# Patient Record
Sex: MALE | Race: WHITE | NOT HISPANIC OR LATINO | Employment: OTHER | URBAN - NONMETROPOLITAN AREA
[De-identification: names, ages, dates, MRNs, and addresses within clinical notes are randomized per-mention and may not be internally consistent; named-entity substitution may affect disease eponyms.]

---

## 2017-11-30 ENCOUNTER — PROCEDURE VISIT (OUTPATIENT)
Dept: OTOLARYNGOLOGY | Facility: CLINIC | Age: 77
End: 2017-11-30

## 2017-11-30 ENCOUNTER — OFFICE VISIT (OUTPATIENT)
Dept: OTOLARYNGOLOGY | Facility: CLINIC | Age: 77
End: 2017-11-30

## 2017-11-30 VITALS
BODY MASS INDEX: 38.3 KG/M2 | SYSTOLIC BLOOD PRESSURE: 142 MMHG | WEIGHT: 289 LBS | DIASTOLIC BLOOD PRESSURE: 80 MMHG | HEIGHT: 73 IN | RESPIRATION RATE: 20 BRPM | HEART RATE: 69 BPM | TEMPERATURE: 98.2 F

## 2017-11-30 DIAGNOSIS — H69.81 DYSFUNCTION OF RIGHT EUSTACHIAN TUBE: ICD-10-CM

## 2017-11-30 DIAGNOSIS — H69.81 DYSFUNCTION OF RIGHT EUSTACHIAN TUBE: Primary | ICD-10-CM

## 2017-11-30 DIAGNOSIS — H90.3 SENSORINEURAL HEARING LOSS (SNHL) OF BOTH EARS: ICD-10-CM

## 2017-11-30 DIAGNOSIS — J34.3 HYPERTROPHY OF BOTH INFERIOR NASAL TURBINATES: ICD-10-CM

## 2017-11-30 DIAGNOSIS — J30.9 ALLERGIC RHINITIS, UNSPECIFIED CHRONICITY, UNSPECIFIED SEASONALITY, UNSPECIFIED TRIGGER: ICD-10-CM

## 2017-11-30 DIAGNOSIS — H90.3 SENSORINEURAL HEARING LOSS (SNHL) OF BOTH EARS: Primary | ICD-10-CM

## 2017-11-30 PROCEDURE — 99203 OFFICE O/P NEW LOW 30 MIN: CPT | Performed by: NURSE PRACTITIONER

## 2017-11-30 RX ORDER — PENTOXIFYLLINE 400 MG/1
400 TABLET, EXTENDED RELEASE ORAL 2 TIMES DAILY
COMMUNITY
Start: 2017-10-30

## 2017-11-30 RX ORDER — DOXAZOSIN 8 MG/1
TABLET ORAL
Status: ON HOLD | COMMUNITY
Start: 2017-10-25 | End: 2021-01-01

## 2017-11-30 RX ORDER — LOSARTAN POTASSIUM 100 MG/1
TABLET ORAL
Status: ON HOLD | COMMUNITY
Start: 2017-10-30 | End: 2021-01-01

## 2017-11-30 RX ORDER — GLIMEPIRIDE 4 MG/1
TABLET ORAL
Status: ON HOLD | COMMUNITY
Start: 2017-10-25 | End: 2021-01-01

## 2017-11-30 RX ORDER — METFORMIN HYDROCHLORIDE 500 MG/1
500 TABLET, EXTENDED RELEASE ORAL 3 TIMES DAILY
Status: ON HOLD | COMMUNITY
Start: 2017-10-30 | End: 2021-01-01 | Stop reason: SDUPTHER

## 2017-11-30 RX ORDER — FUROSEMIDE 40 MG/1
TABLET ORAL
Status: ON HOLD | COMMUNITY
Start: 2017-10-30 | End: 2021-01-01

## 2017-11-30 RX ORDER — BISOPROLOL FUMARATE 10 MG/1
TABLET, FILM COATED ORAL
Status: ON HOLD | COMMUNITY
Start: 2017-10-30 | End: 2021-01-01

## 2017-11-30 RX ORDER — DOXEPIN HYDROCHLORIDE 10 MG/1
CAPSULE ORAL
Status: ON HOLD | COMMUNITY
Start: 2017-10-30 | End: 2021-01-01

## 2017-11-30 RX ORDER — FLUTICASONE PROPIONATE 50 MCG
2 SPRAY, SUSPENSION (ML) NASAL DAILY
Qty: 1 BOTTLE | Refills: 6 | Status: SHIPPED | OUTPATIENT
Start: 2017-11-30 | End: 2017-12-30

## 2017-11-30 RX ORDER — HYDRALAZINE HYDROCHLORIDE 25 MG/1
TABLET, FILM COATED ORAL
Status: ON HOLD | COMMUNITY
Start: 2017-10-30 | End: 2021-01-01

## 2017-11-30 RX ORDER — ALLOPURINOL 300 MG/1
TABLET ORAL
Status: ON HOLD | COMMUNITY
Start: 2017-10-25 | End: 2021-01-01

## 2017-11-30 RX ORDER — AMLODIPINE BESYLATE 10 MG/1
TABLET ORAL
Status: ON HOLD | COMMUNITY
Start: 2017-10-25 | End: 2021-01-01

## 2017-11-30 NOTE — PROGRESS NOTES
"PRIMARY CARE PROVIDER: Johnson Phan MD  REFERRING PROVIDER: Johnson Phan MD    Chief Complaint   Patient presents with   • Ear Problem     right ear pressure       Subjective   History of Present Illness:  Dayday Andrews is a  77 y.o. male who complains of ear pressure. The symptoms are localized to the right ear. The patient has had a resolution of the symptoms. The symptoms have been resolved for the last several weeks. The symptoms are aggravated by  \"a bad cold.\". There have been no factors that have improved the symptoms. He denies otalgia, otorrhea, dizziness, vertigo, tinnitus, or change in hearing. He is a nonsmoker    Review of Systems:  Review of Systems   Constitutional: Negative for chills and fever.   HENT: Positive for congestion. Negative for ear discharge, ear pain, hearing loss, postnasal drip, rhinorrhea, sore throat, tinnitus and voice change.    All other systems reviewed and are negative.      Past History:  Past Medical History:   Diagnosis Date   • Diabetes    • High blood pressure    • Sleep apnea      Past Surgical History:   Procedure Laterality Date   • KNEE SURGERY       Family History   Problem Relation Age of Onset   • Diabetes Mother      Social History   Substance Use Topics   • Smoking status: Never Smoker   • Smokeless tobacco: Never Used   • Alcohol use No     Allergies:  Review of patient's allergies indicates no known allergies.    Current Outpatient Prescriptions:   •  allopurinol (ZYLOPRIM) 300 MG tablet, , Disp: , Rfl:   •  amLODIPine (NORVASC) 10 MG tablet, , Disp: , Rfl:   •  bisoprolol (ZEBeta) 10 MG tablet, , Disp: , Rfl:   •  doxazosin (CARDURA) 8 MG tablet, , Disp: , Rfl:   •  doxepin (SINEquan) 10 MG capsule, , Disp: , Rfl:   •  furosemide (LASIX) 40 MG tablet, , Disp: , Rfl:   •  glimepiride (AMARYL) 4 MG tablet, , Disp: , Rfl:   •  hydrALAZINE (APRESOLINE) 25 MG tablet, , Disp: , Rfl:   •  losartan (COZAAR) 100 MG tablet, , Disp: , Rfl:   •  metFORMIN ER " (GLUCOPHAGE-XR) 500 MG 24 hr tablet, , Disp: , Rfl:   •  pentoxifylline (TRENtal) 400 MG CR tablet, , Disp: , Rfl:   •  fluticasone (FLONASE) 50 MCG/ACT nasal spray, 2 sprays into each nostril Daily for 30 days. Administer 2 sprays in each nostril for each dose., Disp: 1 bottle, Rfl: 6      Objective     Vital Signs:  Temp:  [98.2 °F (36.8 °C)] 98.2 °F (36.8 °C)  Heart Rate:  [69] 69  Resp:  [20] 20  BP: (142)/(80) 142/80    Physical Exam:  Physical Exam  CONSTITUTIONAL: well nourished, well-developed, alert, oriented, in no acute distress   COMMUNICATION AND VOICE: able to communicate normally, normal voice quality  HEAD: normocephalic, no lesions, atraumatic, no tenderness, no masses   FACE: appearance normal, no lesions, no tenderness, no deformities, facial motion symmetric  SALIVARY GLANDS: parotid glands with no tenderness, no swelling, no masses, submandibular glands with normal size, nontender  EYES: ocular motility normal, eyelids normal, orbits normal, no proptosis, conjunctiva normal , pupils equal, round   EARS:  Hearing: response to conversational voice normal bilaterally   External Ears: auricles without lesions  Otoscopic: tympanic membrane appearance normal, no lesions, no perforation, normal mobility, no fluid  NOSE:  External Nose: structure normal, no tenderness on palpation, no nasal discharge, no lesions, no evidence of trauma, nostrils patent   Intranasal Exam: nasal mucosa appears allergic with mucosal stranding, vestibule within normal limits, bilateral inferior turbinate hypertrophy, nasal septum without overt deviation  ORAL:  Lips: upper and lower lips without lesion   Teeth: dentition within normal limits for age   Gums: gingivae healthy   Oral Mucosa: oral mucosa normal, no mucosal lesions   Floor of Mouth: Warthin’s duct patent, mucosa normal  Tongue: lingual mucosa normal without lesions, normal tongue mobility   Palate: soft and hard palates with normal mucosa and  structure  Oropharynx: oropharyngeal mucosa normal  NECK: neck appearance normal, no masses or tenderness  THYROID: no overt thyromegaly, no tenderness, nodules or mass present on palpation, position midline   LYMPH NODES: no lymphadenopathy  CHEST/RESPIRATORY: respiratory effort normal, normal breath sounds   CARDIOVASCULAR: rate and rhythm normal, extremities without cyanosis or edema    NEUROLOGIC/PSYCHIATRIC: oriented to time, place and person, mood normal, affect appropriate, CN II-XII intact grossly    Results Review:       Assessment   Assessment:  1. Dysfunction of right eustachian tube    2. Allergic rhinitis, unspecified chronicity, unspecified seasonality, unspecified trigger    3. Hypertrophy of both inferior nasal turbinates    4. Sensorineural hearing loss (SNHL) of both ears        Plan   Plan:    New Medications Ordered This Visit   Medications   • fluticasone (FLONASE) 50 MCG/ACT nasal spray     Si sprays into each nostril Daily for 30 days. Administer 2 sprays in each nostril for each dose.     Dispense:  1 bottle     Refill:  6     Start nasal sprays. The proper use of nasal inhalers was discussed including the need for regular use and build up of drug levels before full effects.  Call for ear drainage, ear pain, fever over 101, or hearing loss. Call for problems or worsening symptoms.     Return in about 3 months (around 2018), or if symptoms worsen or fail to improve, for Recheck.    My findings and recommendations were discussed and questions were answered.     Cathy Montilla, APRN  17  1:02 PM

## 2017-11-30 NOTE — PATIENT INSTRUCTIONS
(1) See the medical provider as scheduled due to the middle ear disorder at the right ear.  (2) Receive audiological testing as needed.

## 2018-11-06 ENCOUNTER — OFFICE VISIT (OUTPATIENT)
Dept: GASTROENTEROLOGY | Facility: CLINIC | Age: 78
End: 2018-11-06

## 2018-11-06 VITALS
WEIGHT: 288 LBS | OXYGEN SATURATION: 98 % | HEART RATE: 79 BPM | SYSTOLIC BLOOD PRESSURE: 128 MMHG | BODY MASS INDEX: 38.17 KG/M2 | DIASTOLIC BLOOD PRESSURE: 70 MMHG | HEIGHT: 73 IN

## 2018-11-06 DIAGNOSIS — Z86.010 HX OF ADENOMATOUS COLONIC POLYPS: Primary | ICD-10-CM

## 2018-11-06 DIAGNOSIS — E11.9 CONTROLLED TYPE 2 DIABETES MELLITUS WITHOUT COMPLICATION, WITHOUT LONG-TERM CURRENT USE OF INSULIN (HCC): ICD-10-CM

## 2018-11-06 DIAGNOSIS — I10 HTN (HYPERTENSION), BENIGN: ICD-10-CM

## 2018-11-06 PROCEDURE — S0260 H&P FOR SURGERY: HCPCS | Performed by: CLINICAL NURSE SPECIALIST

## 2018-11-06 RX ORDER — ASPIRIN 81 MG/1
81 TABLET ORAL DAILY
COMMUNITY
End: 2021-01-01 | Stop reason: HOSPADM

## 2018-11-06 NOTE — PROGRESS NOTES
Dayday Andrews  1940 11/6/2018  Chief Complaint   Patient presents with   • Colonoscopy     Subjective   HPI  Dayday Andrews is a 78 y.o. male who presents as a referral for preventative maintenance. He has no complaints of nausea or vomiting. No change in bowels. No wt loss. No BRBPR. No melena. There is NO family hx for colon cancer. No abdominal pain.  Past Medical History:   Diagnosis Date   • Diabetes (CMS/HCC)    • High blood pressure    • Hx of colonic polyp    • Sleep apnea      Past Surgical History:   Procedure Laterality Date   • COLONOSCOPY W/ POLYPECTOMY  09/22/2015    3 Tubular adenomas Ileocecal valve and at 80 cm repeat exam in 3 years   • KNEE SURGERY       Outpatient Prescriptions Marked as Taking for the 11/6/18 encounter (Office Visit) with Tori Chin APRN   Medication Sig Dispense Refill   • allopurinol (ZYLOPRIM) 300 MG tablet      • amLODIPine (NORVASC) 10 MG tablet      • aspirin 81 MG EC tablet Take 81 mg by mouth Daily.     • bisoprolol (ZEBeta) 10 MG tablet      • doxazosin (CARDURA) 8 MG tablet      • doxepin (SINEquan) 10 MG capsule      • furosemide (LASIX) 40 MG tablet      • glimepiride (AMARYL) 4 MG tablet      • hydrALAZINE (APRESOLINE) 25 MG tablet      • losartan (COZAAR) 100 MG tablet      • metFORMIN ER (GLUCOPHAGE-XR) 500 MG 24 hr tablet      • pentoxifylline (TRENtal) 400 MG CR tablet        No Known Allergies  Social History     Social History   • Marital status:      Spouse name: N/A   • Number of children: N/A   • Years of education: N/A     Occupational History   • Not on file.     Social History Main Topics   • Smoking status: Never Smoker   • Smokeless tobacco: Never Used   • Alcohol use No   • Drug use: Unknown   • Sexual activity: Not on file     Other Topics Concern   • Not on file     Social History Narrative   • No narrative on file     Family History   Problem Relation Age of Onset   • Diabetes Mother    • Colon cancer Neg Hx    •  "Colon polyps Neg Hx      Health Maintenance   Topic Date Due   • URINE MICROALBUMIN  1940   • TDAP/TD VACCINES (1 - Tdap) 05/28/1961   • ZOSTER VACCINE (1 of 2) 05/28/1992   • PNEUMOCOCCAL VACCINES (65+ LOW/MEDIUM RISK) (1 of 2 - PCV13) 05/28/2007   • MEDICARE ANNUAL WELLNESS  07/25/2018   • INFLUENZA VACCINE  08/01/2018   • HEMOGLOBIN A1C  11/21/2018   • COLONOSCOPY  09/22/2025       REVIEW OF SYSTEMS  General: well appearing, no fever chills or sweats, no unexplained wt loss  HEENT: no acute visual or hearing disturbances  Cardiovascular: No chest pain or palpitations  Pulmonary: No shortness of breath, coughing, wheezing or hemoptysis  : No burning, urgency, hematuria, or dysuria  Musculoskeletal: No joint pain or stiffness  Peripheral: no edema  Skin: No lesions or rashes  Neuro: No dizziness, headaches, stroke, syncope  Endocrine: No hot or cold intolerances  Hematological: No blood dyscrasias    Objective   Vitals:    11/06/18 1258   BP: 128/70   Pulse: 79   SpO2: 98%   Weight: 131 kg (288 lb)   Height: 185.4 cm (73\")     Body mass index is 38 kg/m².  Patient's Body mass index is 38 kg/m². BMI is above normal parameters. Recommendations include: nutrition counseling.      PHYSICAL EXAM  General: age appropriate well nourished well appearing, no acute distress  Head: normocephalic and atraumatic  Global assessment-supple  Neck-No JVD noted, no lymphadenopathy  Pulmonary-clear to auscultation bilaterally, normal respiratory effort  Cardiovascular-normal rate and rhythm, normal heart sounds, S1 and S2 noted  Abdomen-soft, non tender, non distended, normal bowel sounds all 4 quadrants, no hepatosplenomegaly noted  Extremities-No clubbing cyanosis or edema  Neuro-Non focal, converses appropriately, awake, alert, oriented    Assessment/Plan     Dayday was seen today for colonoscopy.    Diagnoses and all orders for this visit:    Hx of adenomatous colonic polyps  -     Case Request; Standing  -     Follow " Anesthesia Guidelines / Standing Orders; Future  -     Implement Anesthesia Orders Day of Procedure; Standing  -     Obtain Informed Consent; Standing  -     Verify bowel prep was successful; Standing  -     Case Request    HTN (hypertension), benign  Comments:  cont BP medication the day of procedure    Controlled type 2 diabetes mellitus without complication, without long-term current use of insulin (CMS/[facility])  Comments:  Hold Metformin the am of procedure        COLONOSCOPY WITH ANESTHESIA (N/A)  Body mass index is 38 kg/m².    Patient instructions on prep prior to procedure provided to the patient.    All risks, benefits, alternatives, and indications of colonoscopy procedure have been discussed with the patient. Risks to include perforation of the colon requiring possible surgery or colostomy, risk of bleeding from biopsies or removal of colon tissue, possibility of missing a colon polyp or cancer, or adverse drug reaction.  Benefits to include the diagnosis and management of disease of the colon and rectum. Alternatives to include barium enema, radiographic evaluation, lab testing or no intervention. Pt verbalizes understanding and agrees.     Tori Chin, BRIANNA  2018  1:09 PM      IF YOU SMOKE OR USE TOBACCO PLEASE READ THE FOLLOWIN minutes reading provided    Why is smoking bad for me?  Smoking increases the risk of heart disease, lung disease, vascular disease, stroke, and cancer.     If you smoke, STOP!    If you would like more information on quitting smoking, please visit the [employer] website: www.Factabase/[employer] Ltdate/healthier-together/smoke   This link will provide additional resources including the QUIT line and the Beat the Pack support groups.     For more information:    Quit Now [place]  -QUIT-NOW  [email or web address].quitlogix.org/en-US/    Obesity, Adult  Obesity is the condition of having too much total body fat. Being overweight or obese means that your  weight is greater than what is considered healthy for your body size. Obesity is determined by a measurement called BMI. BMI is an estimate of body fat and is calculated from height and weight. For adults, a BMI of 30 or higher is considered obese.  Obesity can eventually lead to other health concerns and major illnesses, including:  · Stroke.  · Coronary artery disease (CAD).  · Type 2 diabetes.  · Some types of cancer, including cancers of the colon, breast, uterus, and gallbladder.  · Osteoarthritis.  · High blood pressure (hypertension).  · High cholesterol.  · Sleep apnea.  · Gallbladder stones.  · Infertility problems.  What are the causes?  The main cause of obesity is taking in (consuming) more calories than your body uses for energy. Other factors that contribute to this condition may include:  · Being born with genes that make you more likely to become obese.  · Having a medical condition that causes obesity. These conditions include:  ¨ Hypothyroidism.  ¨ Polycystic ovarian syndrome (PCOS).  ¨ Binge-eating disorder.  ¨ Cushing syndrome.  · Taking certain medicines, such as steroids, antidepressants, and seizure medicines.  · Not being physically active (sedentary lifestyle).  · Living where there are limited places to exercise safely or buy healthy foods.  · Not getting enough sleep.  What increases the risk?  The following factors may increase your risk of this condition:  · Having a family history of obesity.  · Being a woman of -American descent.  · Being a man of  descent.  What are the signs or symptoms?  Having excessive body fat is the main symptom of this condition.  How is this diagnosed?  This condition may be diagnosed based on:  · Your symptoms.  · Your medical history.  · A physical exam. Your health care provider may measure:  ¨ Your BMI. If you are an adult with a BMI between 25 and less than 30, you are considered overweight. If you are an adult with a BMI of 30 or higher,  you are considered obese.  ¨ The distances around your hips and your waist (circumferences). These may be compared to each other to help diagnose your condition.  ¨ Your skinfold thickness. Your health care provider may gently pinch a fold of your skin and measure it.  How is this treated?  Treatment for this condition often includes changing your lifestyle. Treatment may include some or all of the following:  · Dietary changes. Work with your health care provider and a dietitian to set a weight-loss goal that is healthy and reasonable for you. Dietary changes may include eating:  ¨ Smaller portions. A portion size is the amount of a particular food that is healthy for you to eat at one time. This varies from person to person.  ¨ Low-calorie or low-fat options.  ¨ More whole grains, fruits, and vegetables.  · Regular physical activity. This may include aerobic activity (cardio) and strength training.  · Medicine to help you lose weight. Your health care provider may prescribe medicine if you are unable to lose 1 pound a week after 6 weeks of eating more healthily and doing more physical activity.  · Surgery. Surgical options may include gastric banding and gastric bypass. Surgery may be done if:  ¨ Other treatments have not helped to improve your condition.  ¨ You have a BMI of 40 or higher.  ¨ You have life-threatening health problems related to obesity.  Follow these instructions at home:     Eating and drinking     · Follow recommendations from your health care provider about what you eat and drink. Your health care provider may advise you to:  ¨ Limit fast foods, sweets, and processed snack foods.  ¨ Choose low-fat options, such as low-fat milk instead of whole milk.  ¨ Eat 5 or more servings of fruits or vegetables every day.  ¨ Eat at home more often. This gives you more control over what you eat.  ¨ Choose healthy foods when you eat out.  ¨ Learn what a healthy portion size is.  ¨ Keep low-fat snacks on  hand.  ¨ Avoid sugary drinks, such as soda, fruit juice, iced tea sweetened with sugar, and flavored milk.  ¨ Eat a healthy breakfast.  · Drink enough water to keep your urine clear or pale yellow.  · Do not go without eating for long periods of time (do not fast) or follow a fad diet. Fasting and fad diets can be unhealthy and even dangerous.  Physical Activity   · Exercise regularly, as told by your health care provider. Ask your health care provider what types of exercise are safe for you and how often you should exercise.  · Warm up and stretch before being active.  · Cool down and stretch after being active.  · Rest between periods of activity.  Lifestyle   · Limit the time that you spend in front of your TV, computer, or video game system.  · Find ways to reward yourself that do not involve food.  · Limit alcohol intake to no more than 1 drink a day for nonpregnant women and 2 drinks a day for men. One drink equals 12 oz of beer, 5 oz of wine, or 1½ oz of hard liquor.  General instructions   · Keep a weight loss journal to keep track of the food you eat and how much you exercise you get.  · Take over-the-counter and prescription medicines only as told by your health care provider.  · Take vitamins and supplements only as told by your health care provider.  · Consider joining a support group. Your health care provider may be able to recommend a support group.  · Keep all follow-up visits as told by your health care provider. This is important.  Contact a health care provider if:  · You are unable to meet your weight loss goal after 6 weeks of dietary and lifestyle changes.  This information is not intended to replace advice given to you by your health care provider. Make sure you discuss any questions you have with your health care provider.  Document Released: 01/25/2006 Document Revised: 05/22/2017 Document Reviewed: 10/05/2016  Elsevier Interactive Patient Education © 2017 [employer]

## 2018-12-13 ENCOUNTER — HOSPITAL ENCOUNTER (OUTPATIENT)
Facility: HOSPITAL | Age: 78
Setting detail: HOSPITAL OUTPATIENT SURGERY
Discharge: HOME | End: 2018-12-13
Attending: INTERNAL MEDICINE | Admitting: INTERNAL MEDICINE

## 2018-12-13 ENCOUNTER — ANESTHESIA (OUTPATIENT)
Dept: GASTROENTEROLOGY | Facility: HOSPITAL | Age: 78
End: 2018-12-13

## 2018-12-13 ENCOUNTER — ANESTHESIA EVENT (OUTPATIENT)
Dept: GASTROENTEROLOGY | Facility: HOSPITAL | Age: 78
End: 2018-12-13

## 2018-12-13 VITALS
HEIGHT: 72 IN | OXYGEN SATURATION: 97 % | SYSTOLIC BLOOD PRESSURE: 122 MMHG | HEART RATE: 64 BPM | DIASTOLIC BLOOD PRESSURE: 59 MMHG | RESPIRATION RATE: 16 BRPM | BODY MASS INDEX: 37.93 KG/M2 | WEIGHT: 280 LBS

## 2018-12-13 DIAGNOSIS — Z86.010 HX OF ADENOMATOUS COLONIC POLYPS: ICD-10-CM

## 2018-12-13 LAB — GLUCOSE BLDC GLUCOMTR-MCNC: 282 MG/DL (ref 70–130)

## 2018-12-13 PROCEDURE — 25010000002 PROPOFOL 10 MG/ML EMULSION: Performed by: NURSE ANESTHETIST, CERTIFIED REGISTERED

## 2018-12-13 PROCEDURE — 82962 GLUCOSE BLOOD TEST: CPT

## 2018-12-13 PROCEDURE — 45385 COLONOSCOPY W/LESION REMOVAL: CPT | Performed by: INTERNAL MEDICINE

## 2018-12-13 PROCEDURE — 88305 TISSUE EXAM BY PATHOLOGIST: CPT | Performed by: INTERNAL MEDICINE

## 2018-12-13 RX ORDER — LIDOCAINE HYDROCHLORIDE 20 MG/ML
INJECTION, SOLUTION INFILTRATION; PERINEURAL AS NEEDED
Status: DISCONTINUED | OUTPATIENT
Start: 2018-12-13 | End: 2018-12-13 | Stop reason: SURG

## 2018-12-13 RX ORDER — SODIUM CHLORIDE 9 MG/ML
500 INJECTION, SOLUTION INTRAVENOUS CONTINUOUS PRN
Status: DISCONTINUED | OUTPATIENT
Start: 2018-12-13 | End: 2018-12-13 | Stop reason: HOSPADM

## 2018-12-13 RX ORDER — PROPOFOL 10 MG/ML
VIAL (ML) INTRAVENOUS AS NEEDED
Status: DISCONTINUED | OUTPATIENT
Start: 2018-12-13 | End: 2018-12-13 | Stop reason: SURG

## 2018-12-13 RX ORDER — SODIUM CHLORIDE 0.9 % (FLUSH) 0.9 %
3 SYRINGE (ML) INJECTION AS NEEDED
Status: DISCONTINUED | OUTPATIENT
Start: 2018-12-13 | End: 2018-12-13 | Stop reason: HOSPADM

## 2018-12-13 RX ADMIN — PROPOFOL 25 MG: 10 INJECTION, EMULSION INTRAVENOUS at 13:34

## 2018-12-13 RX ADMIN — LIDOCAINE HYDROCHLORIDE 0.5 ML: 10 INJECTION, SOLUTION EPIDURAL; INFILTRATION; INTRACAUDAL; PERINEURAL at 11:43

## 2018-12-13 RX ADMIN — PROPOFOL 25 MG: 10 INJECTION, EMULSION INTRAVENOUS at 13:26

## 2018-12-13 RX ADMIN — PROPOFOL 50 MG: 10 INJECTION, EMULSION INTRAVENOUS at 13:24

## 2018-12-13 RX ADMIN — PROPOFOL 25 MG: 10 INJECTION, EMULSION INTRAVENOUS at 13:28

## 2018-12-13 RX ADMIN — PROPOFOL 50 MG: 10 INJECTION, EMULSION INTRAVENOUS at 13:23

## 2018-12-13 RX ADMIN — PROPOFOL 25 MG: 10 INJECTION, EMULSION INTRAVENOUS at 13:36

## 2018-12-13 RX ADMIN — PROPOFOL 25 MG: 10 INJECTION, EMULSION INTRAVENOUS at 13:25

## 2018-12-13 RX ADMIN — SODIUM CHLORIDE 500 ML: 9 INJECTION, SOLUTION INTRAVENOUS at 11:42

## 2018-12-13 RX ADMIN — LIDOCAINE HYDROCHLORIDE 40 MG: 20 INJECTION, SOLUTION INFILTRATION; PERINEURAL at 13:23

## 2018-12-13 RX ADMIN — PROPOFOL 25 MG: 10 INJECTION, EMULSION INTRAVENOUS at 13:31

## 2018-12-13 NOTE — ANESTHESIA PREPROCEDURE EVALUATION
Anesthesia Evaluation     Patient summary reviewed   no history of anesthetic complications:  NPO Solid Status: > 8 hours  NPO Liquid Status: > 2 hours           Airway   Mallampati: II  TM distance: >3 FB  No difficulty expected  Dental          Pulmonary    (+) sleep apnea on CPAP,   (-) asthma, not a smoker  Cardiovascular     Patient on routine beta blocker and Beta blocker given within 24 hours of surgery    (+) hypertension,   (-) past MI, CAD, cardiac stents      Neuro/Psych  (+) TIA,     (-) seizures, CVA  GI/Hepatic/Renal/Endo    (+) obesity,   diabetes mellitus,   (-) liver disease, no renal disease    Musculoskeletal     Abdominal    Substance History      OB/GYN          Other                        Anesthesia Plan    ASA 3     general   total IV anesthesia  intravenous induction   Anesthetic plan, all risks, benefits, and alternatives have been provided, discussed and informed consent has been obtained with: patient.

## 2018-12-13 NOTE — ANESTHESIA POSTPROCEDURE EVALUATION
Patient: Dayday Andrews    Procedure Summary     Date:  12/13/18 Room / Location:  [facility] ENDOSCOPY 4 /  [facility]    Anesthesia Start:  1317 Anesthesia Stop:  1343    Procedure:  COLONOSCOPY WITH ANESTHESIA (N/A ) Diagnosis:       Hx of adenomatous colonic polyps      (Hx of adenomatous colonic polyps [Z86.010])    Surgeon:  Lauri Bourne MD Provider:  Jhoan Mosqueda CRNA    Anesthesia Type:  general ASA Status:  3          Anesthesia Type: general  Last vitals  BP   122/59 (12/13/18 1410)   Temp       Pulse   64 (12/13/18 1410)   Resp   16 (12/13/18 1410)     SpO2   97 % (12/13/18 1410)     Post Anesthesia Care and Evaluation    Patient location during evaluation: PHASE II  Patient participation: complete - patient participated  Level of consciousness: awake and alert  Pain management: adequate  Airway patency: patent  Anesthetic complications: No anesthetic complications    Cardiovascular status: acceptable  Respiratory status: acceptable  Hydration status: acceptable

## 2018-12-13 NOTE — ANESTHESIA POSTPROCEDURE EVALUATION
Patient: Dayday Andrews    Procedure Summary     Date:  12/13/18 Room / Location:  [facility] ENDOSCOPY 4 /  [facility]    Anesthesia Start:  1317 Anesthesia Stop:  1343    Procedure:  COLONOSCOPY WITH ANESTHESIA (N/A ) Diagnosis:       Hx of adenomatous colonic polyps      (Hx of adenomatous colonic polyps [Z86.010])    Surgeon:  Lauri Bourne MD Provider:  Jhoan Mosqueda CRNA    Anesthesia Type:  general ASA Status:  3          Anesthesia Type: general  Last vitals  BP   122/59 (12/13/18 1410)   Temp       Pulse   64 (12/13/18 1410)   Resp   16 (12/13/18 1410)     SpO2   97 % (12/13/18 1410)     Post Anesthesia Care and Evaluation    Patient location during evaluation: PHASE II  Patient participation: complete - patient participated  Level of consciousness: awake and alert  Pain management: adequate  Airway patency: patent  Anesthetic complications: No anesthetic complications    Cardiovascular status: acceptable  Respiratory status: acceptable  Hydration status: acceptable

## 2018-12-13 NOTE — H&P
[facility] Gastroenterology  Pre Procedure History & Physical    Chief Complaint:   History of polyps    Subjective     HPI:   Here for colonoscopy.  History of polyps    Past Medical History:   Past Medical History:   Diagnosis Date   • Arthritis    • Diabetes (CMS/HCC)    • High blood pressure    • Hx of colonic polyp    • Sleep apnea        Past Surgical History:  Past Surgical History:   Procedure Laterality Date   • COLONOSCOPY W/ POLYPECTOMY  09/22/2015    3 Tubular adenomas Ileocecal valve and at 80 cm repeat exam in 3 years   • KNEE SURGERY         Family History:  Family History   Problem Relation Age of Onset   • Diabetes Mother    • Colon cancer Neg Hx    • Colon polyps Neg Hx        Social History:   reports that  has never smoked. he has never used smokeless tobacco. He reports that he does not drink alcohol.    Medications:   Prior to Admission medications    Medication Sig Start Date End Date Taking? Authorizing Provider   allopurinol (ZYLOPRIM) 300 MG tablet  10/25/17  Yes Provider, Historical, MD   amLODIPine (NORVASC) 10 MG tablet  10/25/17  Yes Provider, Historical, MD   bisoprolol (ZEBeta) 10 MG tablet  10/30/17  Yes Provider, Historical, MD   doxazosin (CARDURA) 8 MG tablet  10/25/17  Yes Provider, Historical, MD   doxepin (SINEquan) 10 MG capsule  10/30/17  Yes Provider, Historical, MD   furosemide (LASIX) 40 MG tablet  10/30/17  Yes Provider, Historical, MD   glimepiride (AMARYL) 4 MG tablet  10/25/17  Yes Provider, Historical, MD   hydrALAZINE (APRESOLINE) 25 MG tablet  10/30/17  Yes Provider, Historical, MD   losartan (COZAAR) 100 MG tablet  10/30/17  Yes Provider, Historical, MD   metFORMIN ER (GLUCOPHAGE-XR) 500 MG 24 hr tablet  10/30/17  Yes Provider, Historical, MD   pentoxifylline (TRENtal) 400 MG CR tablet  10/30/17  Yes Provider, Historical, MD   polyethylene glycol (GoLYTELY) 236 g solution Take as directed by office instructions. 11/6/18  Yes Tori Chin APRN   aspirin  "81 MG EC tablet Take 81 mg by mouth Daily.    Provider, MD Sheryl       Allergies:  Patient has no known allergies.    Objective     Blood pressure 152/71, pulse 73, resp. rate 21, height 182.9 cm (72\"), weight 127 kg (280 lb), SpO2 95 %.    Physical Exam   Constitutional: Pt is oriented to person, place, and in no distress.   HENT: Mouth/Throat: Oropharynx is clear.   Cardiovascular: Normal rate, regular rhythm.    Pulmonary/Chest: Effort normal. No respiratory distress. No  wheezes.   Abdominal: Soft. Non-distended.  Skin: Skin is warm and dry.   Psychiatric: Mood, memory, affect and judgment appear normal.     Assessment/Plan     Diagnosis:  History of polyps    Anticipated Surgical Procedure:    Proceed with colonoscopy as scheduled    The following major R/B/A were discussed with the patient, however the list is not all inclusive . Risk:  Bleeding (immediate and delayed), perforation (rupture or tear), reaction to medication, missed lesion/cancer, pain during the procedure, infection, need for surgery, need for ostomy, need for mechanical ventilation (breathing machine), death.  Benefits: removal of polyp/tissue, burn/clip/or inject to stop bleeding, removal of foreign body, dilate any stricture.  Alternatives: Xray or CT, surgery, do nothing with associated risk   The patient was given time to ask question and received explanation, and agrees to proceed as per History and Physical.   No guarantee given or expressed.    EMR Dragon/transcription disclaimer: Much of this encounter note is an electronic transcription/translation of spoken language to printed text.  The electronic translation of spoken language may permit erroneous, or at times, nonsensical words or phrases to be inadvertently transcribed.  Although I have reviewed the note for such errors, some may still exist.    Lauri Bourne MD  1:23 PM  12/13/2018    "

## 2018-12-13 NOTE — ANESTHESIA POSTPROCEDURE EVALUATION
Patient: Dayday Andrews    Procedure Summary     Date:  12/13/18 Room / Location:   [facility] 4 /  [facility]    Anesthesia Start:  1317 Anesthesia Stop:  1343    Procedure:  COLONOSCOPY WITH ANESTHESIA (N/A ) Diagnosis:       Hx of adenomatous colonic polyps      (Hx of adenomatous colonic polyps [Z86.010])    Surgeon:  Lauri Bourne MD Provider:  Jhoan Mosqueda CRNA    Anesthesia Type:  general ASA Status:  3          Anesthesia Type: general  Last vitals  BP   122/59 (12/13/18 1410)   Temp       Pulse   64 (12/13/18 1410)   Resp   16 (12/13/18 1410)     SpO2   97 % (12/13/18 1410)     Anesthesia Post Evaluation

## 2018-12-13 NOTE — ANESTHESIA POSTPROCEDURE EVALUATION
Patient: Dayday Andrews    Procedure Summary     Date:  12/13/18 Room / Location:  [facility] ENDOSCOPY 4 / [facility]    Anesthesia Start:  1317 Anesthesia Stop:  1343    Procedure:  COLONOSCOPY WITH ANESTHESIA (N/A ) Diagnosis:       Hx of adenomatous colonic polyps      (Hx of adenomatous colonic polyps [Z86.010])    Surgeon:  Lauri Bourne MD Provider:  Jhoan Mosqueda CRNA    Anesthesia Type:  general ASA Status:  3          Anesthesia Type: general  Last vitals  BP   152/71 (12/13/18 1129)   Temp       Pulse   73 (12/13/18 1129)   Resp   21 (12/13/18 1129)     SpO2   95 % (12/13/18 1129)     Post Anesthesia Care and Evaluation    Patient location during evaluation: PACU  Patient participation: complete - patient participated  Level of consciousness: awake and alert  Pain management: adequate  Airway patency: patent  Anesthetic complications: No anesthetic complications    Cardiovascular status: acceptable  Respiratory status: acceptable  Hydration status: acceptable

## 2018-12-22 LAB
CYTO UR: NORMAL
LAB AP CASE REPORT: NORMAL
LAB AP CLINICAL INFORMATION: NORMAL
PATH REPORT.FINAL DX SPEC: NORMAL
PATH REPORT.GROSS SPEC: NORMAL

## 2021-01-01 ENCOUNTER — READMISSION MANAGEMENT (OUTPATIENT)
Dept: CALL CENTER | Facility: HOSPITAL | Age: 81
End: 2021-01-01

## 2021-01-01 ENCOUNTER — TELEPHONE (OUTPATIENT)
Dept: CARDIOLOGY | Facility: CLINIC | Age: 81
End: 2021-01-01

## 2021-01-01 ENCOUNTER — LAB REQUISITION (OUTPATIENT)
Dept: LAB | Facility: HOSPITAL | Age: 81
End: 2021-01-01

## 2021-01-01 ENCOUNTER — NURSE TRIAGE (OUTPATIENT)
Dept: CALL CENTER | Facility: HOSPITAL | Age: 81
End: 2021-01-01

## 2021-01-01 ENCOUNTER — HOSPITAL ENCOUNTER (EMERGENCY)
Facility: HOSPITAL | Age: 81
Discharge: HOME | End: 2021-04-10
Attending: INTERNAL MEDICINE | Admitting: INTERNAL MEDICINE

## 2021-01-01 ENCOUNTER — APPOINTMENT (OUTPATIENT)
Dept: CT IMAGING | Facility: HOSPITAL | Age: 81
End: 2021-01-01

## 2021-01-01 ENCOUNTER — APPOINTMENT (OUTPATIENT)
Dept: GENERAL RADIOLOGY | Facility: HOSPITAL | Age: 81
End: 2021-01-01

## 2021-01-01 ENCOUNTER — APPOINTMENT (OUTPATIENT)
Dept: CARDIOLOGY | Facility: HOSPITAL | Age: 81
End: 2021-01-01

## 2021-01-01 ENCOUNTER — APPOINTMENT (OUTPATIENT)
Dept: ULTRASOUND IMAGING | Facility: HOSPITAL | Age: 81
End: 2021-01-01

## 2021-01-01 ENCOUNTER — HOSPITAL ENCOUNTER (EMERGENCY)
Facility: HOSPITAL | Age: 81
Discharge: HOME | End: 2021-03-30
Attending: EMERGENCY MEDICINE | Admitting: EMERGENCY MEDICINE

## 2021-01-01 ENCOUNTER — OFFICE VISIT (OUTPATIENT)
Dept: UROLOGY | Facility: CLINIC | Age: 81
End: 2021-01-01

## 2021-01-01 ENCOUNTER — APPOINTMENT (OUTPATIENT)
Dept: MRI IMAGING | Facility: HOSPITAL | Age: 81
End: 2021-01-01

## 2021-01-01 ENCOUNTER — HOSPITAL ENCOUNTER (INPATIENT)
Facility: HOSPITAL | Age: 81
LOS: 6 days | Discharge: HOSPICE | End: 2021-04-27
Attending: EMERGENCY MEDICINE | Admitting: FAMILY MEDICINE

## 2021-01-01 ENCOUNTER — APPOINTMENT (OUTPATIENT)
Dept: WOUND CARE | Facility: HOSPITAL | Age: 81
End: 2021-01-01

## 2021-01-01 ENCOUNTER — HOSPITAL ENCOUNTER (INPATIENT)
Facility: HOSPITAL | Age: 81
LOS: 4 days | Discharge: HOME | End: 2021-01-19
Attending: FAMILY MEDICINE | Admitting: INTERNAL MEDICINE

## 2021-01-01 ENCOUNTER — TELEPHONE (OUTPATIENT)
Dept: GASTROENTEROLOGY | Facility: CLINIC | Age: 81
End: 2021-01-01

## 2021-01-01 ENCOUNTER — HOSPITAL ENCOUNTER (INPATIENT)
Facility: HOSPITAL | Age: 81
LOS: 4 days | Discharge: NURSING, REHAB OR LONG-TERM CARE | End: 2021-04-15
Attending: INTERNAL MEDICINE | Admitting: INTERNAL MEDICINE

## 2021-01-01 ENCOUNTER — OFFICE VISIT (OUTPATIENT)
Dept: CARDIOLOGY | Facility: CLINIC | Age: 81
End: 2021-01-01

## 2021-01-01 ENCOUNTER — TELEPHONE (OUTPATIENT)
Dept: NEUROSURGERY | Facility: CLINIC | Age: 81
End: 2021-01-01

## 2021-01-01 VITALS
WEIGHT: 239.8 LBS | RESPIRATION RATE: 16 BRPM | HEART RATE: 103 BPM | TEMPERATURE: 98.2 F | BODY MASS INDEX: 31.78 KG/M2 | SYSTOLIC BLOOD PRESSURE: 158 MMHG | HEIGHT: 73 IN | DIASTOLIC BLOOD PRESSURE: 94 MMHG | OXYGEN SATURATION: 95 %

## 2021-01-01 VITALS
HEIGHT: 73 IN | HEART RATE: 88 BPM | SYSTOLIC BLOOD PRESSURE: 122 MMHG | DIASTOLIC BLOOD PRESSURE: 70 MMHG | RESPIRATION RATE: 16 BRPM | TEMPERATURE: 97.1 F | OXYGEN SATURATION: 99 % | BODY MASS INDEX: 29.93 KG/M2 | WEIGHT: 225.8 LBS

## 2021-01-01 VITALS
DIASTOLIC BLOOD PRESSURE: 56 MMHG | HEIGHT: 72 IN | BODY MASS INDEX: 28.85 KG/M2 | HEART RATE: 101 BPM | WEIGHT: 213 LBS | RESPIRATION RATE: 20 BRPM | TEMPERATURE: 97.3 F | OXYGEN SATURATION: 93 % | SYSTOLIC BLOOD PRESSURE: 113 MMHG

## 2021-01-01 VITALS
WEIGHT: 262.31 LBS | HEIGHT: 73 IN | HEART RATE: 75 BPM | DIASTOLIC BLOOD PRESSURE: 59 MMHG | RESPIRATION RATE: 18 BRPM | SYSTOLIC BLOOD PRESSURE: 129 MMHG | OXYGEN SATURATION: 96 % | TEMPERATURE: 98.1 F | BODY MASS INDEX: 34.76 KG/M2

## 2021-01-01 VITALS
OXYGEN SATURATION: 99 % | RESPIRATION RATE: 16 BRPM | TEMPERATURE: 97.8 F | HEIGHT: 73 IN | WEIGHT: 222 LBS | HEART RATE: 85 BPM | BODY MASS INDEX: 29.42 KG/M2 | DIASTOLIC BLOOD PRESSURE: 80 MMHG | SYSTOLIC BLOOD PRESSURE: 150 MMHG

## 2021-01-01 VITALS — WEIGHT: 222 LBS | BODY MASS INDEX: 29.42 KG/M2 | HEIGHT: 73 IN

## 2021-01-01 VITALS
WEIGHT: 238 LBS | HEIGHT: 73 IN | BODY MASS INDEX: 31.54 KG/M2 | HEART RATE: 79 BPM | SYSTOLIC BLOOD PRESSURE: 138 MMHG | OXYGEN SATURATION: 99 % | DIASTOLIC BLOOD PRESSURE: 70 MMHG

## 2021-01-01 DIAGNOSIS — L97.511 ULCER OF RIGHT FOOT, LIMITED TO BREAKDOWN OF SKIN (HCC): ICD-10-CM

## 2021-01-01 DIAGNOSIS — I26.94 MULTIPLE SUBSEGMENTAL PULMONARY EMBOLI WITHOUT ACUTE COR PULMONALE (HCC): ICD-10-CM

## 2021-01-01 DIAGNOSIS — R29.6 FALLS FREQUENTLY: ICD-10-CM

## 2021-01-01 DIAGNOSIS — I10 HTN (HYPERTENSION), BENIGN: ICD-10-CM

## 2021-01-01 DIAGNOSIS — R53.1 GENERALIZED WEAKNESS: Primary | ICD-10-CM

## 2021-01-01 DIAGNOSIS — S39.012A LUMBOSACRAL STRAIN, INITIAL ENCOUNTER: ICD-10-CM

## 2021-01-01 DIAGNOSIS — S32.020A CLOSED COMPRESSION FRACTURE OF L2 LUMBAR VERTEBRA, INITIAL ENCOUNTER (HCC): Primary | ICD-10-CM

## 2021-01-01 DIAGNOSIS — E86.0 DEHYDRATION: ICD-10-CM

## 2021-01-01 DIAGNOSIS — R13.10 DYSPHAGIA, UNSPECIFIED TYPE: ICD-10-CM

## 2021-01-01 DIAGNOSIS — Z78.9 DECREASED ACTIVITIES OF DAILY LIVING (ADL): ICD-10-CM

## 2021-01-01 DIAGNOSIS — Z51.5 COMFORT MEASURES ONLY STATUS: ICD-10-CM

## 2021-01-01 DIAGNOSIS — I26.09 OTHER ACUTE PULMONARY EMBOLISM WITH ACUTE COR PULMONALE (HCC): Primary | ICD-10-CM

## 2021-01-01 DIAGNOSIS — I48.0 PAROXYSMAL ATRIAL FIBRILLATION (HCC): Primary | ICD-10-CM

## 2021-01-01 DIAGNOSIS — Z86.711 HISTORY OF PULMONARY EMBOLISM: ICD-10-CM

## 2021-01-01 DIAGNOSIS — N39.0 URINARY TRACT INFECTION ASSOCIATED WITH INDWELLING URETHRAL CATHETER, SUBSEQUENT ENCOUNTER: Primary | ICD-10-CM

## 2021-01-01 DIAGNOSIS — R26.9 GAIT ABNORMALITY: ICD-10-CM

## 2021-01-01 DIAGNOSIS — Z00.00 ENCOUNTER FOR GENERAL ADULT MEDICAL EXAMINATION WITHOUT ABNORMAL FINDINGS: ICD-10-CM

## 2021-01-01 DIAGNOSIS — S32.020G COMPRESSION FRACTURE OF L2 VERTEBRA WITH DELAYED HEALING, SUBSEQUENT ENCOUNTER: Primary | ICD-10-CM

## 2021-01-01 DIAGNOSIS — R33.9 RETENTION OF URINE: Primary | ICD-10-CM

## 2021-01-01 DIAGNOSIS — I26.99 PULMONARY EMBOLISM, UNSPECIFIED CHRONICITY, UNSPECIFIED PULMONARY EMBOLISM TYPE, UNSPECIFIED WHETHER ACUTE COR PULMONALE PRESENT (HCC): ICD-10-CM

## 2021-01-01 DIAGNOSIS — B35.6 TINEA CRURIS: ICD-10-CM

## 2021-01-01 DIAGNOSIS — A41.9 SEPSIS, DUE TO UNSPECIFIED ORGANISM, UNSPECIFIED WHETHER ACUTE ORGAN DYSFUNCTION PRESENT (HCC): ICD-10-CM

## 2021-01-01 DIAGNOSIS — K59.00 CONSTIPATION, UNSPECIFIED CONSTIPATION TYPE: ICD-10-CM

## 2021-01-01 DIAGNOSIS — N30.00 ACUTE CYSTITIS WITHOUT HEMATURIA: Primary | ICD-10-CM

## 2021-01-01 DIAGNOSIS — T83.511D URINARY TRACT INFECTION ASSOCIATED WITH INDWELLING URETHRAL CATHETER, SUBSEQUENT ENCOUNTER: Primary | ICD-10-CM

## 2021-01-01 DIAGNOSIS — R33.8 ACUTE URINARY RETENTION: ICD-10-CM

## 2021-01-01 LAB
25(OH)D3 SERPL-MCNC: 21.5 NG/ML (ref 30–100)
ALBUMIN SERPL-MCNC: 3 G/DL (ref 3.5–5.2)
ALBUMIN SERPL-MCNC: 3.2 G/DL (ref 3.5–5.2)
ALBUMIN SERPL-MCNC: 3.3 G/DL (ref 3.5–5.2)
ALBUMIN SERPL-MCNC: 3.5 G/DL (ref 3.5–5.2)
ALBUMIN SERPL-MCNC: 3.6 G/DL (ref 3.5–5.2)
ALBUMIN SERPL-MCNC: 3.7 G/DL (ref 3.5–5.2)
ALBUMIN SERPL-MCNC: 3.7 G/DL (ref 3.5–5.2)
ALBUMIN SERPL-MCNC: 3.9 G/DL (ref 3.5–5.2)
ALBUMIN/GLOB SERPL: 1.1 G/DL
ALBUMIN/GLOB SERPL: 1.1 G/DL
ALBUMIN/GLOB SERPL: 1.2 G/DL
ALBUMIN/GLOB SERPL: 1.2 G/DL
ALBUMIN/GLOB SERPL: 1.4 G/DL
ALBUMIN/GLOB SERPL: 1.5 G/DL
ALBUMIN/GLOB SERPL: 1.6 G/DL
ALP SERPL-CCNC: 115 U/L (ref 39–117)
ALP SERPL-CCNC: 115 U/L (ref 39–117)
ALP SERPL-CCNC: 118 U/L (ref 39–117)
ALP SERPL-CCNC: 118 U/L (ref 39–117)
ALP SERPL-CCNC: 125 U/L (ref 39–117)
ALP SERPL-CCNC: 137 U/L (ref 39–117)
ALP SERPL-CCNC: 144 U/L (ref 39–117)
ALP SERPL-CCNC: 99 U/L (ref 39–117)
ALT SERPL W P-5'-P-CCNC: 10 U/L (ref 1–41)
ALT SERPL W P-5'-P-CCNC: 13 U/L (ref 1–41)
ALT SERPL W P-5'-P-CCNC: 14 U/L (ref 1–41)
ALT SERPL W P-5'-P-CCNC: 15 U/L (ref 1–41)
ALT SERPL W P-5'-P-CCNC: 15 U/L (ref 1–41)
ANION GAP SERPL CALCULATED.3IONS-SCNC: 10 MMOL/L (ref 5–15)
ANION GAP SERPL CALCULATED.3IONS-SCNC: 11 MMOL/L (ref 5–15)
ANION GAP SERPL CALCULATED.3IONS-SCNC: 12 MMOL/L (ref 5–15)
ANION GAP SERPL CALCULATED.3IONS-SCNC: 13 MMOL/L (ref 5–15)
ANION GAP SERPL CALCULATED.3IONS-SCNC: 14 MMOL/L (ref 5–15)
ANION GAP SERPL CALCULATED.3IONS-SCNC: 14 MMOL/L (ref 5–15)
ANION GAP SERPL CALCULATED.3IONS-SCNC: 16 MMOL/L (ref 5–15)
ANION GAP SERPL CALCULATED.3IONS-SCNC: 17 MMOL/L (ref 5–15)
ANION GAP SERPL CALCULATED.3IONS-SCNC: 20 MMOL/L (ref 5–15)
ANION GAP SERPL CALCULATED.3IONS-SCNC: 6 MMOL/L (ref 5–15)
ANION GAP SERPL CALCULATED.3IONS-SCNC: 7 MMOL/L (ref 5–15)
ANION GAP SERPL CALCULATED.3IONS-SCNC: 8 MMOL/L (ref 5–15)
ANION GAP SERPL CALCULATED.3IONS-SCNC: 8 MMOL/L (ref 5–15)
ANION GAP SERPL CALCULATED.3IONS-SCNC: 9 MMOL/L (ref 5–15)
APTT PPP: 27.5 SECONDS (ref 24.1–35)
APTT PPP: 31.5 SECONDS (ref 24.1–35)
APTT PPP: 35.1 SECONDS (ref 24.1–35)
APTT PPP: 42.8 SECONDS (ref 24.1–35)
AST SERPL-CCNC: 11 U/L (ref 1–40)
AST SERPL-CCNC: 12 U/L (ref 1–40)
AST SERPL-CCNC: 13 U/L (ref 1–40)
AST SERPL-CCNC: 16 U/L (ref 1–40)
AST SERPL-CCNC: 16 U/L (ref 1–40)
AST SERPL-CCNC: 21 U/L (ref 1–40)
BACTERIA SPEC AEROBE CULT: ABNORMAL
BACTERIA SPEC AEROBE CULT: NO GROWTH
BACTERIA SPEC AEROBE CULT: NO GROWTH
BACTERIA SPEC AEROBE CULT: NORMAL
BACTERIA UR QL AUTO: ABNORMAL /HPF
BASOPHILS # BLD AUTO: 0.02 10*3/MM3 (ref 0–0.2)
BASOPHILS # BLD AUTO: 0.03 10*3/MM3 (ref 0–0.2)
BASOPHILS # BLD AUTO: 0.03 10*3/MM3 (ref 0–0.2)
BASOPHILS # BLD AUTO: 0.04 10*3/MM3 (ref 0–0.2)
BASOPHILS # BLD AUTO: 0.05 10*3/MM3 (ref 0–0.2)
BASOPHILS # BLD AUTO: 0.05 10*3/MM3 (ref 0–0.2)
BASOPHILS # BLD AUTO: 0.06 10*3/MM3 (ref 0–0.2)
BASOPHILS # BLD AUTO: 0.06 10*3/MM3 (ref 0–0.2)
BASOPHILS NFR BLD AUTO: 0.3 % (ref 0–1.5)
BASOPHILS NFR BLD AUTO: 0.4 % (ref 0–1.5)
BASOPHILS NFR BLD AUTO: 0.5 % (ref 0–1.5)
BASOPHILS NFR BLD AUTO: 0.5 % (ref 0–1.5)
BASOPHILS NFR BLD AUTO: 0.6 % (ref 0–1.5)
BASOPHILS NFR BLD AUTO: 0.6 % (ref 0–1.5)
BASOPHILS NFR BLD AUTO: 0.7 % (ref 0–1.5)
BASOPHILS NFR BLD AUTO: 0.7 % (ref 0–1.5)
BH CV ECHO MEAS - AO MAX PG (FULL): 1.4 MMHG
BH CV ECHO MEAS - AO MAX PG: 4.8 MMHG
BH CV ECHO MEAS - AO MEAN PG (FULL): 2 MMHG
BH CV ECHO MEAS - AO MEAN PG: 3 MMHG
BH CV ECHO MEAS - AO V2 MAX: 109 CM/SEC
BH CV ECHO MEAS - AO V2 MEAN: 79.7 CM/SEC
BH CV ECHO MEAS - AO V2 VTI: 20.2 CM
BH CV ECHO MEAS - AVA(I,A): 2.2 CM^2
BH CV ECHO MEAS - AVA(I,D): 2.2 CM^2
BH CV ECHO MEAS - AVA(V,A): 2.6 CM^2
BH CV ECHO MEAS - AVA(V,D): 2.6 CM^2
BH CV ECHO MEAS - BSA(HAYCOCK): 2.5 M^2
BH CV ECHO MEAS - BSA: 2.4 M^2
BH CV ECHO MEAS - BZI_BMI: 33.4 KILOGRAMS/M^2
BH CV ECHO MEAS - BZI_METRIC_HEIGHT: 188 CM
BH CV ECHO MEAS - BZI_METRIC_WEIGHT: 117.9 KG
BH CV ECHO MEAS - EDV(CUBED): 122.8 ML
BH CV ECHO MEAS - EDV(MOD-SP4): 124 ML
BH CV ECHO MEAS - EDV(TEICH): 116.6 ML
BH CV ECHO MEAS - EF(CUBED): 68.3 %
BH CV ECHO MEAS - EF(MOD-SP4): 73 %
BH CV ECHO MEAS - EF(TEICH): 59.6 %
BH CV ECHO MEAS - ESV(CUBED): 39 ML
BH CV ECHO MEAS - ESV(MOD-SP4): 33.5 ML
BH CV ECHO MEAS - ESV(TEICH): 47.1 ML
BH CV ECHO MEAS - FS: 31.8 %
BH CV ECHO MEAS - IVS/LVPW: 1
BH CV ECHO MEAS - IVSD: 1.1 CM
BH CV ECHO MEAS - LA DIMENSION: 4.1 CM
BH CV ECHO MEAS - LAT PEAK E' VEL: 7.2 CM/SEC
BH CV ECHO MEAS - LV DIASTOLIC VOL/BSA (35-75): 51 ML/M^2
BH CV ECHO MEAS - LV MASS(C)D: 200 GRAMS
BH CV ECHO MEAS - LV MASS(C)DI: 82.3 GRAMS/M^2
BH CV ECHO MEAS - LV MAX PG: 3.3 MMHG
BH CV ECHO MEAS - LV MEAN PG: 1 MMHG
BH CV ECHO MEAS - LV SYSTOLIC VOL/BSA (12-30): 13.8 ML/M^2
BH CV ECHO MEAS - LV V1 MAX: 91.4 CM/SEC
BH CV ECHO MEAS - LV V1 MEAN: 54.8 CM/SEC
BH CV ECHO MEAS - LV V1 VTI: 14.3 CM
BH CV ECHO MEAS - LVIDD: 5 CM
BH CV ECHO MEAS - LVIDS: 3.4 CM
BH CV ECHO MEAS - LVLD AP4: 8.3 CM
BH CV ECHO MEAS - LVLS AP4: 5.7 CM
BH CV ECHO MEAS - LVOT AREA (M): 3.1 CM^2
BH CV ECHO MEAS - LVOT AREA: 3.1 CM^2
BH CV ECHO MEAS - LVOT DIAM: 2 CM
BH CV ECHO MEAS - LVPWD: 1.1 CM
BH CV ECHO MEAS - MED PEAK E' VEL: 6.31 CM/SEC
BH CV ECHO MEAS - MV A MAX VEL: 109 CM/SEC
BH CV ECHO MEAS - MV DEC SLOPE: 279 CM/SEC^2
BH CV ECHO MEAS - MV DEC TIME: 0.25 SEC
BH CV ECHO MEAS - MV E MAX VEL: 69.4 CM/SEC
BH CV ECHO MEAS - MV E/A: 0.64
BH CV ECHO MEAS - RAP SYSTOLE: 5 MMHG
BH CV ECHO MEAS - RVSP: 19.9 MMHG
BH CV ECHO MEAS - SI(CUBED): 34.5 ML/M^2
BH CV ECHO MEAS - SI(LVOT): 18.5 ML/M^2
BH CV ECHO MEAS - SI(MOD-SP4): 37.3 ML/M^2
BH CV ECHO MEAS - SI(TEICH): 28.6 ML/M^2
BH CV ECHO MEAS - SV(CUBED): 83.8 ML
BH CV ECHO MEAS - SV(LVOT): 44.9 ML
BH CV ECHO MEAS - SV(MOD-SP4): 90.5 ML
BH CV ECHO MEAS - SV(TEICH): 69.5 ML
BH CV ECHO MEAS - TR MAX VEL: 193 CM/SEC
BH CV ECHO MEASUREMENTS AVERAGE E/E' RATIO: 10.27
BILIRUB CONJ SERPL-MCNC: 0.3 MG/DL (ref 0–0.3)
BILIRUB INDIRECT SERPL-MCNC: 0.4 MG/DL
BILIRUB SERPL-MCNC: 0.5 MG/DL (ref 0–1.2)
BILIRUB SERPL-MCNC: 0.6 MG/DL (ref 0–1.2)
BILIRUB SERPL-MCNC: 0.7 MG/DL (ref 0–1.2)
BILIRUB SERPL-MCNC: 0.7 MG/DL (ref 0–1.2)
BILIRUB SERPL-MCNC: 0.8 MG/DL (ref 0–1.2)
BILIRUB UR QL STRIP: ABNORMAL
BILIRUB UR QL STRIP: NEGATIVE
BUN SERPL-MCNC: 10 MG/DL (ref 8–23)
BUN SERPL-MCNC: 11 MG/DL (ref 8–23)
BUN SERPL-MCNC: 12 MG/DL (ref 8–23)
BUN SERPL-MCNC: 12 MG/DL (ref 8–23)
BUN SERPL-MCNC: 13 MG/DL (ref 8–23)
BUN SERPL-MCNC: 15 MG/DL (ref 8–23)
BUN SERPL-MCNC: 16 MG/DL (ref 8–23)
BUN SERPL-MCNC: 17 MG/DL (ref 8–23)
BUN SERPL-MCNC: 18 MG/DL (ref 8–23)
BUN SERPL-MCNC: 22 MG/DL (ref 8–23)
BUN SERPL-MCNC: 25 MG/DL (ref 8–23)
BUN SERPL-MCNC: 32 MG/DL (ref 8–23)
BUN SERPL-MCNC: 8 MG/DL (ref 8–23)
BUN SERPL-MCNC: 9 MG/DL (ref 8–23)
BUN/CREAT SERPL: 10 (ref 7–25)
BUN/CREAT SERPL: 10.3 (ref 7–25)
BUN/CREAT SERPL: 11.1 (ref 7–25)
BUN/CREAT SERPL: 12.2 (ref 7–25)
BUN/CREAT SERPL: 12.2 (ref 7–25)
BUN/CREAT SERPL: 12.3 (ref 7–25)
BUN/CREAT SERPL: 12.8 (ref 7–25)
BUN/CREAT SERPL: 14.8 (ref 7–25)
BUN/CREAT SERPL: 14.8 (ref 7–25)
BUN/CREAT SERPL: 16.8 (ref 7–25)
BUN/CREAT SERPL: 18.8 (ref 7–25)
BUN/CREAT SERPL: 20.2 (ref 7–25)
BUN/CREAT SERPL: 20.5 (ref 7–25)
BUN/CREAT SERPL: 21.8 (ref 7–25)
BUN/CREAT SERPL: 26 (ref 7–25)
BUN/CREAT SERPL: 26.7 (ref 7–25)
CALCIUM SPEC-SCNC: 8.3 MG/DL (ref 8.6–10.5)
CALCIUM SPEC-SCNC: 8.4 MG/DL (ref 8.6–10.5)
CALCIUM SPEC-SCNC: 8.6 MG/DL (ref 8.6–10.5)
CALCIUM SPEC-SCNC: 8.9 MG/DL (ref 8.6–10.5)
CALCIUM SPEC-SCNC: 9 MG/DL (ref 8.6–10.5)
CALCIUM SPEC-SCNC: 9 MG/DL (ref 8.6–10.5)
CALCIUM SPEC-SCNC: 9.1 MG/DL (ref 8.6–10.5)
CALCIUM SPEC-SCNC: 9.1 MG/DL (ref 8.6–10.5)
CALCIUM SPEC-SCNC: 9.3 MG/DL (ref 8.6–10.5)
CALCIUM SPEC-SCNC: 9.3 MG/DL (ref 8.6–10.5)
CALCIUM SPEC-SCNC: 9.4 MG/DL (ref 8.6–10.5)
CALCIUM SPEC-SCNC: 9.4 MG/DL (ref 8.6–10.5)
CALCIUM SPEC-SCNC: 9.5 MG/DL (ref 8.6–10.5)
CALCIUM SPEC-SCNC: 9.6 MG/DL (ref 8.6–10.5)
CALCIUM SPEC-SCNC: 9.8 MG/DL (ref 8.6–10.5)
CALCIUM SPEC-SCNC: 9.9 MG/DL (ref 8.6–10.5)
CHLORIDE SERPL-SCNC: 100 MMOL/L (ref 98–107)
CHLORIDE SERPL-SCNC: 101 MMOL/L (ref 98–107)
CHLORIDE SERPL-SCNC: 102 MMOL/L (ref 98–107)
CHLORIDE SERPL-SCNC: 102 MMOL/L (ref 98–107)
CHLORIDE SERPL-SCNC: 104 MMOL/L (ref 98–107)
CHLORIDE SERPL-SCNC: 106 MMOL/L (ref 98–107)
CHLORIDE SERPL-SCNC: 106 MMOL/L (ref 98–107)
CHLORIDE SERPL-SCNC: 107 MMOL/L (ref 98–107)
CHLORIDE SERPL-SCNC: 108 MMOL/L (ref 98–107)
CHLORIDE SERPL-SCNC: 109 MMOL/L (ref 98–107)
CHLORIDE SERPL-SCNC: 109 MMOL/L (ref 98–107)
CHLORIDE SERPL-SCNC: 110 MMOL/L (ref 98–107)
CHLORIDE SERPL-SCNC: 110 MMOL/L (ref 98–107)
CHLORIDE SERPL-SCNC: 98 MMOL/L (ref 98–107)
CHLORIDE SERPL-SCNC: 98 MMOL/L (ref 98–107)
CHLORIDE SERPL-SCNC: 99 MMOL/L (ref 98–107)
CHOLEST SERPL-MCNC: 135 MG/DL (ref 0–200)
CK SERPL-CCNC: 44 U/L (ref 20–200)
CLARITY UR: ABNORMAL
CLARITY UR: CLEAR
CLARITY UR: CLEAR
CO2 SERPL-SCNC: 21 MMOL/L (ref 22–29)
CO2 SERPL-SCNC: 24 MMOL/L (ref 22–29)
CO2 SERPL-SCNC: 25 MMOL/L (ref 22–29)
CO2 SERPL-SCNC: 26 MMOL/L (ref 22–29)
CO2 SERPL-SCNC: 27 MMOL/L (ref 22–29)
CO2 SERPL-SCNC: 29 MMOL/L (ref 22–29)
COLOR UR: ABNORMAL
COLOR UR: ABNORMAL
COLOR UR: YELLOW
CREAT SERPL-MCNC: 0.78 MG/DL (ref 0.76–1.27)
CREAT SERPL-MCNC: 0.8 MG/DL (ref 0.76–1.27)
CREAT SERPL-MCNC: 0.8 MG/DL (ref 0.76–1.27)
CREAT SERPL-MCNC: 0.81 MG/DL (ref 0.76–1.27)
CREAT SERPL-MCNC: 0.81 MG/DL (ref 0.76–1.27)
CREAT SERPL-MCNC: 0.84 MG/DL (ref 0.76–1.27)
CREAT SERPL-MCNC: 0.87 MG/DL (ref 0.76–1.27)
CREAT SERPL-MCNC: 0.88 MG/DL (ref 0.76–1.27)
CREAT SERPL-MCNC: 0.88 MG/DL (ref 0.76–1.27)
CREAT SERPL-MCNC: 0.9 MG/DL (ref 0.76–1.27)
CREAT SERPL-MCNC: 0.9 MG/DL (ref 0.76–1.27)
CREAT SERPL-MCNC: 0.95 MG/DL (ref 0.76–1.27)
CREAT SERPL-MCNC: 0.96 MG/DL (ref 0.76–1.27)
CREAT SERPL-MCNC: 0.98 MG/DL (ref 0.76–1.27)
CREAT SERPL-MCNC: 1.01 MG/DL (ref 0.76–1.27)
CREAT SERPL-MCNC: 1.2 MG/DL (ref 0.76–1.27)
CRP SERPL-MCNC: 7.94 MG/DL (ref 0–0.5)
D DIMER PPP FEU-MCNC: 14.13 MG/L (FEU) (ref 0–0.5)
D-LACTATE SERPL-SCNC: 1.3 MMOL/L (ref 0.5–2)
D-LACTATE SERPL-SCNC: 1.5 MMOL/L (ref 0.5–2)
D-LACTATE SERPL-SCNC: 2.1 MMOL/L (ref 0.5–2)
D-LACTATE SERPL-SCNC: 2.2 MMOL/L (ref 0.5–2)
DEPRECATED RDW RBC AUTO: 42.6 FL (ref 37–54)
DEPRECATED RDW RBC AUTO: 42.7 FL (ref 37–54)
DEPRECATED RDW RBC AUTO: 42.7 FL (ref 37–54)
DEPRECATED RDW RBC AUTO: 42.8 FL (ref 37–54)
DEPRECATED RDW RBC AUTO: 42.9 FL (ref 37–54)
DEPRECATED RDW RBC AUTO: 43.2 FL (ref 37–54)
DEPRECATED RDW RBC AUTO: 43.3 FL (ref 37–54)
DEPRECATED RDW RBC AUTO: 43.3 FL (ref 37–54)
DEPRECATED RDW RBC AUTO: 43.8 FL (ref 37–54)
DEPRECATED RDW RBC AUTO: 44.2 FL (ref 37–54)
DEPRECATED RDW RBC AUTO: 45.8 FL (ref 37–54)
DEPRECATED RDW RBC AUTO: 46 FL (ref 37–54)
DEPRECATED RDW RBC AUTO: 46.2 FL (ref 37–54)
DEPRECATED RDW RBC AUTO: 46.6 FL (ref 37–54)
DEPRECATED RDW RBC AUTO: 46.6 FL (ref 37–54)
DEPRECATED RDW RBC AUTO: 47.2 FL (ref 37–54)
EOSINOPHIL # BLD AUTO: 0.01 10*3/MM3 (ref 0–0.4)
EOSINOPHIL # BLD AUTO: 0.01 10*3/MM3 (ref 0–0.4)
EOSINOPHIL # BLD AUTO: 0.03 10*3/MM3 (ref 0–0.4)
EOSINOPHIL # BLD AUTO: 0.04 10*3/MM3 (ref 0–0.4)
EOSINOPHIL # BLD AUTO: 0.05 10*3/MM3 (ref 0–0.4)
EOSINOPHIL # BLD AUTO: 0.06 10*3/MM3 (ref 0–0.4)
EOSINOPHIL # BLD AUTO: 0.08 10*3/MM3 (ref 0–0.4)
EOSINOPHIL # BLD AUTO: 0.09 10*3/MM3 (ref 0–0.4)
EOSINOPHIL # BLD AUTO: 0.1 10*3/MM3 (ref 0–0.4)
EOSINOPHIL # BLD AUTO: 0.13 10*3/MM3 (ref 0–0.4)
EOSINOPHIL NFR BLD AUTO: 0.1 % (ref 0.3–6.2)
EOSINOPHIL NFR BLD AUTO: 0.1 % (ref 0.3–6.2)
EOSINOPHIL NFR BLD AUTO: 0.3 % (ref 0.3–6.2)
EOSINOPHIL NFR BLD AUTO: 0.6 % (ref 0.3–6.2)
EOSINOPHIL NFR BLD AUTO: 0.7 % (ref 0.3–6.2)
EOSINOPHIL NFR BLD AUTO: 0.8 % (ref 0.3–6.2)
EOSINOPHIL NFR BLD AUTO: 0.9 % (ref 0.3–6.2)
EOSINOPHIL NFR BLD AUTO: 1 % (ref 0.3–6.2)
EOSINOPHIL NFR BLD AUTO: 1.1 % (ref 0.3–6.2)
EOSINOPHIL NFR BLD AUTO: 2 % (ref 0.3–6.2)
ERYTHROCYTE [DISTWIDTH] IN BLOOD BY AUTOMATED COUNT: 13.8 % (ref 12.3–15.4)
ERYTHROCYTE [DISTWIDTH] IN BLOOD BY AUTOMATED COUNT: 13.9 % (ref 12.3–15.4)
ERYTHROCYTE [DISTWIDTH] IN BLOOD BY AUTOMATED COUNT: 13.9 % (ref 12.3–15.4)
ERYTHROCYTE [DISTWIDTH] IN BLOOD BY AUTOMATED COUNT: 14 % (ref 12.3–15.4)
ERYTHROCYTE [DISTWIDTH] IN BLOOD BY AUTOMATED COUNT: 14.1 % (ref 12.3–15.4)
ERYTHROCYTE [DISTWIDTH] IN BLOOD BY AUTOMATED COUNT: 14.2 % (ref 12.3–15.4)
ERYTHROCYTE [DISTWIDTH] IN BLOOD BY AUTOMATED COUNT: 14.6 % (ref 12.3–15.4)
ERYTHROCYTE [DISTWIDTH] IN BLOOD BY AUTOMATED COUNT: 14.9 % (ref 12.3–15.4)
ERYTHROCYTE [DISTWIDTH] IN BLOOD BY AUTOMATED COUNT: 14.9 % (ref 12.3–15.4)
FIBRINOGEN PPP-MCNC: 288 MG/DL (ref 240–460)
FIBRINOGEN PPP-MCNC: 332 MG/DL (ref 240–460)
GFR SERPL CREATININE-BSD FRML MDRD: 59 ML/MIN/1.73
GFR SERPL CREATININE-BSD FRML MDRD: 71 ML/MIN/1.73
GFR SERPL CREATININE-BSD FRML MDRD: 74 ML/MIN/1.73
GFR SERPL CREATININE-BSD FRML MDRD: 76 ML/MIN/1.73
GFR SERPL CREATININE-BSD FRML MDRD: 77 ML/MIN/1.73
GFR SERPL CREATININE-BSD FRML MDRD: 82 ML/MIN/1.73
GFR SERPL CREATININE-BSD FRML MDRD: 82 ML/MIN/1.73
GFR SERPL CREATININE-BSD FRML MDRD: 84 ML/MIN/1.73
GFR SERPL CREATININE-BSD FRML MDRD: 84 ML/MIN/1.73
GFR SERPL CREATININE-BSD FRML MDRD: 85 ML/MIN/1.73
GFR SERPL CREATININE-BSD FRML MDRD: 88 ML/MIN/1.73
GFR SERPL CREATININE-BSD FRML MDRD: 92 ML/MIN/1.73
GFR SERPL CREATININE-BSD FRML MDRD: 92 ML/MIN/1.73
GFR SERPL CREATININE-BSD FRML MDRD: 93 ML/MIN/1.73
GFR SERPL CREATININE-BSD FRML MDRD: 93 ML/MIN/1.73
GFR SERPL CREATININE-BSD FRML MDRD: 96 ML/MIN/1.73
GLOBULIN UR ELPH-MCNC: 2.2 GM/DL
GLOBULIN UR ELPH-MCNC: 2.5 GM/DL
GLOBULIN UR ELPH-MCNC: 2.5 GM/DL
GLOBULIN UR ELPH-MCNC: 2.8 GM/DL
GLOBULIN UR ELPH-MCNC: 3 GM/DL
GLUCOSE BLDC GLUCOMTR-MCNC: 114 MG/DL (ref 70–130)
GLUCOSE BLDC GLUCOMTR-MCNC: 116 MG/DL (ref 70–130)
GLUCOSE BLDC GLUCOMTR-MCNC: 120 MG/DL (ref 70–130)
GLUCOSE BLDC GLUCOMTR-MCNC: 147 MG/DL (ref 70–130)
GLUCOSE BLDC GLUCOMTR-MCNC: 155 MG/DL (ref 70–130)
GLUCOSE BLDC GLUCOMTR-MCNC: 155 MG/DL (ref 70–130)
GLUCOSE BLDC GLUCOMTR-MCNC: 158 MG/DL (ref 70–130)
GLUCOSE BLDC GLUCOMTR-MCNC: 159 MG/DL (ref 70–130)
GLUCOSE BLDC GLUCOMTR-MCNC: 159 MG/DL (ref 70–130)
GLUCOSE BLDC GLUCOMTR-MCNC: 160 MG/DL (ref 70–130)
GLUCOSE BLDC GLUCOMTR-MCNC: 163 MG/DL (ref 70–130)
GLUCOSE BLDC GLUCOMTR-MCNC: 169 MG/DL (ref 70–130)
GLUCOSE BLDC GLUCOMTR-MCNC: 170 MG/DL (ref 70–130)
GLUCOSE BLDC GLUCOMTR-MCNC: 176 MG/DL (ref 70–130)
GLUCOSE BLDC GLUCOMTR-MCNC: 178 MG/DL (ref 70–130)
GLUCOSE BLDC GLUCOMTR-MCNC: 187 MG/DL (ref 70–130)
GLUCOSE BLDC GLUCOMTR-MCNC: 187 MG/DL (ref 70–130)
GLUCOSE BLDC GLUCOMTR-MCNC: 191 MG/DL (ref 70–130)
GLUCOSE BLDC GLUCOMTR-MCNC: 192 MG/DL (ref 70–130)
GLUCOSE BLDC GLUCOMTR-MCNC: 194 MG/DL (ref 70–130)
GLUCOSE BLDC GLUCOMTR-MCNC: 195 MG/DL (ref 70–130)
GLUCOSE BLDC GLUCOMTR-MCNC: 197 MG/DL (ref 70–130)
GLUCOSE BLDC GLUCOMTR-MCNC: 198 MG/DL (ref 70–130)
GLUCOSE BLDC GLUCOMTR-MCNC: 200 MG/DL (ref 70–130)
GLUCOSE BLDC GLUCOMTR-MCNC: 205 MG/DL (ref 70–130)
GLUCOSE BLDC GLUCOMTR-MCNC: 205 MG/DL (ref 70–130)
GLUCOSE BLDC GLUCOMTR-MCNC: 208 MG/DL (ref 70–130)
GLUCOSE BLDC GLUCOMTR-MCNC: 211 MG/DL (ref 70–130)
GLUCOSE BLDC GLUCOMTR-MCNC: 211 MG/DL (ref 70–130)
GLUCOSE BLDC GLUCOMTR-MCNC: 214 MG/DL (ref 70–130)
GLUCOSE BLDC GLUCOMTR-MCNC: 214 MG/DL (ref 70–130)
GLUCOSE BLDC GLUCOMTR-MCNC: 217 MG/DL (ref 70–130)
GLUCOSE BLDC GLUCOMTR-MCNC: 225 MG/DL (ref 70–130)
GLUCOSE BLDC GLUCOMTR-MCNC: 228 MG/DL (ref 70–130)
GLUCOSE BLDC GLUCOMTR-MCNC: 231 MG/DL (ref 70–130)
GLUCOSE BLDC GLUCOMTR-MCNC: 238 MG/DL (ref 70–130)
GLUCOSE BLDC GLUCOMTR-MCNC: 260 MG/DL (ref 70–130)
GLUCOSE BLDC GLUCOMTR-MCNC: 261 MG/DL (ref 70–130)
GLUCOSE BLDC GLUCOMTR-MCNC: 270 MG/DL (ref 70–130)
GLUCOSE BLDC GLUCOMTR-MCNC: 270 MG/DL (ref 70–130)
GLUCOSE BLDC GLUCOMTR-MCNC: 277 MG/DL (ref 70–130)
GLUCOSE BLDC GLUCOMTR-MCNC: 278 MG/DL (ref 70–130)
GLUCOSE BLDC GLUCOMTR-MCNC: 280 MG/DL (ref 70–130)
GLUCOSE BLDC GLUCOMTR-MCNC: 280 MG/DL (ref 70–130)
GLUCOSE BLDC GLUCOMTR-MCNC: 300 MG/DL (ref 70–130)
GLUCOSE BLDC GLUCOMTR-MCNC: 305 MG/DL (ref 70–130)
GLUCOSE BLDC GLUCOMTR-MCNC: 312 MG/DL (ref 70–130)
GLUCOSE BLDC GLUCOMTR-MCNC: 314 MG/DL (ref 70–130)
GLUCOSE BLDC GLUCOMTR-MCNC: 373 MG/DL (ref 70–130)
GLUCOSE SERPL-MCNC: 130 MG/DL (ref 65–99)
GLUCOSE SERPL-MCNC: 158 MG/DL (ref 65–99)
GLUCOSE SERPL-MCNC: 158 MG/DL (ref 65–99)
GLUCOSE SERPL-MCNC: 166 MG/DL (ref 65–99)
GLUCOSE SERPL-MCNC: 187 MG/DL (ref 65–99)
GLUCOSE SERPL-MCNC: 195 MG/DL (ref 65–99)
GLUCOSE SERPL-MCNC: 208 MG/DL (ref 65–99)
GLUCOSE SERPL-MCNC: 214 MG/DL (ref 65–99)
GLUCOSE SERPL-MCNC: 217 MG/DL (ref 65–99)
GLUCOSE SERPL-MCNC: 225 MG/DL (ref 65–99)
GLUCOSE SERPL-MCNC: 239 MG/DL (ref 65–99)
GLUCOSE SERPL-MCNC: 242 MG/DL (ref 65–99)
GLUCOSE SERPL-MCNC: 282 MG/DL (ref 65–99)
GLUCOSE SERPL-MCNC: 284 MG/DL (ref 65–99)
GLUCOSE SERPL-MCNC: 284 MG/DL (ref 65–99)
GLUCOSE SERPL-MCNC: 446 MG/DL (ref 65–99)
GLUCOSE UR STRIP-MCNC: ABNORMAL MG/DL
GLUCOSE UR STRIP-MCNC: NEGATIVE MG/DL
GLUCOSE UR STRIP-MCNC: NEGATIVE MG/DL
GRAN CASTS URNS QL MICRO: ABNORMAL /LPF
HBA1C MFR BLD: 8.3 % (ref 4.8–5.6)
HBA1C MFR BLD: 8.3 % (ref 4.8–5.6)
HBA1C MFR BLD: 9.4 % (ref 4.8–5.6)
HCT VFR BLD AUTO: 27.8 % (ref 37.5–51)
HCT VFR BLD AUTO: 28.2 % (ref 37.5–51)
HCT VFR BLD AUTO: 28.3 % (ref 37.5–51)
HCT VFR BLD AUTO: 28.5 % (ref 37.5–51)
HCT VFR BLD AUTO: 31.6 % (ref 37.5–51)
HCT VFR BLD AUTO: 33.8 % (ref 37.5–51)
HCT VFR BLD AUTO: 36.4 % (ref 37.5–51)
HCT VFR BLD AUTO: 36.5 % (ref 37.5–51)
HCT VFR BLD AUTO: 37.6 % (ref 37.5–51)
HCT VFR BLD AUTO: 40.7 % (ref 37.5–51)
HCT VFR BLD AUTO: 40.8 % (ref 37.5–51)
HCT VFR BLD AUTO: 41.3 % (ref 37.5–51)
HCT VFR BLD AUTO: 41.9 % (ref 37.5–51)
HCT VFR BLD AUTO: 41.9 % (ref 37.5–51)
HCT VFR BLD AUTO: 43.8 % (ref 37.5–51)
HCT VFR BLD AUTO: 45.9 % (ref 37.5–51)
HDLC SERPL-MCNC: 38 MG/DL (ref 40–60)
HGB BLD-MCNC: 10.1 G/DL (ref 13–17.7)
HGB BLD-MCNC: 10.3 G/DL (ref 13–17.7)
HGB BLD-MCNC: 10.3 G/DL (ref 13–17.7)
HGB BLD-MCNC: 11 G/DL (ref 13–17.7)
HGB BLD-MCNC: 12.3 G/DL (ref 13–17.7)
HGB BLD-MCNC: 12.3 G/DL (ref 13–17.7)
HGB BLD-MCNC: 12.6 G/DL (ref 13–17.7)
HGB BLD-MCNC: 12.9 G/DL (ref 13–17.7)
HGB BLD-MCNC: 13.5 G/DL (ref 13–17.7)
HGB BLD-MCNC: 13.7 G/DL (ref 13–17.7)
HGB BLD-MCNC: 13.8 G/DL (ref 13–17.7)
HGB BLD-MCNC: 14 G/DL (ref 13–17.7)
HGB BLD-MCNC: 14.2 G/DL (ref 13–17.7)
HGB BLD-MCNC: 14.6 G/DL (ref 13–17.7)
HGB BLD-MCNC: 15.3 G/DL (ref 13–17.7)
HGB BLD-MCNC: 9.7 G/DL (ref 13–17.7)
HGB UR QL STRIP.AUTO: ABNORMAL
HGB UR QL STRIP.AUTO: NEGATIVE
HGB UR QL STRIP.AUTO: NEGATIVE
HOLD SPECIMEN: NORMAL
HOLD SPECIMEN: NORMAL
HYALINE CASTS UR QL AUTO: ABNORMAL /LPF
IMM GRANULOCYTES # BLD AUTO: 0.01 10*3/MM3 (ref 0–0.05)
IMM GRANULOCYTES # BLD AUTO: 0.02 10*3/MM3 (ref 0–0.05)
IMM GRANULOCYTES # BLD AUTO: 0.02 10*3/MM3 (ref 0–0.05)
IMM GRANULOCYTES # BLD AUTO: 0.03 10*3/MM3 (ref 0–0.05)
IMM GRANULOCYTES # BLD AUTO: 0.05 10*3/MM3 (ref 0–0.05)
IMM GRANULOCYTES # BLD AUTO: 0.05 10*3/MM3 (ref 0–0.05)
IMM GRANULOCYTES # BLD AUTO: 0.07 10*3/MM3 (ref 0–0.05)
IMM GRANULOCYTES # BLD AUTO: 0.08 10*3/MM3 (ref 0–0.05)
IMM GRANULOCYTES NFR BLD AUTO: 0.2 % (ref 0–0.5)
IMM GRANULOCYTES NFR BLD AUTO: 0.2 % (ref 0–0.5)
IMM GRANULOCYTES NFR BLD AUTO: 0.3 % (ref 0–0.5)
IMM GRANULOCYTES NFR BLD AUTO: 0.3 % (ref 0–0.5)
IMM GRANULOCYTES NFR BLD AUTO: 0.4 % (ref 0–0.5)
IMM GRANULOCYTES NFR BLD AUTO: 0.4 % (ref 0–0.5)
IMM GRANULOCYTES NFR BLD AUTO: 0.5 % (ref 0–0.5)
IMM GRANULOCYTES NFR BLD AUTO: 0.5 % (ref 0–0.5)
IMM GRANULOCYTES NFR BLD AUTO: 0.6 % (ref 0–0.5)
IMM GRANULOCYTES NFR BLD AUTO: 0.7 % (ref 0–0.5)
INR PPP: 1.08 (ref 0.91–1.09)
INR PPP: 1.22 (ref 0.91–1.09)
INR PPP: 1.25 (ref 0.91–1.09)
INR PPP: 1.3 (ref 0.91–1.09)
INR PPP: 1.42 (ref 0.91–1.09)
KETONES UR QL STRIP: ABNORMAL
LDLC SERPL CALC-MCNC: 74 MG/DL (ref 0–100)
LDLC/HDLC SERPL: 1.88 {RATIO}
LEFT ATRIUM VOLUME INDEX: 23.8 ML/M2
LEFT ATRIUM VOLUME: 57.3 CM3
LEUKOCYTE ESTERASE UR QL STRIP.AUTO: ABNORMAL
LEUKOCYTE ESTERASE UR QL STRIP.AUTO: NEGATIVE
LEUKOCYTE ESTERASE UR QL STRIP.AUTO: NEGATIVE
LIPASE SERPL-CCNC: 14 U/L (ref 13–60)
LIPASE SERPL-CCNC: 15 U/L (ref 13–60)
LYMPHOCYTES # BLD AUTO: 1.03 10*3/MM3 (ref 0.7–3.1)
LYMPHOCYTES # BLD AUTO: 1.2 10*3/MM3 (ref 0.7–3.1)
LYMPHOCYTES # BLD AUTO: 1.3 10*3/MM3 (ref 0.7–3.1)
LYMPHOCYTES # BLD AUTO: 1.37 10*3/MM3 (ref 0.7–3.1)
LYMPHOCYTES # BLD AUTO: 1.47 10*3/MM3 (ref 0.7–3.1)
LYMPHOCYTES # BLD AUTO: 1.49 10*3/MM3 (ref 0.7–3.1)
LYMPHOCYTES # BLD AUTO: 1.5 10*3/MM3 (ref 0.7–3.1)
LYMPHOCYTES # BLD AUTO: 1.69 10*3/MM3 (ref 0.7–3.1)
LYMPHOCYTES # BLD AUTO: 1.7 10*3/MM3 (ref 0.7–3.1)
LYMPHOCYTES # BLD AUTO: 1.74 10*3/MM3 (ref 0.7–3.1)
LYMPHOCYTES NFR BLD AUTO: 10.5 % (ref 19.6–45.3)
LYMPHOCYTES NFR BLD AUTO: 12.7 % (ref 19.6–45.3)
LYMPHOCYTES NFR BLD AUTO: 13 % (ref 19.6–45.3)
LYMPHOCYTES NFR BLD AUTO: 13.4 % (ref 19.6–45.3)
LYMPHOCYTES NFR BLD AUTO: 17.8 % (ref 19.6–45.3)
LYMPHOCYTES NFR BLD AUTO: 18 % (ref 19.6–45.3)
LYMPHOCYTES NFR BLD AUTO: 19.6 % (ref 19.6–45.3)
LYMPHOCYTES NFR BLD AUTO: 20.1 % (ref 19.6–45.3)
LYMPHOCYTES NFR BLD AUTO: 22.3 % (ref 19.6–45.3)
LYMPHOCYTES NFR BLD AUTO: 25.1 % (ref 19.6–45.3)
MAGNESIUM SERPL-MCNC: 1.9 MG/DL (ref 1.6–2.4)
MAGNESIUM SERPL-MCNC: 2.1 MG/DL (ref 1.6–2.4)
MAGNESIUM SERPL-MCNC: 2.1 MG/DL (ref 1.6–2.4)
MAXIMAL PREDICTED HEART RATE: 142 BPM
MCH RBC QN AUTO: 28.6 PG (ref 26.6–33)
MCH RBC QN AUTO: 28.7 PG (ref 26.6–33)
MCH RBC QN AUTO: 28.8 PG (ref 26.6–33)
MCH RBC QN AUTO: 28.9 PG (ref 26.6–33)
MCH RBC QN AUTO: 29 PG (ref 26.6–33)
MCH RBC QN AUTO: 29.1 PG (ref 26.6–33)
MCH RBC QN AUTO: 29.3 PG (ref 26.6–33)
MCH RBC QN AUTO: 29.3 PG (ref 26.6–33)
MCH RBC QN AUTO: 29.4 PG (ref 26.6–33)
MCH RBC QN AUTO: 30.1 PG (ref 26.6–33)
MCH RBC QN AUTO: 30.8 PG (ref 26.6–33)
MCH RBC QN AUTO: 31.2 PG (ref 26.6–33)
MCH RBC QN AUTO: 31.2 PG (ref 26.6–33)
MCH RBC QN AUTO: 31.3 PG (ref 26.6–33)
MCHC RBC AUTO-ENTMCNC: 32.4 G/DL (ref 31.5–35.7)
MCHC RBC AUTO-ENTMCNC: 32.9 G/DL (ref 31.5–35.7)
MCHC RBC AUTO-ENTMCNC: 33.2 G/DL (ref 31.5–35.7)
MCHC RBC AUTO-ENTMCNC: 33.2 G/DL (ref 31.5–35.7)
MCHC RBC AUTO-ENTMCNC: 33.3 G/DL (ref 31.5–35.7)
MCHC RBC AUTO-ENTMCNC: 33.8 G/DL (ref 31.5–35.7)
MCHC RBC AUTO-ENTMCNC: 34.3 G/DL (ref 31.5–35.7)
MCHC RBC AUTO-ENTMCNC: 34.3 G/DL (ref 31.5–35.7)
MCHC RBC AUTO-ENTMCNC: 34.5 G/DL (ref 31.5–35.7)
MCHC RBC AUTO-ENTMCNC: 34.8 G/DL (ref 31.5–35.7)
MCHC RBC AUTO-ENTMCNC: 34.8 G/DL (ref 31.5–35.7)
MCHC RBC AUTO-ENTMCNC: 34.9 G/DL (ref 31.5–35.7)
MCHC RBC AUTO-ENTMCNC: 35.8 G/DL (ref 31.5–35.7)
MCHC RBC AUTO-ENTMCNC: 36.1 G/DL (ref 31.5–35.7)
MCHC RBC AUTO-ENTMCNC: 36.4 G/DL (ref 31.5–35.7)
MCHC RBC AUTO-ENTMCNC: 36.4 G/DL (ref 31.5–35.7)
MCV RBC AUTO: 84.3 FL (ref 79–97)
MCV RBC AUTO: 84.7 FL (ref 79–97)
MCV RBC AUTO: 85.3 FL (ref 79–97)
MCV RBC AUTO: 85.4 FL (ref 79–97)
MCV RBC AUTO: 85.8 FL (ref 79–97)
MCV RBC AUTO: 86 FL (ref 79–97)
MCV RBC AUTO: 86.3 FL (ref 79–97)
MCV RBC AUTO: 86.3 FL (ref 79–97)
MCV RBC AUTO: 86.6 FL (ref 79–97)
MCV RBC AUTO: 86.7 FL (ref 79–97)
MCV RBC AUTO: 86.8 FL (ref 79–97)
MCV RBC AUTO: 86.9 FL (ref 79–97)
MCV RBC AUTO: 87 FL (ref 79–97)
MCV RBC AUTO: 87.3 FL (ref 79–97)
MCV RBC AUTO: 87.3 FL (ref 79–97)
MCV RBC AUTO: 88.5 FL (ref 79–97)
MONOCYTES # BLD AUTO: 0.3 10*3/MM3 (ref 0.1–0.9)
MONOCYTES # BLD AUTO: 0.4 10*3/MM3 (ref 0.1–0.9)
MONOCYTES # BLD AUTO: 0.41 10*3/MM3 (ref 0.1–0.9)
MONOCYTES # BLD AUTO: 0.49 10*3/MM3 (ref 0.1–0.9)
MONOCYTES # BLD AUTO: 0.5 10*3/MM3 (ref 0.1–0.9)
MONOCYTES # BLD AUTO: 0.54 10*3/MM3 (ref 0.1–0.9)
MONOCYTES # BLD AUTO: 0.56 10*3/MM3 (ref 0.1–0.9)
MONOCYTES # BLD AUTO: 0.69 10*3/MM3 (ref 0.1–0.9)
MONOCYTES # BLD AUTO: 0.71 10*3/MM3 (ref 0.1–0.9)
MONOCYTES # BLD AUTO: 0.8 10*3/MM3 (ref 0.1–0.9)
MONOCYTES NFR BLD AUTO: 3.9 % (ref 5–12)
MONOCYTES NFR BLD AUTO: 5.3 % (ref 5–12)
MONOCYTES NFR BLD AUTO: 5.9 % (ref 5–12)
MONOCYTES NFR BLD AUTO: 5.9 % (ref 5–12)
MONOCYTES NFR BLD AUTO: 6 % (ref 5–12)
MONOCYTES NFR BLD AUTO: 6.1 % (ref 5–12)
MONOCYTES NFR BLD AUTO: 6.2 % (ref 5–12)
MONOCYTES NFR BLD AUTO: 6.7 % (ref 5–12)
MONOCYTES NFR BLD AUTO: 7.5 % (ref 5–12)
MONOCYTES NFR BLD AUTO: 7.8 % (ref 5–12)
MUCOUS THREADS URNS QL MICRO: ABNORMAL /HPF
NEUTROPHILS NFR BLD AUTO: 10.76 10*3/MM3 (ref 1.7–7)
NEUTROPHILS NFR BLD AUTO: 4.54 10*3/MM3 (ref 1.7–7)
NEUTROPHILS NFR BLD AUTO: 4.59 10*3/MM3 (ref 1.7–7)
NEUTROPHILS NFR BLD AUTO: 5.34 10*3/MM3 (ref 1.7–7)
NEUTROPHILS NFR BLD AUTO: 6.17 10*3/MM3 (ref 1.7–7)
NEUTROPHILS NFR BLD AUTO: 6.24 10*3/MM3 (ref 1.7–7)
NEUTROPHILS NFR BLD AUTO: 6.25 10*3/MM3 (ref 1.7–7)
NEUTROPHILS NFR BLD AUTO: 6.87 10*3/MM3 (ref 1.7–7)
NEUTROPHILS NFR BLD AUTO: 67.8 % (ref 42.7–76)
NEUTROPHILS NFR BLD AUTO: 68.8 % (ref 42.7–76)
NEUTROPHILS NFR BLD AUTO: 7.41 10*3/MM3 (ref 1.7–7)
NEUTROPHILS NFR BLD AUTO: 70.5 % (ref 42.7–76)
NEUTROPHILS NFR BLD AUTO: 71.3 % (ref 42.7–76)
NEUTROPHILS NFR BLD AUTO: 72.5 % (ref 42.7–76)
NEUTROPHILS NFR BLD AUTO: 74.3 % (ref 42.7–76)
NEUTROPHILS NFR BLD AUTO: 8.28 10*3/MM3 (ref 1.7–7)
NEUTROPHILS NFR BLD AUTO: 80.1 % (ref 42.7–76)
NEUTROPHILS NFR BLD AUTO: 80.8 % (ref 42.7–76)
NEUTROPHILS NFR BLD AUTO: 81.1 % (ref 42.7–76)
NEUTROPHILS NFR BLD AUTO: 82.4 % (ref 42.7–76)
NITRITE UR QL STRIP: NEGATIVE
NITRITE UR QL STRIP: POSITIVE
NITRITE UR QL STRIP: POSITIVE
NRBC BLD AUTO-RTO: 0 /100 WBC (ref 0–0.2)
NT-PROBNP SERPL-MCNC: 3298 PG/ML (ref 0–1800)
NT-PROBNP SERPL-MCNC: 456.8 PG/ML (ref 0–1800)
NT-PROBNP SERPL-MCNC: 476.3 PG/ML (ref 0–1800)
PH UR STRIP.AUTO: 6.5 [PH] (ref 5–8)
PH UR STRIP.AUTO: 7.5 [PH] (ref 5–8)
PH UR STRIP.AUTO: <=5 [PH] (ref 5–8)
PHOSPHATE SERPL-MCNC: 3.8 MG/DL (ref 2.5–4.5)
PLATELET # BLD AUTO: 100 10*3/MM3 (ref 140–450)
PLATELET # BLD AUTO: 116 10*3/MM3 (ref 140–450)
PLATELET # BLD AUTO: 117 10*3/MM3 (ref 140–450)
PLATELET # BLD AUTO: 169 10*3/MM3 (ref 140–450)
PLATELET # BLD AUTO: 174 10*3/MM3 (ref 140–450)
PLATELET # BLD AUTO: 182 10*3/MM3 (ref 140–450)
PLATELET # BLD AUTO: 183 10*3/MM3 (ref 140–450)
PLATELET # BLD AUTO: 205 10*3/MM3 (ref 140–450)
PLATELET # BLD AUTO: 208 10*3/MM3 (ref 140–450)
PLATELET # BLD AUTO: 217 10*3/MM3 (ref 140–450)
PLATELET # BLD AUTO: 226 10*3/MM3 (ref 140–450)
PLATELET # BLD AUTO: 237 10*3/MM3 (ref 140–450)
PLATELET # BLD AUTO: 89 10*3/MM3 (ref 140–450)
PLATELET # BLD AUTO: 92 10*3/MM3 (ref 140–450)
PLATELET # BLD AUTO: 92 10*3/MM3 (ref 140–450)
PLATELET # BLD AUTO: 99 10*3/MM3 (ref 140–450)
PMV BLD AUTO: 10.1 FL (ref 6–12)
PMV BLD AUTO: 10.1 FL (ref 6–12)
PMV BLD AUTO: 10.2 FL (ref 6–12)
PMV BLD AUTO: 10.3 FL (ref 6–12)
PMV BLD AUTO: 10.5 FL (ref 6–12)
PMV BLD AUTO: 10.6 FL (ref 6–12)
PMV BLD AUTO: 10.6 FL (ref 6–12)
PMV BLD AUTO: 10.7 FL (ref 6–12)
PMV BLD AUTO: 10.7 FL (ref 6–12)
PMV BLD AUTO: 11 FL (ref 6–12)
PMV BLD AUTO: 9 FL (ref 6–12)
PMV BLD AUTO: 9.2 FL (ref 6–12)
PMV BLD AUTO: 9.4 FL (ref 6–12)
PMV BLD AUTO: 9.6 FL (ref 6–12)
PMV BLD AUTO: 9.8 FL (ref 6–12)
PMV BLD AUTO: 9.9 FL (ref 6–12)
POTASSIUM SERPL-SCNC: 3.3 MMOL/L (ref 3.5–5.2)
POTASSIUM SERPL-SCNC: 3.4 MMOL/L (ref 3.5–5.2)
POTASSIUM SERPL-SCNC: 3.5 MMOL/L (ref 3.5–5.2)
POTASSIUM SERPL-SCNC: 3.6 MMOL/L (ref 3.5–5.2)
POTASSIUM SERPL-SCNC: 3.6 MMOL/L (ref 3.5–5.2)
POTASSIUM SERPL-SCNC: 3.8 MMOL/L (ref 3.5–5.2)
POTASSIUM SERPL-SCNC: 3.8 MMOL/L (ref 3.5–5.2)
POTASSIUM SERPL-SCNC: 3.9 MMOL/L (ref 3.5–5.2)
POTASSIUM SERPL-SCNC: 3.9 MMOL/L (ref 3.5–5.2)
POTASSIUM SERPL-SCNC: 4 MMOL/L (ref 3.5–5.2)
POTASSIUM SERPL-SCNC: 4.2 MMOL/L (ref 3.5–5.2)
POTASSIUM SERPL-SCNC: 4.2 MMOL/L (ref 3.5–5.2)
POTASSIUM SERPL-SCNC: 4.3 MMOL/L (ref 3.5–5.2)
POTASSIUM SERPL-SCNC: 4.4 MMOL/L (ref 3.5–5.2)
PREALB SERPL-MCNC: 14.4 MG/DL (ref 20–40)
PROCALCITONIN SERPL-MCNC: 0.11 NG/ML (ref 0–0.25)
PROCALCITONIN SERPL-MCNC: 0.16 NG/ML (ref 0–0.25)
PROT SERPL-MCNC: 5.5 G/DL (ref 6–8.5)
PROT SERPL-MCNC: 5.8 G/DL (ref 6–8.5)
PROT SERPL-MCNC: 6 G/DL (ref 6–8.5)
PROT SERPL-MCNC: 6.1 G/DL (ref 6–8.5)
PROT SERPL-MCNC: 6.2 G/DL (ref 6–8.5)
PROT SERPL-MCNC: 6.4 G/DL (ref 6–8.5)
PROT SERPL-MCNC: 6.6 G/DL (ref 6–8.5)
PROT SERPL-MCNC: 6.7 G/DL (ref 6–8.5)
PROT UR QL STRIP: ABNORMAL
PROTHROMBIN TIME: 13.6 SECONDS (ref 11.9–14.6)
PROTHROMBIN TIME: 15 SECONDS (ref 11.9–14.6)
PROTHROMBIN TIME: 15.3 SECONDS (ref 11.9–14.6)
PROTHROMBIN TIME: 15.8 SECONDS (ref 11.9–14.6)
PROTHROMBIN TIME: 17 SECONDS (ref 11.9–14.6)
QT INTERVAL: 324 MS
QT INTERVAL: 356 MS
QT INTERVAL: 358 MS
QT INTERVAL: 368 MS
QT INTERVAL: 370 MS
QT INTERVAL: 376 MS
QTC INTERVAL: 432 MS
QTC INTERVAL: 432 MS
QTC INTERVAL: 461 MS
QTC INTERVAL: 466 MS
QTC INTERVAL: 467 MS
QTC INTERVAL: 496 MS
RBC # BLD AUTO: 3.22 10*6/MM3 (ref 4.14–5.8)
RBC # BLD AUTO: 3.24 10*6/MM3 (ref 4.14–5.8)
RBC # BLD AUTO: 3.29 10*6/MM3 (ref 4.14–5.8)
RBC # BLD AUTO: 3.34 10*6/MM3 (ref 4.14–5.8)
RBC # BLD AUTO: 3.66 10*6/MM3 (ref 4.14–5.8)
RBC # BLD AUTO: 3.94 10*6/MM3 (ref 4.14–5.8)
RBC # BLD AUTO: 4.18 10*6/MM3 (ref 4.14–5.8)
RBC # BLD AUTO: 4.2 10*6/MM3 (ref 4.14–5.8)
RBC # BLD AUTO: 4.44 10*6/MM3 (ref 4.14–5.8)
RBC # BLD AUTO: 4.69 10*6/MM3 (ref 4.14–5.8)
RBC # BLD AUTO: 4.73 10*6/MM3 (ref 4.14–5.8)
RBC # BLD AUTO: 4.78 10*6/MM3 (ref 4.14–5.8)
RBC # BLD AUTO: 4.82 10*6/MM3 (ref 4.14–5.8)
RBC # BLD AUTO: 4.95 10*6/MM3 (ref 4.14–5.8)
RBC # BLD AUTO: 4.97 10*6/MM3 (ref 4.14–5.8)
RBC # BLD AUTO: 5.3 10*6/MM3 (ref 4.14–5.8)
RBC # UR: ABNORMAL /HPF
REF LAB TEST METHOD: ABNORMAL
RENAL EPI CELLS #/AREA URNS HPF: ABNORMAL /HPF
SARS-COV-2 RDRP RESP QL NAA+PROBE: NORMAL
SARS-COV-2 RNA PNL SPEC NAA+PROBE: NOT DETECTED
SODIUM SERPL-SCNC: 137 MMOL/L (ref 136–145)
SODIUM SERPL-SCNC: 138 MMOL/L (ref 136–145)
SODIUM SERPL-SCNC: 139 MMOL/L (ref 136–145)
SODIUM SERPL-SCNC: 140 MMOL/L (ref 136–145)
SODIUM SERPL-SCNC: 141 MMOL/L (ref 136–145)
SODIUM SERPL-SCNC: 143 MMOL/L (ref 136–145)
SODIUM SERPL-SCNC: 143 MMOL/L (ref 136–145)
SODIUM SERPL-SCNC: 145 MMOL/L (ref 136–145)
SODIUM SERPL-SCNC: 146 MMOL/L (ref 136–145)
SODIUM SERPL-SCNC: 148 MMOL/L (ref 136–145)
SODIUM SERPL-SCNC: 149 MMOL/L (ref 136–145)
SODIUM SERPL-SCNC: 149 MMOL/L (ref 136–145)
SODIUM SERPL-SCNC: 150 MMOL/L (ref 136–145)
SODIUM SERPL-SCNC: 151 MMOL/L (ref 136–145)
SODIUM SERPL-SCNC: 152 MMOL/L (ref 136–145)
SP GR UR STRIP: 1.02 (ref 1–1.03)
SP GR UR STRIP: 1.02 (ref 1–1.03)
SP GR UR STRIP: 1.03 (ref 1–1.03)
SP GR UR STRIP: 1.03 (ref 1–1.03)
SP GR UR STRIP: >1.03 (ref 1–1.03)
SQUAMOUS #/AREA URNS HPF: ABNORMAL /HPF
STRESS TARGET HR: 121 BPM
TRIGL SERPL-MCNC: 127 MG/DL (ref 0–150)
TROPONIN T SERPL-MCNC: 0.01 NG/ML (ref 0–0.03)
TROPONIN T SERPL-MCNC: 0.02 NG/ML (ref 0–0.03)
TROPONIN T SERPL-MCNC: <0.01 NG/ML (ref 0–0.03)
TSH SERPL DL<=0.05 MIU/L-ACNC: 0.86 UIU/ML (ref 0.27–4.2)
TSH SERPL DL<=0.05 MIU/L-ACNC: 1.31 UIU/ML (ref 0.27–4.2)
UNIDENT CRYS URNS QL MICRO: ABNORMAL /HPF
UROBILINOGEN UR QL STRIP: ABNORMAL
VIT B12 BLD-MCNC: 424 PG/ML (ref 211–946)
VLDLC SERPL-MCNC: 23 MG/DL (ref 5–40)
WBC # BLD AUTO: 10.24 10*3/MM3 (ref 3.4–10.8)
WBC # BLD AUTO: 13.06 10*3/MM3 (ref 3.4–10.8)
WBC # BLD AUTO: 5.1 10*3/MM3 (ref 3.4–10.8)
WBC # BLD AUTO: 5.3 10*3/MM3 (ref 3.4–10.8)
WBC # BLD AUTO: 6.02 10*3/MM3 (ref 3.4–10.8)
WBC # BLD AUTO: 6.2 10*3/MM3 (ref 3.4–10.8)
WBC # BLD AUTO: 6.53 10*3/MM3 (ref 3.4–10.8)
WBC # BLD AUTO: 6.59 10*3/MM3 (ref 3.4–10.8)
WBC # BLD AUTO: 6.77 10*3/MM3 (ref 3.4–10.8)
WBC # BLD AUTO: 7.48 10*3/MM3 (ref 3.4–10.8)
WBC # BLD AUTO: 7.69 10*3/MM3 (ref 3.4–10.8)
WBC # BLD AUTO: 8.3 10*3/MM3 (ref 3.4–10.8)
WBC # BLD AUTO: 8.87 10*3/MM3 (ref 3.4–10.8)
WBC # BLD AUTO: 9.26 10*3/MM3 (ref 3.4–10.8)
WBC # BLD AUTO: 9.49 10*3/MM3 (ref 3.4–10.8)
WBC # BLD AUTO: 9.68 10*3/MM3 (ref 3.4–10.8)
WBC UR QL AUTO: ABNORMAL /HPF
WHOLE BLOOD HOLD SPECIMEN: NORMAL

## 2021-01-01 PROCEDURE — 80053 COMPREHEN METABOLIC PANEL: CPT | Performed by: FAMILY MEDICINE

## 2021-01-01 PROCEDURE — 63710000001 INSULIN LISPRO (HUMAN) PER 5 UNITS: Performed by: INTERNAL MEDICINE

## 2021-01-01 PROCEDURE — 99231 SBSQ HOSP IP/OBS SF/LOW 25: CPT | Performed by: NURSE PRACTITIONER

## 2021-01-01 PROCEDURE — 85025 COMPLETE CBC W/AUTO DIFF WBC: CPT | Performed by: FAMILY MEDICINE

## 2021-01-01 PROCEDURE — 4A023N6 MEASUREMENT OF CARDIAC SAMPLING AND PRESSURE, RIGHT HEART, PERCUTANEOUS APPROACH: ICD-10-PCS | Performed by: INTERNAL MEDICINE

## 2021-01-01 PROCEDURE — 85610 PROTHROMBIN TIME: CPT | Performed by: INTERNAL MEDICINE

## 2021-01-01 PROCEDURE — 80048 BASIC METABOLIC PNL TOTAL CA: CPT | Performed by: INTERNAL MEDICINE

## 2021-01-01 PROCEDURE — 87040 BLOOD CULTURE FOR BACTERIA: CPT | Performed by: EMERGENCY MEDICINE

## 2021-01-01 PROCEDURE — 83605 ASSAY OF LACTIC ACID: CPT | Performed by: EMERGENCY MEDICINE

## 2021-01-01 PROCEDURE — 97110 THERAPEUTIC EXERCISES: CPT

## 2021-01-01 PROCEDURE — 93010 ELECTROCARDIOGRAM REPORT: CPT | Performed by: INTERNAL MEDICINE

## 2021-01-01 PROCEDURE — G0378 HOSPITAL OBSERVATION PER HR: HCPCS

## 2021-01-01 PROCEDURE — 84484 ASSAY OF TROPONIN QUANT: CPT | Performed by: EMERGENCY MEDICINE

## 2021-01-01 PROCEDURE — 71045 X-RAY EXAM CHEST 1 VIEW: CPT

## 2021-01-01 PROCEDURE — 85610 PROTHROMBIN TIME: CPT | Performed by: PHYSICIAN ASSISTANT

## 2021-01-01 PROCEDURE — 99223 1ST HOSP IP/OBS HIGH 75: CPT | Performed by: INTERNAL MEDICINE

## 2021-01-01 PROCEDURE — 0 IOPAMIDOL PER 1 ML: Performed by: FAMILY MEDICINE

## 2021-01-01 PROCEDURE — 36415 COLL VENOUS BLD VENIPUNCTURE: CPT | Performed by: FAMILY MEDICINE

## 2021-01-01 PROCEDURE — 85027 COMPLETE CBC AUTOMATED: CPT | Performed by: INTERNAL MEDICINE

## 2021-01-01 PROCEDURE — 83690 ASSAY OF LIPASE: CPT | Performed by: INTERNAL MEDICINE

## 2021-01-01 PROCEDURE — 25010000002 IOPAMIDOL 61 % SOLUTION: Performed by: INTERNAL MEDICINE

## 2021-01-01 PROCEDURE — 85610 PROTHROMBIN TIME: CPT | Performed by: FAMILY MEDICINE

## 2021-01-01 PROCEDURE — 87086 URINE CULTURE/COLONY COUNT: CPT | Performed by: PHYSICIAN ASSISTANT

## 2021-01-01 PROCEDURE — 25010000002 CEFTRIAXONE PER 250 MG: Performed by: FAMILY MEDICINE

## 2021-01-01 PROCEDURE — 25010000002 ONDANSETRON PER 1 MG: Performed by: INTERNAL MEDICINE

## 2021-01-01 PROCEDURE — 97530 THERAPEUTIC ACTIVITIES: CPT

## 2021-01-01 PROCEDURE — 97535 SELF CARE MNGMENT TRAINING: CPT

## 2021-01-01 PROCEDURE — 87088 URINE BACTERIA CULTURE: CPT | Performed by: INTERNAL MEDICINE

## 2021-01-01 PROCEDURE — 93005 ELECTROCARDIOGRAM TRACING: CPT | Performed by: EMERGENCY MEDICINE

## 2021-01-01 PROCEDURE — 72131 CT LUMBAR SPINE W/O DYE: CPT

## 2021-01-01 PROCEDURE — 36014 PLACE CATHETER IN ARTERY: CPT | Performed by: INTERNAL MEDICINE

## 2021-01-01 PROCEDURE — 85384 FIBRINOGEN ACTIVITY: CPT | Performed by: INTERNAL MEDICINE

## 2021-01-01 PROCEDURE — 85025 COMPLETE CBC W/AUTO DIFF WBC: CPT | Performed by: EMERGENCY MEDICINE

## 2021-01-01 PROCEDURE — 25010000002 DIPHENHYDRAMINE PER 50 MG: Performed by: INTERNAL MEDICINE

## 2021-01-01 PROCEDURE — 25010000002 MORPHINE SULFATE (PF) 2 MG/ML SOLUTION: Performed by: INTERNAL MEDICINE

## 2021-01-01 PROCEDURE — 82962 GLUCOSE BLOOD TEST: CPT

## 2021-01-01 PROCEDURE — 85730 THROMBOPLASTIN TIME PARTIAL: CPT | Performed by: INTERNAL MEDICINE

## 2021-01-01 PROCEDURE — 63710000001 INSULIN REGULAR HUMAN PER 5 UNITS: Performed by: FAMILY MEDICINE

## 2021-01-01 PROCEDURE — 81001 URINALYSIS AUTO W/SCOPE: CPT | Performed by: EMERGENCY MEDICINE

## 2021-01-01 PROCEDURE — 99239 HOSP IP/OBS DSCHRG MGMT >30: CPT | Performed by: INTERNAL MEDICINE

## 2021-01-01 PROCEDURE — 99152 MOD SED SAME PHYS/QHP 5/>YRS: CPT | Performed by: INTERNAL MEDICINE

## 2021-01-01 PROCEDURE — 99285 EMERGENCY DEPT VISIT HI MDM: CPT

## 2021-01-01 PROCEDURE — 80053 COMPREHEN METABOLIC PANEL: CPT | Performed by: EMERGENCY MEDICINE

## 2021-01-01 PROCEDURE — 25010000002 CEFTRIAXONE PER 250 MG: Performed by: INTERNAL MEDICINE

## 2021-01-01 PROCEDURE — 25010000002 HEPARIN (PORCINE): Performed by: INTERNAL MEDICINE

## 2021-01-01 PROCEDURE — 80048 BASIC METABOLIC PNL TOTAL CA: CPT | Performed by: FAMILY MEDICINE

## 2021-01-01 PROCEDURE — 93970 EXTREMITY STUDY: CPT | Performed by: SURGERY

## 2021-01-01 PROCEDURE — 99233 SBSQ HOSP IP/OBS HIGH 50: CPT | Performed by: INTERNAL MEDICINE

## 2021-01-01 PROCEDURE — 74177 CT ABD & PELVIS W/CONTRAST: CPT

## 2021-01-01 PROCEDURE — 25010000002 ZIPRASIDONE MESYLATE PER 10 MG: Performed by: FAMILY MEDICINE

## 2021-01-01 PROCEDURE — L1830 KO IMMOB CANVAS LONG PRE OTS: HCPCS | Performed by: INTERNAL MEDICINE

## 2021-01-01 PROCEDURE — 93005 ELECTROCARDIOGRAM TRACING: CPT | Performed by: INTERNAL MEDICINE

## 2021-01-01 PROCEDURE — 25010000002 CEFTRIAXONE PER 250 MG: Performed by: EMERGENCY MEDICINE

## 2021-01-01 PROCEDURE — 85610 PROTHROMBIN TIME: CPT | Performed by: EMERGENCY MEDICINE

## 2021-01-01 PROCEDURE — 80053 COMPREHEN METABOLIC PANEL: CPT | Performed by: PHYSICIAN ASSISTANT

## 2021-01-01 PROCEDURE — 94799 UNLISTED PULMONARY SVC/PX: CPT

## 2021-01-01 PROCEDURE — 87086 URINE CULTURE/COLONY COUNT: CPT | Performed by: EMERGENCY MEDICINE

## 2021-01-01 PROCEDURE — 36415 COLL VENOUS BLD VENIPUNCTURE: CPT

## 2021-01-01 PROCEDURE — 25010000002 CEFTRIAXONE PER 250 MG: Performed by: PHYSICIAN ASSISTANT

## 2021-01-01 PROCEDURE — 70450 CT HEAD/BRAIN W/O DYE: CPT

## 2021-01-01 PROCEDURE — 72128 CT CHEST SPINE W/O DYE: CPT

## 2021-01-01 PROCEDURE — 25010000002 DIPHENHYDRAMINE PER 50 MG: Performed by: FAMILY MEDICINE

## 2021-01-01 PROCEDURE — 84443 ASSAY THYROID STIM HORMONE: CPT | Performed by: FAMILY MEDICINE

## 2021-01-01 PROCEDURE — 82607 VITAMIN B-12: CPT | Performed by: FAMILY MEDICINE

## 2021-01-01 PROCEDURE — 87635 SARS-COV-2 COVID-19 AMP PRB: CPT | Performed by: FAMILY MEDICINE

## 2021-01-01 PROCEDURE — 05H933Z INSERTION OF INFUSION DEVICE INTO RIGHT BRACHIAL VEIN, PERCUTANEOUS APPROACH: ICD-10-PCS | Performed by: FAMILY MEDICINE

## 2021-01-01 PROCEDURE — 3E0L3GC INTRODUCTION OF OTHER THERAPEUTIC SUBSTANCE INTO PLEURAL CAVITY, PERCUTANEOUS APPROACH: ICD-10-PCS | Performed by: INTERNAL MEDICINE

## 2021-01-01 PROCEDURE — 83036 HEMOGLOBIN GLYCOSYLATED A1C: CPT | Performed by: FAMILY MEDICINE

## 2021-01-01 PROCEDURE — 36415 COLL VENOUS BLD VENIPUNCTURE: CPT | Performed by: EMERGENCY MEDICINE

## 2021-01-01 PROCEDURE — 25010000002 MIDAZOLAM HCL (PF) 5 MG/5ML SOLUTION: Performed by: INTERNAL MEDICINE

## 2021-01-01 PROCEDURE — P9612 CATHETERIZE FOR URINE SPEC: HCPCS

## 2021-01-01 PROCEDURE — 25010000002 ENOXAPARIN PER 10 MG: Performed by: FAMILY MEDICINE

## 2021-01-01 PROCEDURE — 85025 COMPLETE CBC W/AUTO DIFF WBC: CPT | Performed by: INTERNAL MEDICINE

## 2021-01-01 PROCEDURE — 83880 ASSAY OF NATRIURETIC PEPTIDE: CPT | Performed by: FAMILY MEDICINE

## 2021-01-01 PROCEDURE — 84100 ASSAY OF PHOSPHORUS: CPT | Performed by: INTERNAL MEDICINE

## 2021-01-01 PROCEDURE — 25010000002 ENOXAPARIN PER 10 MG: Performed by: INTERNAL MEDICINE

## 2021-01-01 PROCEDURE — C1769 GUIDE WIRE: HCPCS | Performed by: INTERNAL MEDICINE

## 2021-01-01 PROCEDURE — 85379 FIBRIN DEGRADATION QUANT: CPT | Performed by: FAMILY MEDICINE

## 2021-01-01 PROCEDURE — 83735 ASSAY OF MAGNESIUM: CPT | Performed by: EMERGENCY MEDICINE

## 2021-01-01 PROCEDURE — 73560 X-RAY EXAM OF KNEE 1 OR 2: CPT

## 2021-01-01 PROCEDURE — 76937 US GUIDE VASCULAR ACCESS: CPT

## 2021-01-01 PROCEDURE — 25010000002 MORPHINE PER 10 MG: Performed by: EMERGENCY MEDICINE

## 2021-01-01 PROCEDURE — 81001 URINALYSIS AUTO W/SCOPE: CPT | Performed by: FAMILY MEDICINE

## 2021-01-01 PROCEDURE — 99283 EMERGENCY DEPT VISIT LOW MDM: CPT

## 2021-01-01 PROCEDURE — 84443 ASSAY THYROID STIM HORMONE: CPT | Performed by: INTERNAL MEDICINE

## 2021-01-01 PROCEDURE — 84484 ASSAY OF TROPONIN QUANT: CPT | Performed by: FAMILY MEDICINE

## 2021-01-01 PROCEDURE — 25010000002 METOCLOPRAMIDE PER 10 MG: Performed by: INTERNAL MEDICINE

## 2021-01-01 PROCEDURE — 97162 PT EVAL MOD COMPLEX 30 MIN: CPT

## 2021-01-01 PROCEDURE — 63710000001 INSULIN LISPRO (HUMAN) PER 5 UNITS: Performed by: FAMILY MEDICINE

## 2021-01-01 PROCEDURE — 84134 ASSAY OF PREALBUMIN: CPT | Performed by: FAMILY MEDICINE

## 2021-01-01 PROCEDURE — 99221 1ST HOSP IP/OBS SF/LOW 40: CPT | Performed by: NURSE PRACTITIONER

## 2021-01-01 PROCEDURE — 25010000002 VANCOMYCIN 10 G RECONSTITUTED SOLUTION: Performed by: EMERGENCY MEDICINE

## 2021-01-01 PROCEDURE — 93005 ELECTROCARDIOGRAM TRACING: CPT | Performed by: FAMILY MEDICINE

## 2021-01-01 PROCEDURE — 25010000002 PERFLUTREN 6.52 MG/ML SUSPENSION: Performed by: INTERNAL MEDICINE

## 2021-01-01 PROCEDURE — 92526 ORAL FUNCTION THERAPY: CPT

## 2021-01-01 PROCEDURE — 51702 INSERT TEMP BLADDER CATH: CPT

## 2021-01-01 PROCEDURE — 99203 OFFICE O/P NEW LOW 30 MIN: CPT | Performed by: PHYSICIAN ASSISTANT

## 2021-01-01 PROCEDURE — 85025 COMPLETE CBC W/AUTO DIFF WBC: CPT | Performed by: PHYSICIAN ASSISTANT

## 2021-01-01 PROCEDURE — 36415 COLL VENOUS BLD VENIPUNCTURE: CPT | Performed by: INTERNAL MEDICINE

## 2021-01-01 PROCEDURE — 99153 MOD SED SAME PHYS/QHP EA: CPT | Performed by: INTERNAL MEDICINE

## 2021-01-01 PROCEDURE — 99233 SBSQ HOSP IP/OBS HIGH 50: CPT | Performed by: CLINICAL NURSE SPECIALIST

## 2021-01-01 PROCEDURE — 97116 GAIT TRAINING THERAPY: CPT

## 2021-01-01 PROCEDURE — 81001 URINALYSIS AUTO W/SCOPE: CPT | Performed by: INTERNAL MEDICINE

## 2021-01-01 PROCEDURE — 80053 COMPREHEN METABOLIC PANEL: CPT | Performed by: INTERNAL MEDICINE

## 2021-01-01 PROCEDURE — 87635 SARS-COV-2 COVID-19 AMP PRB: CPT | Performed by: PHYSICIAN ASSISTANT

## 2021-01-01 PROCEDURE — 92610 EVALUATE SWALLOWING FUNCTION: CPT

## 2021-01-01 PROCEDURE — 93970 EXTREMITY STUDY: CPT

## 2021-01-01 PROCEDURE — 70551 MRI BRAIN STEM W/O DYE: CPT

## 2021-01-01 PROCEDURE — 97166 OT EVAL MOD COMPLEX 45 MIN: CPT | Performed by: OCCUPATIONAL THERAPIST

## 2021-01-01 PROCEDURE — 81003 URINALYSIS AUTO W/O SCOPE: CPT | Performed by: EMERGENCY MEDICINE

## 2021-01-01 PROCEDURE — 96374 THER/PROPH/DIAG INJ IV PUSH: CPT

## 2021-01-01 PROCEDURE — 81001 URINALYSIS AUTO W/SCOPE: CPT | Performed by: PHYSICIAN ASSISTANT

## 2021-01-01 PROCEDURE — 96372 THER/PROPH/DIAG INJ SC/IM: CPT

## 2021-01-01 PROCEDURE — 83036 HEMOGLOBIN GLYCOSYLATED A1C: CPT | Performed by: INTERNAL MEDICINE

## 2021-01-01 PROCEDURE — 84145 PROCALCITONIN (PCT): CPT | Performed by: EMERGENCY MEDICINE

## 2021-01-01 PROCEDURE — 99232 SBSQ HOSP IP/OBS MODERATE 35: CPT | Performed by: NURSE PRACTITIONER

## 2021-01-01 PROCEDURE — 85027 COMPLETE CBC AUTOMATED: CPT | Performed by: NURSE PRACTITIONER

## 2021-01-01 PROCEDURE — 85730 THROMBOPLASTIN TIME PARTIAL: CPT | Performed by: FAMILY MEDICINE

## 2021-01-01 PROCEDURE — 93000 ELECTROCARDIOGRAM COMPLETE: CPT | Performed by: INTERNAL MEDICINE

## 2021-01-01 PROCEDURE — 86140 C-REACTIVE PROTEIN: CPT | Performed by: EMERGENCY MEDICINE

## 2021-01-01 PROCEDURE — B31T1ZZ FLUOROSCOPY OF LEFT PULMONARY ARTERY USING LOW OSMOLAR CONTRAST: ICD-10-PCS | Performed by: INTERNAL MEDICINE

## 2021-01-01 PROCEDURE — 25010000002 ALTEPLASE 2 MG RECONSTITUTED SOLUTION 1 EACH VIAL: Performed by: INTERNAL MEDICINE

## 2021-01-01 PROCEDURE — 72148 MRI LUMBAR SPINE W/O DYE: CPT

## 2021-01-01 PROCEDURE — 80061 LIPID PANEL: CPT | Performed by: FAMILY MEDICINE

## 2021-01-01 PROCEDURE — 83735 ASSAY OF MAGNESIUM: CPT | Performed by: INTERNAL MEDICINE

## 2021-01-01 PROCEDURE — 71275 CT ANGIOGRAPHY CHEST: CPT

## 2021-01-01 PROCEDURE — 63710000001 ONDANSETRON ODT 4 MG TABLET DISPERSIBLE: Performed by: CLINICAL NURSE SPECIALIST

## 2021-01-01 PROCEDURE — C1751 CATH, INF, PER/CENT/MIDLINE: HCPCS | Performed by: INTERNAL MEDICINE

## 2021-01-01 PROCEDURE — 84295 ASSAY OF SERUM SODIUM: CPT | Performed by: INTERNAL MEDICINE

## 2021-01-01 PROCEDURE — 82550 ASSAY OF CK (CPK): CPT | Performed by: PHYSICIAN ASSISTANT

## 2021-01-01 PROCEDURE — 93010 ELECTROCARDIOGRAM REPORT: CPT | Performed by: EMERGENCY MEDICINE

## 2021-01-01 PROCEDURE — 87635 SARS-COV-2 COVID-19 AMP PRB: CPT | Performed by: EMERGENCY MEDICINE

## 2021-01-01 PROCEDURE — 25010000002 FENTANYL CITRATE (PF) 100 MCG/2ML SOLUTION: Performed by: INTERNAL MEDICINE

## 2021-01-01 PROCEDURE — 99222 1ST HOSP IP/OBS MODERATE 55: CPT | Performed by: NEUROLOGICAL SURGERY

## 2021-01-01 PROCEDURE — 25010000002 ONDANSETRON PER 1 MG: Performed by: EMERGENCY MEDICINE

## 2021-01-01 PROCEDURE — 99222 1ST HOSP IP/OBS MODERATE 55: CPT | Performed by: CLINICAL NURSE SPECIALIST

## 2021-01-01 PROCEDURE — 87086 URINE CULTURE/COLONY COUNT: CPT | Performed by: INTERNAL MEDICINE

## 2021-01-01 PROCEDURE — 25010000002 LORAZEPAM PER 2 MG: Performed by: FAMILY MEDICINE

## 2021-01-01 PROCEDURE — B31S1ZZ FLUOROSCOPY OF RIGHT PULMONARY ARTERY USING LOW OSMOLAR CONTRAST: ICD-10-PCS | Performed by: INTERNAL MEDICINE

## 2021-01-01 PROCEDURE — 87186 SC STD MICRODIL/AGAR DIL: CPT | Performed by: INTERNAL MEDICINE

## 2021-01-01 PROCEDURE — C1894 INTRO/SHEATH, NON-LASER: HCPCS | Performed by: INTERNAL MEDICINE

## 2021-01-01 PROCEDURE — 83605 ASSAY OF LACTIC ACID: CPT | Performed by: PHYSICIAN ASSISTANT

## 2021-01-01 PROCEDURE — 80048 BASIC METABOLIC PNL TOTAL CA: CPT | Performed by: NURSE PRACTITIONER

## 2021-01-01 PROCEDURE — 93306 TTE W/DOPPLER COMPLETE: CPT | Performed by: INTERNAL MEDICINE

## 2021-01-01 PROCEDURE — 99222 1ST HOSP IP/OBS MODERATE 55: CPT | Performed by: INTERNAL MEDICINE

## 2021-01-01 PROCEDURE — 83880 ASSAY OF NATRIURETIC PEPTIDE: CPT | Performed by: EMERGENCY MEDICINE

## 2021-01-01 PROCEDURE — 82306 VITAMIN D 25 HYDROXY: CPT | Performed by: FAMILY MEDICINE

## 2021-01-01 PROCEDURE — 37214 CESSJ THERAPY CATH REMOVAL: CPT | Performed by: INTERNAL MEDICINE

## 2021-01-01 PROCEDURE — 70150 X-RAY EXAM OF FACIAL BONES: CPT

## 2021-01-01 PROCEDURE — 99232 SBSQ HOSP IP/OBS MODERATE 35: CPT | Performed by: CLINICAL NURSE SPECIALIST

## 2021-01-01 PROCEDURE — 76937 US GUIDE VASCULAR ACCESS: CPT | Performed by: INTERNAL MEDICINE

## 2021-01-01 PROCEDURE — 84145 PROCALCITONIN (PCT): CPT | Performed by: PHYSICIAN ASSISTANT

## 2021-01-01 PROCEDURE — 37212 THROMBOLYTIC VENOUS THERAPY: CPT | Performed by: INTERNAL MEDICINE

## 2021-01-01 PROCEDURE — 96375 TX/PRO/DX INJ NEW DRUG ADDON: CPT

## 2021-01-01 PROCEDURE — 99214 OFFICE O/P EST MOD 30 MIN: CPT | Performed by: INTERNAL MEDICINE

## 2021-01-01 PROCEDURE — 25010000003 LIDOCAINE 1 % SOLUTION 20 ML VIAL: Performed by: INTERNAL MEDICINE

## 2021-01-01 PROCEDURE — 72125 CT NECK SPINE W/O DYE: CPT

## 2021-01-01 PROCEDURE — 85730 THROMBOPLASTIN TIME PARTIAL: CPT | Performed by: PHYSICIAN ASSISTANT

## 2021-01-01 PROCEDURE — 73080 X-RAY EXAM OF ELBOW: CPT

## 2021-01-01 PROCEDURE — 87040 BLOOD CULTURE FOR BACTERIA: CPT | Performed by: PHYSICIAN ASSISTANT

## 2021-01-01 PROCEDURE — 80076 HEPATIC FUNCTION PANEL: CPT | Performed by: FAMILY MEDICINE

## 2021-01-01 PROCEDURE — 93306 TTE W/DOPPLER COMPLETE: CPT

## 2021-01-01 PROCEDURE — 83880 ASSAY OF NATRIURETIC PEPTIDE: CPT | Performed by: PHYSICIAN ASSISTANT

## 2021-01-01 RX ORDER — CEFDINIR 300 MG/1
300 CAPSULE ORAL EVERY 12 HOURS SCHEDULED
Status: DISCONTINUED | OUTPATIENT
Start: 2021-01-01 | End: 2021-01-01 | Stop reason: HOSPADM

## 2021-01-01 RX ORDER — SACCHAROMYCES BOULARDII 250 MG
250 CAPSULE ORAL 2 TIMES DAILY
Status: DISCONTINUED | OUTPATIENT
Start: 2021-01-01 | End: 2021-01-01 | Stop reason: HOSPADM

## 2021-01-01 RX ORDER — ALLOPURINOL 300 MG/1
300 TABLET ORAL DAILY
Status: DISCONTINUED | OUTPATIENT
Start: 2021-01-01 | End: 2021-01-01

## 2021-01-01 RX ORDER — METOPROLOL SUCCINATE 50 MG/1
50 TABLET, EXTENDED RELEASE ORAL DAILY
COMMUNITY
End: 2021-01-01

## 2021-01-01 RX ORDER — ATROPA BELLADONNA AND OPIUM 16.2; 6 MG/1; MG/1
60 SUPPOSITORY RECTAL EVERY 8 HOURS PRN
Qty: 4 EACH | Refills: 0 | Status: SHIPPED | OUTPATIENT
Start: 2021-01-01 | End: 2021-01-01

## 2021-01-01 RX ORDER — PANTOPRAZOLE SODIUM 40 MG/1
40 TABLET, DELAYED RELEASE ORAL
Qty: 60 TABLET | Refills: 0 | Status: SHIPPED | OUTPATIENT
Start: 2021-01-01

## 2021-01-01 RX ORDER — BISOPROLOL FUMARATE 10 MG/1
10 TABLET, FILM COATED ORAL DAILY
Status: DISCONTINUED | OUTPATIENT
Start: 2021-01-01 | End: 2021-01-01 | Stop reason: HOSPADM

## 2021-01-01 RX ORDER — POLYETHYLENE GLYCOL 3350 17 G/17G
17 POWDER, FOR SOLUTION ORAL DAILY
Start: 2021-01-01 | End: 2021-01-01 | Stop reason: HOSPADM

## 2021-01-01 RX ORDER — SODIUM CHLORIDE 0.9 % (FLUSH) 0.9 %
10 SYRINGE (ML) INJECTION AS NEEDED
Status: DISCONTINUED | OUTPATIENT
Start: 2021-01-01 | End: 2021-01-01 | Stop reason: HOSPADM

## 2021-01-01 RX ORDER — DOCUSATE SODIUM 100 MG/1
100 CAPSULE, LIQUID FILLED ORAL 2 TIMES DAILY
Status: DISCONTINUED | OUTPATIENT
Start: 2021-01-01 | End: 2021-01-01 | Stop reason: HOSPADM

## 2021-01-01 RX ORDER — SODIUM CHLORIDE 9 MG/ML
100 INJECTION, SOLUTION INTRAVENOUS CONTINUOUS
Status: DISCONTINUED | OUTPATIENT
Start: 2021-01-01 | End: 2021-01-01

## 2021-01-01 RX ORDER — CYCLOBENZAPRINE HCL 10 MG
10 TABLET ORAL 3 TIMES DAILY PRN
Qty: 15 TABLET | Refills: 0 | Status: SHIPPED | OUTPATIENT
Start: 2021-01-01 | End: 2021-01-01 | Stop reason: HOSPADM

## 2021-01-01 RX ORDER — ATROPA BELLADONNA AND OPIUM 16.2; 6 MG/1; MG/1
60 SUPPOSITORY RECTAL EVERY 8 HOURS PRN
Status: DISCONTINUED | OUTPATIENT
Start: 2021-01-01 | End: 2021-01-01 | Stop reason: HOSPADM

## 2021-01-01 RX ORDER — NIFEDIPINE 30 MG/1
30 TABLET, EXTENDED RELEASE ORAL 2 TIMES DAILY
COMMUNITY
End: 2021-01-01

## 2021-01-01 RX ORDER — BUPROPION HYDROCHLORIDE 300 MG/1
300 TABLET ORAL DAILY
Start: 2021-01-01 | End: 2021-01-01 | Stop reason: HOSPADM

## 2021-01-01 RX ORDER — ACETAMINOPHEN 325 MG/1
650 TABLET ORAL EVERY 6 HOURS PRN
Status: DISCONTINUED | OUTPATIENT
Start: 2021-01-01 | End: 2021-01-01 | Stop reason: HOSPADM

## 2021-01-01 RX ORDER — LOSARTAN POTASSIUM 50 MG/1
100 TABLET ORAL
Status: DISCONTINUED | OUTPATIENT
Start: 2021-01-01 | End: 2021-01-01 | Stop reason: HOSPADM

## 2021-01-01 RX ORDER — HALOPERIDOL 2 MG/ML
2 SOLUTION ORAL EVERY 4 HOURS PRN
Status: DISCONTINUED | OUTPATIENT
Start: 2021-01-01 | End: 2021-01-01 | Stop reason: HOSPADM

## 2021-01-01 RX ORDER — TERAZOSIN 5 MG/1
5 CAPSULE ORAL EVERY 12 HOURS SCHEDULED
Status: DISCONTINUED | OUTPATIENT
Start: 2021-01-01 | End: 2021-01-01 | Stop reason: HOSPADM

## 2021-01-01 RX ORDER — ONDANSETRON 4 MG/1
4 TABLET, FILM COATED ORAL EVERY 6 HOURS PRN
Status: DISCONTINUED | OUTPATIENT
Start: 2021-01-01 | End: 2021-01-01 | Stop reason: HOSPADM

## 2021-01-01 RX ORDER — DOXEPIN HYDROCHLORIDE 10 MG/1
10 CAPSULE ORAL DAILY
Status: DISCONTINUED | OUTPATIENT
Start: 2021-01-01 | End: 2021-01-01 | Stop reason: HOSPADM

## 2021-01-01 RX ORDER — METFORMIN HYDROCHLORIDE 500 MG/1
500 TABLET, EXTENDED RELEASE ORAL 2 TIMES DAILY
Start: 2021-01-01 | End: 2021-01-01 | Stop reason: HOSPADM

## 2021-01-01 RX ORDER — CEFDINIR 300 MG/1
300 CAPSULE ORAL 2 TIMES DAILY
Qty: 10 CAPSULE | Refills: 0 | Status: SHIPPED | OUTPATIENT
Start: 2021-01-01 | End: 2021-01-01 | Stop reason: HOSPADM

## 2021-01-01 RX ORDER — TAMSULOSIN HYDROCHLORIDE 0.4 MG/1
0.4 CAPSULE ORAL DAILY
Status: DISCONTINUED | OUTPATIENT
Start: 2021-01-01 | End: 2021-01-01 | Stop reason: HOSPADM

## 2021-01-01 RX ORDER — ALLOPURINOL 300 MG/1
300 TABLET ORAL DAILY
Status: DISCONTINUED | OUTPATIENT
Start: 2021-01-01 | End: 2021-01-01 | Stop reason: HOSPADM

## 2021-01-01 RX ORDER — DEXTROSE MONOHYDRATE 25 G/50ML
25 INJECTION, SOLUTION INTRAVENOUS
Status: DISCONTINUED | OUTPATIENT
Start: 2021-01-01 | End: 2021-01-01 | Stop reason: HOSPADM

## 2021-01-01 RX ORDER — IRBESARTAN 300 MG/1
150 TABLET ORAL DAILY
COMMUNITY
End: 2021-01-01 | Stop reason: HOSPADM

## 2021-01-01 RX ORDER — BUPROPION HYDROCHLORIDE 150 MG/1
300 TABLET ORAL DAILY
Status: DISCONTINUED | OUTPATIENT
Start: 2021-01-01 | End: 2021-01-01 | Stop reason: HOSPADM

## 2021-01-01 RX ORDER — PENTOXIFYLLINE 400 MG/1
400 TABLET, EXTENDED RELEASE ORAL 2 TIMES DAILY WITH MEALS
Status: DISCONTINUED | OUTPATIENT
Start: 2021-01-01 | End: 2021-01-01 | Stop reason: HOSPADM

## 2021-01-01 RX ORDER — ACETAMINOPHEN 325 MG/1
650 TABLET ORAL EVERY 4 HOURS PRN
Status: DISCONTINUED | OUTPATIENT
Start: 2021-01-01 | End: 2021-01-01 | Stop reason: HOSPADM

## 2021-01-01 RX ORDER — RISPERIDONE 1 MG/ML
0.25 SOLUTION ORAL EVERY 12 HOURS SCHEDULED
Qty: 15 ML | Refills: 0 | Status: SHIPPED | OUTPATIENT
Start: 2021-01-01

## 2021-01-01 RX ORDER — AMLODIPINE BESYLATE 10 MG/1
10 TABLET ORAL DAILY
Status: ON HOLD | COMMUNITY
End: 2021-01-01 | Stop reason: SDUPTHER

## 2021-01-01 RX ORDER — BISOPROLOL FUMARATE 10 MG/1
10 TABLET, FILM COATED ORAL
Status: DISCONTINUED | OUTPATIENT
Start: 2021-01-01 | End: 2021-01-01 | Stop reason: HOSPADM

## 2021-01-01 RX ORDER — TEMAZEPAM 7.5 MG/1
7.5 CAPSULE ORAL NIGHTLY PRN
Status: DISCONTINUED | OUTPATIENT
Start: 2021-01-01 | End: 2021-01-01 | Stop reason: HOSPADM

## 2021-01-01 RX ORDER — DEXTROSE MONOHYDRATE 25 G/50ML
25 INJECTION, SOLUTION INTRAVENOUS
Status: DISCONTINUED | OUTPATIENT
Start: 2021-01-01 | End: 2021-01-01

## 2021-01-01 RX ORDER — HALOPERIDOL 2 MG/ML
2 SOLUTION ORAL EVERY 4 HOURS PRN
Qty: 30 ML | Refills: 0 | Status: SHIPPED | OUTPATIENT
Start: 2021-01-01

## 2021-01-01 RX ORDER — SODIUM CHLORIDE 9 MG/ML
30 INJECTION, SOLUTION INTRAVENOUS CONTINUOUS
Status: DISCONTINUED | OUTPATIENT
Start: 2021-01-01 | End: 2021-01-01

## 2021-01-01 RX ORDER — LETROZOLE 2.5 MG/1
2.5 TABLET, FILM COATED ORAL DAILY
COMMUNITY
End: 2021-01-01 | Stop reason: HOSPADM

## 2021-01-01 RX ORDER — ATORVASTATIN CALCIUM 10 MG/1
20 TABLET, FILM COATED ORAL DAILY
Status: DISCONTINUED | OUTPATIENT
Start: 2021-01-01 | End: 2021-01-01

## 2021-01-01 RX ORDER — AMLODIPINE BESYLATE 5 MG/1
5 TABLET ORAL DAILY
Qty: 30 TABLET | Refills: 11 | Status: SHIPPED | OUTPATIENT
Start: 2021-01-01 | End: 2021-01-01 | Stop reason: HOSPADM

## 2021-01-01 RX ORDER — SIMVASTATIN 40 MG
40 TABLET ORAL NIGHTLY
COMMUNITY
End: 2021-01-01

## 2021-01-01 RX ORDER — ACETAMINOPHEN 325 MG/1
650 TABLET ORAL EVERY 4 HOURS PRN
Start: 2021-01-01 | End: 2021-01-01 | Stop reason: HOSPADM

## 2021-01-01 RX ORDER — CEFDINIR 300 MG/1
300 CAPSULE ORAL EVERY 12 HOURS SCHEDULED
Qty: 5 CAPSULE | Refills: 0 | Status: ON HOLD
Start: 2021-01-01 | End: 2021-01-01

## 2021-01-01 RX ORDER — PENTOXIFYLLINE 400 MG/1
400 TABLET, EXTENDED RELEASE ORAL 2 TIMES DAILY
Status: DISCONTINUED | OUTPATIENT
Start: 2021-01-01 | End: 2021-01-01 | Stop reason: HOSPADM

## 2021-01-01 RX ORDER — ONDANSETRON 2 MG/ML
4 INJECTION INTRAMUSCULAR; INTRAVENOUS EVERY 6 HOURS PRN
Status: DISCONTINUED | OUTPATIENT
Start: 2021-01-01 | End: 2021-01-01 | Stop reason: HOSPADM

## 2021-01-01 RX ORDER — DOXEPIN HYDROCHLORIDE 10 MG/1
10 CAPSULE ORAL DAILY
COMMUNITY

## 2021-01-01 RX ORDER — ATORVASTATIN CALCIUM 20 MG/1
20 TABLET, FILM COATED ORAL DAILY
COMMUNITY
End: 2021-01-01 | Stop reason: ALTCHOICE

## 2021-01-01 RX ORDER — METFORMIN HYDROCHLORIDE 500 MG/1
500 TABLET, EXTENDED RELEASE ORAL 2 TIMES DAILY WITH MEALS
Status: DISCONTINUED | OUTPATIENT
Start: 2021-01-01 | End: 2021-01-01 | Stop reason: HOSPADM

## 2021-01-01 RX ORDER — MORPHINE SULFATE 20 MG/ML
10 SOLUTION ORAL
Qty: 180 ML | Refills: 0 | Status: SHIPPED | OUTPATIENT
Start: 2021-01-01 | End: 2021-01-01

## 2021-01-01 RX ORDER — SODIUM CHLORIDE 9 MG/ML
125 INJECTION, SOLUTION INTRAVENOUS CONTINUOUS
Status: DISCONTINUED | OUTPATIENT
Start: 2021-01-01 | End: 2021-01-01

## 2021-01-01 RX ORDER — TAMSULOSIN HYDROCHLORIDE 0.4 MG/1
1 CAPSULE ORAL DAILY
Qty: 15 CAPSULE | Refills: 0 | Status: SHIPPED | OUTPATIENT
Start: 2021-01-01

## 2021-01-01 RX ORDER — HYDRALAZINE HYDROCHLORIDE 25 MG/1
25 TABLET, FILM COATED ORAL 2 TIMES DAILY
COMMUNITY
End: 2021-01-01 | Stop reason: HOSPADM

## 2021-01-01 RX ORDER — ATORVASTATIN CALCIUM 20 MG/1
20 TABLET, FILM COATED ORAL
COMMUNITY
End: 2021-01-01 | Stop reason: HOSPADM

## 2021-01-01 RX ORDER — CEFDINIR 300 MG/1
300 CAPSULE ORAL 2 TIMES DAILY
COMMUNITY
Start: 2021-01-01 | End: 2021-01-01 | Stop reason: HOSPADM

## 2021-01-01 RX ORDER — DILTIAZEM HYDROCHLORIDE 5 MG/ML
5 INJECTION INTRAVENOUS ONCE
Status: DISCONTINUED | OUTPATIENT
Start: 2021-01-01 | End: 2021-01-01

## 2021-01-01 RX ORDER — POLYETHYLENE GLYCOL 3350 17 G/17G
17 POWDER, FOR SOLUTION ORAL DAILY
Status: DISCONTINUED | OUTPATIENT
Start: 2021-01-01 | End: 2021-01-01 | Stop reason: HOSPADM

## 2021-01-01 RX ORDER — NICOTINE POLACRILEX 4 MG
15 LOZENGE BUCCAL
Status: DISCONTINUED | OUTPATIENT
Start: 2021-01-01 | End: 2021-01-01 | Stop reason: HOSPADM

## 2021-01-01 RX ORDER — ZIPRASIDONE MESYLATE 20 MG/ML
20 INJECTION, POWDER, LYOPHILIZED, FOR SOLUTION INTRAMUSCULAR ONCE
Status: COMPLETED | OUTPATIENT
Start: 2021-01-01 | End: 2021-01-01

## 2021-01-01 RX ORDER — LIDOCAINE HYDROCHLORIDE 20 MG/ML
5 SOLUTION OROPHARYNGEAL
Qty: 100 ML | Refills: 0 | Status: SHIPPED | OUTPATIENT
Start: 2021-01-01

## 2021-01-01 RX ORDER — RISPERIDONE 1 MG/ML
0.5 SOLUTION ORAL EVERY 12 HOURS SCHEDULED
Status: DISCONTINUED | OUTPATIENT
Start: 2021-01-01 | End: 2021-01-01

## 2021-01-01 RX ORDER — LORAZEPAM 2 MG/ML
2 INJECTION INTRAMUSCULAR ONCE
Status: COMPLETED | OUTPATIENT
Start: 2021-01-01 | End: 2021-01-01

## 2021-01-01 RX ORDER — SODIUM CHLORIDE, SODIUM LACTATE, POTASSIUM CHLORIDE, CALCIUM CHLORIDE 600; 310; 30; 20 MG/100ML; MG/100ML; MG/100ML; MG/100ML
75 INJECTION, SOLUTION INTRAVENOUS CONTINUOUS
Status: DISCONTINUED | OUTPATIENT
Start: 2021-01-01 | End: 2021-01-01

## 2021-01-01 RX ORDER — PROCHLORPERAZINE 25 MG
25 SUPPOSITORY, RECTAL RECTAL EVERY 12 HOURS PRN
Status: DISCONTINUED | OUTPATIENT
Start: 2021-01-01 | End: 2021-01-01 | Stop reason: HOSPADM

## 2021-01-01 RX ORDER — SODIUM CHLORIDE 0.9 % (FLUSH) 0.9 %
10 SYRINGE (ML) INJECTION EVERY 12 HOURS SCHEDULED
Status: DISCONTINUED | OUTPATIENT
Start: 2021-01-01 | End: 2021-01-01 | Stop reason: HOSPADM

## 2021-01-01 RX ORDER — LOSARTAN POTASSIUM 50 MG/1
50 TABLET ORAL DAILY
COMMUNITY
End: 2021-01-01

## 2021-01-01 RX ORDER — RISPERIDONE 1 MG/ML
0.25 SOLUTION ORAL EVERY 12 HOURS SCHEDULED
Status: DISCONTINUED | OUTPATIENT
Start: 2021-01-01 | End: 2021-01-01 | Stop reason: HOSPADM

## 2021-01-01 RX ORDER — SUCRALFATE 1 G/1
1 TABLET ORAL 4 TIMES DAILY
Status: DISCONTINUED | OUTPATIENT
Start: 2021-01-01 | End: 2021-01-01

## 2021-01-01 RX ORDER — METOCLOPRAMIDE HYDROCHLORIDE 5 MG/ML
10 INJECTION INTRAMUSCULAR; INTRAVENOUS ONCE
Status: COMPLETED | OUTPATIENT
Start: 2021-01-01 | End: 2021-01-01

## 2021-01-01 RX ORDER — NICOTINE POLACRILEX 4 MG
15 LOZENGE BUCCAL
Status: DISCONTINUED | OUTPATIENT
Start: 2021-01-01 | End: 2021-01-01

## 2021-01-01 RX ORDER — DOCUSATE SODIUM 100 MG/1
100 CAPSULE, LIQUID FILLED ORAL 2 TIMES DAILY
COMMUNITY
End: 2021-01-01 | Stop reason: HOSPADM

## 2021-01-01 RX ORDER — PENTOXIFYLLINE 400 MG/1
400 TABLET, EXTENDED RELEASE ORAL
Status: DISCONTINUED | OUTPATIENT
Start: 2021-01-01 | End: 2021-01-01

## 2021-01-01 RX ORDER — ONDANSETRON 2 MG/ML
4 INJECTION INTRAMUSCULAR; INTRAVENOUS ONCE
Status: COMPLETED | OUTPATIENT
Start: 2021-01-01 | End: 2021-01-01

## 2021-01-01 RX ORDER — CLOTRIMAZOLE AND BETAMETHASONE DIPROPIONATE 10; .64 MG/G; MG/G
CREAM TOPICAL 2 TIMES DAILY
Qty: 45 G | Refills: 1 | Status: SHIPPED | OUTPATIENT
Start: 2021-01-01 | End: 2021-01-01 | Stop reason: HOSPADM

## 2021-01-01 RX ORDER — SCOLOPAMINE TRANSDERMAL SYSTEM 1 MG/1
1 PATCH, EXTENDED RELEASE TRANSDERMAL
Status: DISCONTINUED | OUTPATIENT
Start: 2021-01-01 | End: 2021-01-01 | Stop reason: HOSPADM

## 2021-01-01 RX ORDER — QUETIAPINE FUMARATE 25 MG/1
25 TABLET, FILM COATED ORAL NIGHTLY PRN
Start: 2021-01-01 | End: 2021-01-01

## 2021-01-01 RX ORDER — BISOPROLOL FUMARATE 10 MG/1
10 TABLET, FILM COATED ORAL DAILY
COMMUNITY
End: 2021-01-01 | Stop reason: HOSPADM

## 2021-01-01 RX ORDER — DEXTROSE MONOHYDRATE 50 MG/ML
75 INJECTION, SOLUTION INTRAVENOUS CONTINUOUS
Status: DISCONTINUED | OUTPATIENT
Start: 2021-01-01 | End: 2021-01-01

## 2021-01-01 RX ORDER — MELOXICAM 7.5 MG/1
7.5 TABLET ORAL DAILY
COMMUNITY
End: 2021-01-01 | Stop reason: HOSPADM

## 2021-01-01 RX ORDER — DOXAZOSIN 8 MG/1
8 TABLET ORAL
COMMUNITY
End: 2021-01-01 | Stop reason: HOSPADM

## 2021-01-01 RX ORDER — NYSTATIN 100000 [USP'U]/G
POWDER TOPICAL EVERY 12 HOURS SCHEDULED
Status: ON HOLD
Start: 2021-01-01 | End: 2021-01-01

## 2021-01-01 RX ORDER — FUROSEMIDE 40 MG/1
40 TABLET ORAL DAILY
COMMUNITY
End: 2021-01-01 | Stop reason: HOSPADM

## 2021-01-01 RX ORDER — PROCHLORPERAZINE EDISYLATE 5 MG/ML
5 INJECTION INTRAMUSCULAR; INTRAVENOUS EVERY 6 HOURS PRN
Status: DISCONTINUED | OUTPATIENT
Start: 2021-01-01 | End: 2021-01-01 | Stop reason: HOSPADM

## 2021-01-01 RX ORDER — NYSTATIN 100000 [USP'U]/G
1 POWDER TOPICAL EVERY 12 HOURS
COMMUNITY
End: 2021-01-01 | Stop reason: HOSPADM

## 2021-01-01 RX ORDER — BISACODYL 10 MG
10 SUPPOSITORY, RECTAL RECTAL DAILY
Status: DISCONTINUED | OUTPATIENT
Start: 2021-01-01 | End: 2021-01-01 | Stop reason: HOSPADM

## 2021-01-01 RX ORDER — MORPHINE SULFATE 2 MG/ML
2 INJECTION, SOLUTION INTRAMUSCULAR; INTRAVENOUS ONCE
Status: COMPLETED | OUTPATIENT
Start: 2021-01-01 | End: 2021-01-01

## 2021-01-01 RX ORDER — PANTOPRAZOLE SODIUM 40 MG/1
40 TABLET, DELAYED RELEASE ORAL
Status: DISCONTINUED | OUTPATIENT
Start: 2021-01-01 | End: 2021-01-01 | Stop reason: HOSPADM

## 2021-01-01 RX ORDER — MORPHINE SULFATE 20 MG/ML
10 SOLUTION ORAL
Status: DISCONTINUED | OUTPATIENT
Start: 2021-01-01 | End: 2021-01-01 | Stop reason: HOSPADM

## 2021-01-01 RX ORDER — DIPHENHYDRAMINE HYDROCHLORIDE 50 MG/ML
INJECTION INTRAMUSCULAR; INTRAVENOUS AS NEEDED
Status: DISCONTINUED | OUTPATIENT
Start: 2021-01-01 | End: 2021-01-01 | Stop reason: HOSPADM

## 2021-01-01 RX ORDER — ATORVASTATIN CALCIUM 10 MG/1
20 TABLET, FILM COATED ORAL DAILY
Status: DISCONTINUED | OUTPATIENT
Start: 2021-01-01 | End: 2021-01-01 | Stop reason: HOSPADM

## 2021-01-01 RX ORDER — NYSTATIN 100000 [USP'U]/G
POWDER TOPICAL EVERY 12 HOURS SCHEDULED
Status: DISCONTINUED | OUTPATIENT
Start: 2021-01-01 | End: 2021-01-01 | Stop reason: HOSPADM

## 2021-01-01 RX ORDER — QUETIAPINE FUMARATE 25 MG/1
25 TABLET, FILM COATED ORAL NIGHTLY
Start: 2021-01-01 | End: 2021-01-01 | Stop reason: HOSPADM

## 2021-01-01 RX ORDER — LIDOCAINE HYDROCHLORIDE 20 MG/ML
5 SOLUTION OROPHARYNGEAL
Status: DISCONTINUED | OUTPATIENT
Start: 2021-01-01 | End: 2021-01-01 | Stop reason: HOSPADM

## 2021-01-01 RX ORDER — PROCHLORPERAZINE MALEATE 10 MG
5 TABLET ORAL EVERY 6 HOURS PRN
Status: DISCONTINUED | OUTPATIENT
Start: 2021-01-01 | End: 2021-01-01 | Stop reason: HOSPADM

## 2021-01-01 RX ORDER — QUETIAPINE FUMARATE 25 MG/1
25 TABLET, FILM COATED ORAL NIGHTLY PRN
Status: DISCONTINUED | OUTPATIENT
Start: 2021-01-01 | End: 2021-01-01 | Stop reason: HOSPADM

## 2021-01-01 RX ORDER — CEFDINIR 300 MG/1
300 CAPSULE ORAL EVERY 12 HOURS SCHEDULED
Qty: 7 CAPSULE | Refills: 0 | Status: SHIPPED | OUTPATIENT
Start: 2021-01-01 | End: 2021-01-01

## 2021-01-01 RX ORDER — ONDANSETRON 4 MG/1
4 TABLET, FILM COATED ORAL EVERY 8 HOURS PRN
COMMUNITY
End: 2021-01-01 | Stop reason: HOSPADM

## 2021-01-01 RX ORDER — DOXAZOSIN 8 MG/1
8 TABLET ORAL 2 TIMES DAILY
COMMUNITY
End: 2021-01-01

## 2021-01-01 RX ORDER — ALUMINA, MAGNESIA, AND SIMETHICONE 2400; 2400; 240 MG/30ML; MG/30ML; MG/30ML
15 SUSPENSION ORAL EVERY 6 HOURS PRN
Status: DISCONTINUED | OUTPATIENT
Start: 2021-01-01 | End: 2021-01-01 | Stop reason: HOSPADM

## 2021-01-01 RX ORDER — SODIUM CHLORIDE, SODIUM LACTATE, POTASSIUM CHLORIDE, CALCIUM CHLORIDE 600; 310; 30; 20 MG/100ML; MG/100ML; MG/100ML; MG/100ML
50 INJECTION, SOLUTION INTRAVENOUS CONTINUOUS
Status: DISCONTINUED | OUTPATIENT
Start: 2021-01-01 | End: 2021-01-01

## 2021-01-01 RX ORDER — ALLOPURINOL 300 MG/1
300 TABLET ORAL DAILY
COMMUNITY
End: 2021-01-01 | Stop reason: HOSPADM

## 2021-01-01 RX ORDER — TAMSULOSIN HYDROCHLORIDE 0.4 MG/1
1 CAPSULE ORAL NIGHTLY
Qty: 30 CAPSULE | Refills: 1 | Status: SHIPPED | OUTPATIENT
Start: 2021-01-01 | End: 2021-01-01 | Stop reason: HOSPADM

## 2021-01-01 RX ORDER — ONDANSETRON 4 MG/1
4 TABLET, ORALLY DISINTEGRATING ORAL EVERY 6 HOURS PRN
Status: DISCONTINUED | OUTPATIENT
Start: 2021-01-01 | End: 2021-01-01 | Stop reason: HOSPADM

## 2021-01-01 RX ORDER — GLIMEPIRIDE 4 MG/1
4 TABLET ORAL 2 TIMES DAILY
COMMUNITY
End: 2021-01-01 | Stop reason: HOSPADM

## 2021-01-01 RX ORDER — LANOLIN ALCOHOL/MO/W.PET/CERES
3 CREAM (GRAM) TOPICAL NIGHTLY
Status: DISCONTINUED | OUTPATIENT
Start: 2021-01-01 | End: 2021-01-01 | Stop reason: HOSPADM

## 2021-01-01 RX ORDER — ATORVASTATIN CALCIUM 20 MG/1
20 TABLET, FILM COATED ORAL DAILY
Status: ON HOLD
Start: 2021-01-01 | End: 2021-01-01

## 2021-01-01 RX ORDER — DIPHENHYDRAMINE HYDROCHLORIDE 50 MG/ML
25 INJECTION INTRAMUSCULAR; INTRAVENOUS ONCE
Status: COMPLETED | OUTPATIENT
Start: 2021-01-01 | End: 2021-01-01

## 2021-01-01 RX ORDER — MIDAZOLAM HYDROCHLORIDE 1 MG/ML
INJECTION, SOLUTION INTRAMUSCULAR; INTRAVENOUS AS NEEDED
Status: DISCONTINUED | OUTPATIENT
Start: 2021-01-01 | End: 2021-01-01 | Stop reason: HOSPADM

## 2021-01-01 RX ORDER — ONDANSETRON 4 MG/1
4 TABLET, ORALLY DISINTEGRATING ORAL EVERY 6 HOURS PRN
Qty: 12 TABLET | Refills: 0 | Status: SHIPPED | OUTPATIENT
Start: 2021-01-01 | End: 2021-01-01

## 2021-01-01 RX ORDER — FENTANYL CITRATE 50 UG/ML
INJECTION, SOLUTION INTRAMUSCULAR; INTRAVENOUS AS NEEDED
Status: DISCONTINUED | OUTPATIENT
Start: 2021-01-01 | End: 2021-01-01 | Stop reason: HOSPADM

## 2021-01-01 RX ORDER — HYDROCODONE BITARTRATE AND ACETAMINOPHEN 5; 325 MG/1; MG/1
1 TABLET ORAL EVERY 6 HOURS PRN
Qty: 12 TABLET | Refills: 0 | Status: SHIPPED | OUTPATIENT
Start: 2021-01-01 | End: 2021-01-01 | Stop reason: HOSPADM

## 2021-01-01 RX ADMIN — SODIUM CHLORIDE 30 ML/HR: 9 INJECTION, SOLUTION INTRAVENOUS at 05:54

## 2021-01-01 RX ADMIN — PENTOXIFYLLINE 400 MG: 400 TABLET, EXTENDED RELEASE ORAL at 08:01

## 2021-01-01 RX ADMIN — ALLOPURINOL 300 MG: 300 TABLET ORAL at 09:37

## 2021-01-01 RX ADMIN — ONDANSETRON HYDROCHLORIDE 4 MG: 2 SOLUTION INTRAMUSCULAR; INTRAVENOUS at 21:05

## 2021-01-01 RX ADMIN — PERFLUTREN 8.48 MG: 6.52 INJECTION, SUSPENSION INTRAVENOUS at 12:12

## 2021-01-01 RX ADMIN — ACETAMINOPHEN 650 MG: 325 TABLET, FILM COATED ORAL at 09:07

## 2021-01-01 RX ADMIN — PENTOXIFYLLINE 400 MG: 400 TABLET, EXTENDED RELEASE ORAL at 18:20

## 2021-01-01 RX ADMIN — TERAZOSIN HYDROCHLORIDE 5 MG: 5 CAPSULE ORAL at 20:44

## 2021-01-01 RX ADMIN — RIVAROXABAN 20 MG: 20 TABLET, FILM COATED ORAL at 17:32

## 2021-01-01 RX ADMIN — BUPROPION HYDROCHLORIDE 300 MG: 150 TABLET, FILM COATED, EXTENDED RELEASE ORAL at 10:02

## 2021-01-01 RX ADMIN — SODIUM CHLORIDE, PRESERVATIVE FREE 10 ML: 5 INJECTION INTRAVENOUS at 08:58

## 2021-01-01 RX ADMIN — PANTOPRAZOLE SODIUM 40 MG: 40 TABLET, DELAYED RELEASE ORAL at 21:56

## 2021-01-01 RX ADMIN — SODIUM CHLORIDE 125 ML/HR: 9 INJECTION, SOLUTION INTRAVENOUS at 13:54

## 2021-01-01 RX ADMIN — CEFDINIR 300 MG: 300 CAPSULE ORAL at 09:31

## 2021-01-01 RX ADMIN — PENTOXIFYLLINE 400 MG: 400 TABLET, EXTENDED RELEASE ORAL at 09:31

## 2021-01-01 RX ADMIN — PENTOXIFYLLINE 400 MG: 400 TABLET, EXTENDED RELEASE ORAL at 09:03

## 2021-01-01 RX ADMIN — RIVAROXABAN 20 MG: 20 TABLET, FILM COATED ORAL at 18:37

## 2021-01-01 RX ADMIN — SUCRALFATE 1 G: 1 TABLET ORAL at 06:23

## 2021-01-01 RX ADMIN — ATORVASTATIN CALCIUM 20 MG: 10 TABLET, FILM COATED ORAL at 09:07

## 2021-01-01 RX ADMIN — PENTOXIFYLLINE 400 MG: 400 TABLET, EXTENDED RELEASE ORAL at 17:43

## 2021-01-01 RX ADMIN — INSULIN LISPRO 3 UNITS: 100 INJECTION, SOLUTION INTRAVENOUS; SUBCUTANEOUS at 08:26

## 2021-01-01 RX ADMIN — SUCRALFATE 1 G: 1 TABLET ORAL at 17:49

## 2021-01-01 RX ADMIN — NYSTATIN: 100000 POWDER TOPICAL at 20:08

## 2021-01-01 RX ADMIN — RIVAROXABAN 20 MG: 20 TABLET, FILM COATED ORAL at 18:00

## 2021-01-01 RX ADMIN — POLYETHYLENE GLYCOL 3350 17 G: 17 POWDER, FOR SOLUTION ORAL at 15:46

## 2021-01-01 RX ADMIN — BISACODYL 10 MG: 10 SUPPOSITORY RECTAL at 17:49

## 2021-01-01 RX ADMIN — MORPHINE SULFATE 4 MG: 4 INJECTION, SOLUTION INTRAMUSCULAR; INTRAVENOUS at 21:05

## 2021-01-01 RX ADMIN — Medication 250 MG: at 09:31

## 2021-01-01 RX ADMIN — BISACODYL 10 MG: 10 SUPPOSITORY RECTAL at 11:49

## 2021-01-01 RX ADMIN — PENTOXIFYLLINE 400 MG: 400 TABLET, EXTENDED RELEASE ORAL at 10:02

## 2021-01-01 RX ADMIN — INSULIN LISPRO 4 UNITS: 100 INJECTION, SOLUTION INTRAVENOUS; SUBCUTANEOUS at 16:40

## 2021-01-01 RX ADMIN — CEFTRIAXONE SODIUM 1 G: 1 INJECTION, POWDER, FOR SOLUTION INTRAMUSCULAR; INTRAVENOUS at 14:11

## 2021-01-01 RX ADMIN — PENTOXIFYLLINE 400 MG: 400 TABLET, EXTENDED RELEASE ORAL at 21:32

## 2021-01-01 RX ADMIN — LOSARTAN POTASSIUM 100 MG: 50 TABLET, FILM COATED ORAL at 08:01

## 2021-01-01 RX ADMIN — ACETAMINOPHEN 650 MG: 325 TABLET, FILM COATED ORAL at 06:59

## 2021-01-01 RX ADMIN — RIVAROXABAN 20 MG: 20 TABLET, FILM COATED ORAL at 22:45

## 2021-01-01 RX ADMIN — TAMSULOSIN HYDROCHLORIDE 0.4 MG: 0.4 CAPSULE ORAL at 09:35

## 2021-01-01 RX ADMIN — BISOPROLOL FUMARATE 10 MG: 10 TABLET, FILM COATED ORAL at 08:01

## 2021-01-01 RX ADMIN — INSULIN LISPRO 4 UNITS: 100 INJECTION, SOLUTION INTRAVENOUS; SUBCUTANEOUS at 08:01

## 2021-01-01 RX ADMIN — Medication 3 MG: at 21:39

## 2021-01-01 RX ADMIN — INSULIN LISPRO 3 UNITS: 100 INJECTION, SOLUTION INTRAVENOUS; SUBCUTANEOUS at 12:20

## 2021-01-01 RX ADMIN — Medication 250 MG: at 21:21

## 2021-01-01 RX ADMIN — SODIUM CHLORIDE, POTASSIUM CHLORIDE, SODIUM LACTATE AND CALCIUM CHLORIDE 50 ML/HR: 600; 310; 30; 20 INJECTION, SOLUTION INTRAVENOUS at 21:49

## 2021-01-01 RX ADMIN — DOXEPIN HYDROCHLORIDE 10 MG: 10 CAPSULE ORAL at 08:01

## 2021-01-01 RX ADMIN — SODIUM CHLORIDE, PRESERVATIVE FREE 10 ML: 5 INJECTION INTRAVENOUS at 20:35

## 2021-01-01 RX ADMIN — RIVAROXABAN 15 MG: 15 TABLET, FILM COATED ORAL at 18:20

## 2021-01-01 RX ADMIN — Medication 250 MG: at 10:02

## 2021-01-01 RX ADMIN — INSULIN LISPRO 3 UNITS: 100 INJECTION, SOLUTION INTRAVENOUS; SUBCUTANEOUS at 12:16

## 2021-01-01 RX ADMIN — RISPERIDONE 0.25 MG: 1 SOLUTION ORAL at 08:57

## 2021-01-01 RX ADMIN — ALLOPURINOL 300 MG: 300 TABLET ORAL at 08:01

## 2021-01-01 RX ADMIN — TERAZOSIN HYDROCHLORIDE 5 MG: 5 CAPSULE ORAL at 08:20

## 2021-01-01 RX ADMIN — RISPERIDONE 0.25 MG: 1 SOLUTION ORAL at 21:39

## 2021-01-01 RX ADMIN — PENTOXIFYLLINE 400 MG: 400 TABLET, EXTENDED RELEASE ORAL at 08:56

## 2021-01-01 RX ADMIN — BISOPROLOL FUMARATE 10 MG: 10 TABLET ORAL at 09:31

## 2021-01-01 RX ADMIN — ALTEPLASE 1 MG/HR: 2.2 INJECTION, POWDER, LYOPHILIZED, FOR SOLUTION INTRAVENOUS at 17:26

## 2021-01-01 RX ADMIN — SODIUM CHLORIDE, POTASSIUM CHLORIDE, SODIUM LACTATE AND CALCIUM CHLORIDE 75 ML/HR: 600; 310; 30; 20 INJECTION, SOLUTION INTRAVENOUS at 05:46

## 2021-01-01 RX ADMIN — PANTOPRAZOLE SODIUM 40 MG: 40 TABLET, DELAYED RELEASE ORAL at 08:57

## 2021-01-01 RX ADMIN — LIDOCAINE HYDROCHLORIDE 5 ML: 20 SOLUTION ORAL; TOPICAL at 17:15

## 2021-01-01 RX ADMIN — RIVAROXABAN 15 MG: 15 TABLET, FILM COATED ORAL at 17:16

## 2021-01-01 RX ADMIN — MUPIROCIN: 20 OINTMENT TOPICAL at 09:04

## 2021-01-01 RX ADMIN — CEFTRIAXONE SODIUM 500 MG: 500 INJECTION, POWDER, FOR SOLUTION INTRAMUSCULAR; INTRAVENOUS at 04:17

## 2021-01-01 RX ADMIN — RIVAROXABAN 20 MG: 20 TABLET, FILM COATED ORAL at 17:01

## 2021-01-01 RX ADMIN — BISOPROLOL FUMARATE 10 MG: 10 TABLET ORAL at 10:02

## 2021-01-01 RX ADMIN — PENTOXIFYLLINE 400 MG: 400 TABLET, EXTENDED RELEASE ORAL at 09:35

## 2021-01-01 RX ADMIN — MUPIROCIN: 20 OINTMENT TOPICAL at 10:51

## 2021-01-01 RX ADMIN — ALLOPURINOL 300 MG: 300 TABLET ORAL at 10:03

## 2021-01-01 RX ADMIN — RIVAROXABAN 15 MG: 10 TABLET, FILM COATED ORAL at 08:56

## 2021-01-01 RX ADMIN — PANTOPRAZOLE SODIUM 40 MG: 40 TABLET, DELAYED RELEASE ORAL at 17:34

## 2021-01-01 RX ADMIN — NYSTATIN: 100000 POWDER TOPICAL at 08:58

## 2021-01-01 RX ADMIN — RIVAROXABAN 15 MG: 10 TABLET, FILM COATED ORAL at 08:12

## 2021-01-01 RX ADMIN — BUPROPION HYDROCHLORIDE 300 MG: 150 TABLET, FILM COATED, EXTENDED RELEASE ORAL at 09:31

## 2021-01-01 RX ADMIN — DIPHENHYDRAMINE HYDROCHLORIDE 25 MG: 50 INJECTION, SOLUTION INTRAMUSCULAR; INTRAVENOUS at 00:11

## 2021-01-01 RX ADMIN — RIVAROXABAN 15 MG: 15 TABLET, FILM COATED ORAL at 09:07

## 2021-01-01 RX ADMIN — TAMSULOSIN HYDROCHLORIDE 0.4 MG: 0.4 CAPSULE ORAL at 10:01

## 2021-01-01 RX ADMIN — PANTOPRAZOLE SODIUM 40 MG: 40 TABLET, DELAYED RELEASE ORAL at 08:44

## 2021-01-01 RX ADMIN — TAMSULOSIN HYDROCHLORIDE 0.4 MG: 0.4 CAPSULE ORAL at 09:00

## 2021-01-01 RX ADMIN — ACETAMINOPHEN 650 MG: 325 TABLET, FILM COATED ORAL at 20:35

## 2021-01-01 RX ADMIN — INSULIN LISPRO 4 UNITS: 100 INJECTION, SOLUTION INTRAVENOUS; SUBCUTANEOUS at 08:20

## 2021-01-01 RX ADMIN — ZIPRASIDONE MESYLATE 20 MG: 20 INJECTION, POWDER, LYOPHILIZED, FOR SOLUTION INTRAMUSCULAR at 21:34

## 2021-01-01 RX ADMIN — RIVAROXABAN 15 MG: 10 TABLET, FILM COATED ORAL at 17:34

## 2021-01-01 RX ADMIN — SODIUM CHLORIDE 100 ML/HR: 9 INJECTION, SOLUTION INTRAVENOUS at 03:18

## 2021-01-01 RX ADMIN — ATORVASTATIN CALCIUM 20 MG: 10 TABLET, FILM COATED ORAL at 08:21

## 2021-01-01 RX ADMIN — DOXEPIN HYDROCHLORIDE 10 MG: 10 CAPSULE ORAL at 08:21

## 2021-01-01 RX ADMIN — PENTOXIFYLLINE 400 MG: 400 TABLET, EXTENDED RELEASE ORAL at 09:07

## 2021-01-01 RX ADMIN — ALTEPLASE 1 MG/HR: 2.2 INJECTION, POWDER, LYOPHILIZED, FOR SOLUTION INTRAVENOUS at 03:21

## 2021-01-01 RX ADMIN — SODIUM CHLORIDE, POTASSIUM CHLORIDE, SODIUM LACTATE AND CALCIUM CHLORIDE 50 ML/HR: 600; 310; 30; 20 INJECTION, SOLUTION INTRAVENOUS at 00:11

## 2021-01-01 RX ADMIN — RIVAROXABAN 15 MG: 10 TABLET, FILM COATED ORAL at 08:20

## 2021-01-01 RX ADMIN — DOXEPIN HYDROCHLORIDE 10 MG: 10 CAPSULE ORAL at 08:43

## 2021-01-01 RX ADMIN — ALLOPURINOL 300 MG: 300 TABLET ORAL at 08:57

## 2021-01-01 RX ADMIN — BISOPROLOL FUMARATE 10 MG: 10 TABLET ORAL at 12:05

## 2021-01-01 RX ADMIN — ALLOPURINOL 300 MG: 300 TABLET ORAL at 08:20

## 2021-01-01 RX ADMIN — BISOPROLOL FUMARATE 10 MG: 10 TABLET, FILM COATED ORAL at 15:46

## 2021-01-01 RX ADMIN — RIVAROXABAN 15 MG: 10 TABLET, FILM COATED ORAL at 18:01

## 2021-01-01 RX ADMIN — SODIUM CHLORIDE 100 ML/HR: 9 INJECTION, SOLUTION INTRAVENOUS at 18:40

## 2021-01-01 RX ADMIN — CEFTRIAXONE SODIUM 1 G: 1 INJECTION, POWDER, FOR SOLUTION INTRAMUSCULAR; INTRAVENOUS at 14:04

## 2021-01-01 RX ADMIN — CEFDINIR 300 MG: 300 CAPSULE ORAL at 10:02

## 2021-01-01 RX ADMIN — RIVAROXABAN 15 MG: 10 TABLET, FILM COATED ORAL at 09:04

## 2021-01-01 RX ADMIN — MUPIROCIN: 20 OINTMENT TOPICAL at 10:04

## 2021-01-01 RX ADMIN — PENTOXIFYLLINE 400 MG: 400 TABLET, EXTENDED RELEASE ORAL at 09:04

## 2021-01-01 RX ADMIN — DOXEPIN HYDROCHLORIDE 10 MG: 10 CAPSULE ORAL at 09:07

## 2021-01-01 RX ADMIN — DOXEPIN HYDROCHLORIDE 10 MG: 10 CAPSULE ORAL at 09:00

## 2021-01-01 RX ADMIN — INSULIN LISPRO 2 UNITS: 100 INJECTION, SOLUTION INTRAVENOUS; SUBCUTANEOUS at 08:20

## 2021-01-01 RX ADMIN — HUMAN INSULIN 6 UNITS: 100 INJECTION, SOLUTION SUBCUTANEOUS at 14:50

## 2021-01-01 RX ADMIN — SUCRALFATE 1 G: 1 TABLET ORAL at 21:53

## 2021-01-01 RX ADMIN — LIDOCAINE HYDROCHLORIDE 5 ML: 20 SOLUTION ORAL; TOPICAL at 08:30

## 2021-01-01 RX ADMIN — INSULIN LISPRO 2 UNITS: 100 INJECTION, SOLUTION INTRAVENOUS; SUBCUTANEOUS at 07:38

## 2021-01-01 RX ADMIN — SODIUM CHLORIDE 125 ML/HR: 9 INJECTION, SOLUTION INTRAVENOUS at 18:52

## 2021-01-01 RX ADMIN — TAMSULOSIN HYDROCHLORIDE 0.4 MG: 0.4 CAPSULE ORAL at 08:56

## 2021-01-01 RX ADMIN — INSULIN LISPRO 3 UNITS: 100 INJECTION, SOLUTION INTRAVENOUS; SUBCUTANEOUS at 17:34

## 2021-01-01 RX ADMIN — Medication 250 MG: at 08:57

## 2021-01-01 RX ADMIN — PANTOPRAZOLE SODIUM 40 MG: 40 TABLET, DELAYED RELEASE ORAL at 18:01

## 2021-01-01 RX ADMIN — CEFTRIAXONE SODIUM 1 G: 1 INJECTION, POWDER, FOR SOLUTION INTRAMUSCULAR; INTRAVENOUS at 18:41

## 2021-01-01 RX ADMIN — PENTOXIFYLLINE 400 MG: 400 TABLET, EXTENDED RELEASE ORAL at 08:12

## 2021-01-01 RX ADMIN — SODIUM CHLORIDE, POTASSIUM CHLORIDE, SODIUM LACTATE AND CALCIUM CHLORIDE 1000 ML: 600; 310; 30; 20 INJECTION, SOLUTION INTRAVENOUS at 15:59

## 2021-01-01 RX ADMIN — NYSTATIN: 100000 POWDER TOPICAL at 09:44

## 2021-01-01 RX ADMIN — ACETAMINOPHEN 650 MG: 325 TABLET, FILM COATED ORAL at 01:18

## 2021-01-01 RX ADMIN — TEMAZEPAM 7.5 MG: 7.5 CAPSULE ORAL at 20:50

## 2021-01-01 RX ADMIN — PENTOXIFYLLINE 400 MG: 400 TABLET, EXTENDED RELEASE ORAL at 20:44

## 2021-01-01 RX ADMIN — PENTOXIFYLLINE 400 MG: 400 TABLET, EXTENDED RELEASE ORAL at 08:59

## 2021-01-01 RX ADMIN — RIVAROXABAN 15 MG: 15 TABLET, FILM COATED ORAL at 08:01

## 2021-01-01 RX ADMIN — PENTOXIFYLLINE 400 MG: 400 TABLET, EXTENDED RELEASE ORAL at 08:20

## 2021-01-01 RX ADMIN — ALLOPURINOL 300 MG: 300 TABLET ORAL at 08:12

## 2021-01-01 RX ADMIN — ALUMINUM HYDROXIDE, MAGNESIUM HYDROXIDE, AND DIMETHICONE 15 ML: 400; 400; 40 SUSPENSION ORAL at 11:01

## 2021-01-01 RX ADMIN — INSULIN LISPRO 3 UNITS: 100 INJECTION, SOLUTION INTRAVENOUS; SUBCUTANEOUS at 11:48

## 2021-01-01 RX ADMIN — TERAZOSIN HYDROCHLORIDE 5 MG: 5 CAPSULE ORAL at 20:47

## 2021-01-01 RX ADMIN — ONDANSETRON 4 MG: 4 TABLET, ORALLY DISINTEGRATING ORAL at 09:04

## 2021-01-01 RX ADMIN — METFORMIN HYDROCHLORIDE 500 MG: 500 TABLET, EXTENDED RELEASE ORAL at 18:37

## 2021-01-01 RX ADMIN — INSULIN LISPRO 5 UNITS: 100 INJECTION, SOLUTION INTRAVENOUS; SUBCUTANEOUS at 11:25

## 2021-01-01 RX ADMIN — INSULIN LISPRO 2 UNITS: 100 INJECTION, SOLUTION INTRAVENOUS; SUBCUTANEOUS at 12:15

## 2021-01-01 RX ADMIN — ALLOPURINOL 300 MG: 300 TABLET ORAL at 09:31

## 2021-01-01 RX ADMIN — ATORVASTATIN CALCIUM 20 MG: 10 TABLET, FILM COATED ORAL at 08:01

## 2021-01-01 RX ADMIN — IOPAMIDOL 100 ML: 612 INJECTION, SOLUTION INTRAVENOUS at 18:06

## 2021-01-01 RX ADMIN — BUPROPION HYDROCHLORIDE 300 MG: 150 TABLET, FILM COATED, EXTENDED RELEASE ORAL at 08:56

## 2021-01-01 RX ADMIN — INSULIN LISPRO 4 UNITS: 100 INJECTION, SOLUTION INTRAVENOUS; SUBCUTANEOUS at 13:32

## 2021-01-01 RX ADMIN — Medication 3 MG: at 22:06

## 2021-01-01 RX ADMIN — TAMSULOSIN HYDROCHLORIDE 0.4 MG: 0.4 CAPSULE ORAL at 09:04

## 2021-01-01 RX ADMIN — TERAZOSIN HYDROCHLORIDE 5 MG: 5 CAPSULE ORAL at 08:01

## 2021-01-01 RX ADMIN — RIVAROXABAN 15 MG: 10 TABLET, FILM COATED ORAL at 09:00

## 2021-01-01 RX ADMIN — INSULIN LISPRO 3 UNITS: 100 INJECTION, SOLUTION INTRAVENOUS; SUBCUTANEOUS at 11:42

## 2021-01-01 RX ADMIN — CEFDINIR 300 MG: 300 CAPSULE ORAL at 08:56

## 2021-01-01 RX ADMIN — RIVAROXABAN 15 MG: 10 TABLET, FILM COATED ORAL at 09:03

## 2021-01-01 RX ADMIN — CEFDINIR 300 MG: 300 CAPSULE ORAL at 20:02

## 2021-01-01 RX ADMIN — NYSTATIN: 100000 POWDER TOPICAL at 21:21

## 2021-01-01 RX ADMIN — RISPERIDONE 0.25 MG: 1 SOLUTION ORAL at 08:45

## 2021-01-01 RX ADMIN — SODIUM CHLORIDE, PRESERVATIVE FREE 10 ML: 5 INJECTION INTRAVENOUS at 21:21

## 2021-01-01 RX ADMIN — TAMSULOSIN HYDROCHLORIDE 0.4 MG: 0.4 CAPSULE ORAL at 09:31

## 2021-01-01 RX ADMIN — PANTOPRAZOLE SODIUM 40 MG: 40 TABLET, DELAYED RELEASE ORAL at 17:26

## 2021-01-01 RX ADMIN — RISPERIDONE 0.25 MG: 1 SOLUTION ORAL at 20:43

## 2021-01-01 RX ADMIN — RIVAROXABAN 15 MG: 10 TABLET, FILM COATED ORAL at 17:49

## 2021-01-01 RX ADMIN — Medication 3 MG: at 20:44

## 2021-01-01 RX ADMIN — INSULIN LISPRO 4 UNITS: 100 INJECTION, SOLUTION INTRAVENOUS; SUBCUTANEOUS at 13:34

## 2021-01-01 RX ADMIN — Medication 250 MG: at 20:01

## 2021-01-01 RX ADMIN — ALLOPURINOL 300 MG: 300 TABLET ORAL at 09:00

## 2021-01-01 RX ADMIN — ACETAMINOPHEN 650 MG: 325 TABLET, FILM COATED ORAL at 06:53

## 2021-01-01 RX ADMIN — PANTOPRAZOLE SODIUM 40 MG: 40 TABLET, DELAYED RELEASE ORAL at 18:19

## 2021-01-01 RX ADMIN — RIVAROXABAN 15 MG: 15 TABLET, FILM COATED ORAL at 17:44

## 2021-01-01 RX ADMIN — ALLOPURINOL 300 MG: 300 TABLET ORAL at 16:41

## 2021-01-01 RX ADMIN — METFORMIN HYDROCHLORIDE 500 MG: 500 TABLET, EXTENDED RELEASE ORAL at 09:30

## 2021-01-01 RX ADMIN — NYSTATIN: 100000 POWDER TOPICAL at 10:04

## 2021-01-01 RX ADMIN — PANTOPRAZOLE SODIUM 40 MG: 40 TABLET, DELAYED RELEASE ORAL at 06:35

## 2021-01-01 RX ADMIN — ATORVASTATIN CALCIUM 20 MG: 10 TABLET, FILM COATED ORAL at 09:00

## 2021-01-01 RX ADMIN — ATROPA BELLADONNA AND OPIUM 60 MG: 16.2; 6 SUPPOSITORY RECTAL at 16:24

## 2021-01-01 RX ADMIN — ATORVASTATIN CALCIUM 20 MG: 10 TABLET, FILM COATED ORAL at 08:44

## 2021-01-01 RX ADMIN — Medication 3 MG: at 21:32

## 2021-01-01 RX ADMIN — SODIUM CHLORIDE 30 ML/HR: 9 INJECTION, SOLUTION INTRAVENOUS at 17:26

## 2021-01-01 RX ADMIN — INSULIN LISPRO 4 UNITS: 100 INJECTION, SOLUTION INTRAVENOUS; SUBCUTANEOUS at 09:07

## 2021-01-01 RX ADMIN — TEMAZEPAM 7.5 MG: 7.5 CAPSULE ORAL at 20:44

## 2021-01-01 RX ADMIN — ONDANSETRON HYDROCHLORIDE 4 MG: 2 SOLUTION INTRAMUSCULAR; INTRAVENOUS at 08:21

## 2021-01-01 RX ADMIN — DOXEPIN HYDROCHLORIDE 10 MG: 10 CAPSULE ORAL at 17:43

## 2021-01-01 RX ADMIN — ENOXAPARIN SODIUM 110 MG: 120 INJECTION SUBCUTANEOUS at 14:50

## 2021-01-01 RX ADMIN — TERAZOSIN HYDROCHLORIDE 5 MG: 5 CAPSULE ORAL at 20:50

## 2021-01-01 RX ADMIN — ALLOPURINOL 300 MG: 300 TABLET ORAL at 09:07

## 2021-01-01 RX ADMIN — MUPIROCIN: 20 OINTMENT TOPICAL at 23:34

## 2021-01-01 RX ADMIN — ATORVASTATIN CALCIUM 20 MG: 10 TABLET, FILM COATED ORAL at 09:35

## 2021-01-01 RX ADMIN — PANTOPRAZOLE SODIUM 40 MG: 40 TABLET, DELAYED RELEASE ORAL at 17:49

## 2021-01-01 RX ADMIN — NYSTATIN: 100000 POWDER TOPICAL at 20:02

## 2021-01-01 RX ADMIN — MORPHINE SULFATE 2 MG: 2 INJECTION, SOLUTION INTRAMUSCULAR; INTRAVENOUS at 10:03

## 2021-01-01 RX ADMIN — CEFTRIAXONE SODIUM 1 G: 1 INJECTION, POWDER, FOR SOLUTION INTRAMUSCULAR; INTRAVENOUS at 17:49

## 2021-01-01 RX ADMIN — SODIUM CHLORIDE 500 ML: 9 INJECTION, SOLUTION INTRAVENOUS at 13:54

## 2021-01-01 RX ADMIN — DOXEPIN HYDROCHLORIDE 10 MG: 10 CAPSULE ORAL at 08:20

## 2021-01-01 RX ADMIN — SODIUM CHLORIDE, PRESERVATIVE FREE 10 ML: 5 INJECTION INTRAVENOUS at 21:00

## 2021-01-01 RX ADMIN — INSULIN LISPRO 2 UNITS: 100 INJECTION, SOLUTION INTRAVENOUS; SUBCUTANEOUS at 22:44

## 2021-01-01 RX ADMIN — PENTOXIFYLLINE 400 MG: 400 TABLET, EXTENDED RELEASE ORAL at 17:17

## 2021-01-01 RX ADMIN — IOPAMIDOL 100 ML: 755 INJECTION, SOLUTION INTRAVENOUS at 14:36

## 2021-01-01 RX ADMIN — POLYETHYLENE GLYCOL 3350 17 G: 17 POWDER, FOR SOLUTION ORAL at 09:41

## 2021-01-01 RX ADMIN — INSULIN LISPRO 4 UNITS: 100 INJECTION, SOLUTION INTRAVENOUS; SUBCUTANEOUS at 17:26

## 2021-01-01 RX ADMIN — Medication 250 MG: at 20:04

## 2021-01-01 RX ADMIN — CEFDINIR 300 MG: 300 CAPSULE ORAL at 21:39

## 2021-01-01 RX ADMIN — LORAZEPAM 2 MG: 2 INJECTION INTRAMUSCULAR; INTRAVENOUS at 00:12

## 2021-01-01 RX ADMIN — PENTOXIFYLLINE 400 MG: 400 TABLET, EXTENDED RELEASE ORAL at 12:05

## 2021-01-01 RX ADMIN — ACETAMINOPHEN 650 MG: 325 TABLET, FILM COATED ORAL at 18:04

## 2021-01-01 RX ADMIN — PENTOXIFYLLINE 400 MG: 400 TABLET, EXTENDED RELEASE ORAL at 22:01

## 2021-01-01 RX ADMIN — CEFDINIR 300 MG: 300 CAPSULE ORAL at 20:44

## 2021-01-01 RX ADMIN — PANTOPRAZOLE SODIUM 40 MG: 40 TABLET, DELAYED RELEASE ORAL at 08:12

## 2021-01-01 RX ADMIN — INSULIN LISPRO 3 UNITS: 100 INJECTION, SOLUTION INTRAVENOUS; SUBCUTANEOUS at 09:02

## 2021-01-01 RX ADMIN — POLYETHYLENE GLYCOL 3350 17 G: 17 POWDER, FOR SOLUTION ORAL at 13:34

## 2021-01-01 RX ADMIN — INSULIN LISPRO 2 UNITS: 100 INJECTION, SOLUTION INTRAVENOUS; SUBCUTANEOUS at 11:33

## 2021-01-01 RX ADMIN — DOXEPIN HYDROCHLORIDE 10 MG: 10 CAPSULE ORAL at 09:05

## 2021-01-01 RX ADMIN — INSULIN LISPRO 2 UNITS: 100 INJECTION, SOLUTION INTRAVENOUS; SUBCUTANEOUS at 11:58

## 2021-01-01 RX ADMIN — MUPIROCIN: 20 OINTMENT TOPICAL at 21:21

## 2021-01-01 RX ADMIN — RIVAROXABAN 15 MG: 10 TABLET, FILM COATED ORAL at 17:26

## 2021-01-01 RX ADMIN — PENTOXIFYLLINE 400 MG: 400 TABLET, EXTENDED RELEASE ORAL at 21:21

## 2021-01-01 RX ADMIN — ONDANSETRON HYDROCHLORIDE 4 MG: 2 SOLUTION INTRAMUSCULAR; INTRAVENOUS at 08:12

## 2021-01-01 RX ADMIN — INSULIN LISPRO 2 UNITS: 100 INJECTION, SOLUTION INTRAVENOUS; SUBCUTANEOUS at 17:32

## 2021-01-01 RX ADMIN — PANTOPRAZOLE SODIUM 40 MG: 40 TABLET, DELAYED RELEASE ORAL at 09:04

## 2021-01-01 RX ADMIN — RIVAROXABAN 15 MG: 10 TABLET, FILM COATED ORAL at 08:43

## 2021-01-01 RX ADMIN — CEFDINIR 300 MG: 300 CAPSULE ORAL at 09:04

## 2021-01-01 RX ADMIN — TEMAZEPAM 7.5 MG: 7.5 CAPSULE ORAL at 21:39

## 2021-01-01 RX ADMIN — INSULIN LISPRO 2 UNITS: 100 INJECTION, SOLUTION INTRAVENOUS; SUBCUTANEOUS at 12:13

## 2021-01-01 RX ADMIN — METFORMIN HYDROCHLORIDE 500 MG: 500 TABLET, EXTENDED RELEASE ORAL at 10:40

## 2021-01-01 RX ADMIN — RIVAROXABAN 15 MG: 15 TABLET, FILM COATED ORAL at 08:20

## 2021-01-01 RX ADMIN — DEXTROSE MONOHYDRATE 75 ML/HR: 50 INJECTION, SOLUTION INTRAVENOUS at 09:00

## 2021-01-01 RX ADMIN — TAMSULOSIN HYDROCHLORIDE 0.4 MG: 0.4 CAPSULE ORAL at 08:12

## 2021-01-01 RX ADMIN — LOSARTAN POTASSIUM 100 MG: 50 TABLET, FILM COATED ORAL at 09:07

## 2021-01-01 RX ADMIN — INSULIN LISPRO 4 UNITS: 100 INJECTION, SOLUTION INTRAVENOUS; SUBCUTANEOUS at 17:16

## 2021-01-01 RX ADMIN — SODIUM CHLORIDE, POTASSIUM CHLORIDE, SODIUM LACTATE AND CALCIUM CHLORIDE 1000 ML: 600; 310; 30; 20 INJECTION, SOLUTION INTRAVENOUS at 14:11

## 2021-01-01 RX ADMIN — BISOPROLOL FUMARATE 10 MG: 10 TABLET ORAL at 08:57

## 2021-01-01 RX ADMIN — SODIUM CHLORIDE, POTASSIUM CHLORIDE, SODIUM LACTATE AND CALCIUM CHLORIDE 75 ML/HR: 600; 310; 30; 20 INJECTION, SOLUTION INTRAVENOUS at 20:55

## 2021-01-01 RX ADMIN — DOXEPIN HYDROCHLORIDE 10 MG: 10 CAPSULE ORAL at 08:57

## 2021-01-01 RX ADMIN — DOXEPIN HYDROCHLORIDE 10 MG: 10 CAPSULE ORAL at 09:35

## 2021-01-01 RX ADMIN — INSULIN LISPRO 2 UNITS: 100 INJECTION, SOLUTION INTRAVENOUS; SUBCUTANEOUS at 18:41

## 2021-01-01 RX ADMIN — BUPROPION HYDROCHLORIDE 300 MG: 150 TABLET, FILM COATED, EXTENDED RELEASE ORAL at 12:07

## 2021-01-01 RX ADMIN — TAMSULOSIN HYDROCHLORIDE 0.4 MG: 0.4 CAPSULE ORAL at 12:06

## 2021-01-01 RX ADMIN — PENTOXIFYLLINE 400 MG: 400 TABLET, EXTENDED RELEASE ORAL at 08:44

## 2021-01-01 RX ADMIN — PENTOXIFYLLINE 400 MG: 400 TABLET, EXTENDED RELEASE ORAL at 22:07

## 2021-01-01 RX ADMIN — TAMSULOSIN HYDROCHLORIDE 0.4 MG: 0.4 CAPSULE ORAL at 08:43

## 2021-01-01 RX ADMIN — RISPERIDONE 0.25 MG: 1 SOLUTION ORAL at 09:04

## 2021-01-01 RX ADMIN — CEFTRIAXONE SODIUM 1 G: 1 INJECTION, POWDER, FOR SOLUTION INTRAMUSCULAR; INTRAVENOUS at 16:37

## 2021-01-01 RX ADMIN — INSULIN LISPRO 6 UNITS: 100 INJECTION, SOLUTION INTRAVENOUS; SUBCUTANEOUS at 12:04

## 2021-01-01 RX ADMIN — ACETAMINOPHEN 650 MG: 325 TABLET, FILM COATED ORAL at 18:34

## 2021-01-01 RX ADMIN — ONDANSETRON HYDROCHLORIDE 4 MG: 2 SOLUTION INTRAMUSCULAR; INTRAVENOUS at 08:59

## 2021-01-01 RX ADMIN — RIVAROXABAN 15 MG: 10 TABLET, FILM COATED ORAL at 18:19

## 2021-01-01 RX ADMIN — ATORVASTATIN CALCIUM 20 MG: 10 TABLET, FILM COATED ORAL at 08:56

## 2021-01-01 RX ADMIN — DOXEPIN HYDROCHLORIDE 10 MG: 10 CAPSULE ORAL at 09:04

## 2021-01-01 RX ADMIN — INSULIN LISPRO 6 UNITS: 100 INJECTION, SOLUTION INTRAVENOUS; SUBCUTANEOUS at 19:06

## 2021-01-01 RX ADMIN — DOCUSATE SODIUM 100 MG: 100 CAPSULE ORAL at 20:47

## 2021-01-01 RX ADMIN — ENOXAPARIN SODIUM 110 MG: 120 INJECTION SUBCUTANEOUS at 07:39

## 2021-01-01 RX ADMIN — RISPERIDONE 0.25 MG: 1 SOLUTION ORAL at 09:05

## 2021-01-01 RX ADMIN — PENTOXIFYLLINE 400 MG: 400 TABLET, EXTENDED RELEASE ORAL at 21:39

## 2021-01-01 RX ADMIN — LOSARTAN POTASSIUM 100 MG: 50 TABLET, FILM COATED ORAL at 08:20

## 2021-01-01 RX ADMIN — INSULIN LISPRO 2 UNITS: 100 INJECTION, SOLUTION INTRAVENOUS; SUBCUTANEOUS at 18:07

## 2021-01-01 RX ADMIN — INSULIN LISPRO 4 UNITS: 100 INJECTION, SOLUTION INTRAVENOUS; SUBCUTANEOUS at 18:00

## 2021-01-01 RX ADMIN — PANTOPRAZOLE SODIUM 40 MG: 40 TABLET, DELAYED RELEASE ORAL at 08:22

## 2021-01-01 RX ADMIN — PENTOXIFYLLINE 400 MG: 400 TABLET, EXTENDED RELEASE ORAL at 20:02

## 2021-01-01 RX ADMIN — PENTOXIFYLLINE 400 MG: 400 TABLET, EXTENDED RELEASE ORAL at 08:21

## 2021-01-01 RX ADMIN — METFORMIN HYDROCHLORIDE 500 MG: 500 TABLET, EXTENDED RELEASE ORAL at 10:02

## 2021-01-01 RX ADMIN — QUETIAPINE FUMARATE 25 MG: 25 TABLET, FILM COATED ORAL at 20:04

## 2021-01-01 RX ADMIN — ALLOPURINOL 300 MG: 300 TABLET ORAL at 08:56

## 2021-01-01 RX ADMIN — TAMSULOSIN HYDROCHLORIDE 0.4 MG: 0.4 CAPSULE ORAL at 08:20

## 2021-01-01 RX ADMIN — INSULIN LISPRO 2 UNITS: 100 INJECTION, SOLUTION INTRAVENOUS; SUBCUTANEOUS at 10:01

## 2021-01-01 RX ADMIN — ONDANSETRON HYDROCHLORIDE 4 MG: 2 SOLUTION INTRAMUSCULAR; INTRAVENOUS at 18:21

## 2021-01-01 RX ADMIN — NYSTATIN: 100000 POWDER TOPICAL at 12:06

## 2021-01-01 RX ADMIN — CEFTRIAXONE SODIUM 1 G: 1 INJECTION, POWDER, FOR SOLUTION INTRAMUSCULAR; INTRAVENOUS at 17:44

## 2021-01-01 RX ADMIN — INSULIN LISPRO 2 UNITS: 100 INJECTION, SOLUTION INTRAVENOUS; SUBCUTANEOUS at 21:15

## 2021-01-01 RX ADMIN — INSULIN LISPRO 2 UNITS: 100 INJECTION, SOLUTION INTRAVENOUS; SUBCUTANEOUS at 12:32

## 2021-01-01 RX ADMIN — ATORVASTATIN CALCIUM 20 MG: 10 TABLET, FILM COATED ORAL at 08:20

## 2021-01-01 RX ADMIN — CEFDINIR 300 MG: 300 CAPSULE ORAL at 20:04

## 2021-01-01 RX ADMIN — CEFTRIAXONE SODIUM 1 G: 1 INJECTION, POWDER, FOR SOLUTION INTRAMUSCULAR; INTRAVENOUS at 15:12

## 2021-01-01 RX ADMIN — ONDANSETRON HYDROCHLORIDE 4 MG: 2 SOLUTION INTRAMUSCULAR; INTRAVENOUS at 12:04

## 2021-01-01 RX ADMIN — ATORVASTATIN CALCIUM 20 MG: 10 TABLET, FILM COATED ORAL at 08:12

## 2021-01-01 RX ADMIN — INSULIN LISPRO 3 UNITS: 100 INJECTION, SOLUTION INTRAVENOUS; SUBCUTANEOUS at 08:45

## 2021-01-01 RX ADMIN — RIVAROXABAN 15 MG: 10 TABLET, FILM COATED ORAL at 18:41

## 2021-01-01 RX ADMIN — TERAZOSIN HYDROCHLORIDE 5 MG: 5 CAPSULE ORAL at 09:07

## 2021-01-01 RX ADMIN — INSULIN LISPRO 2 UNITS: 100 INJECTION, SOLUTION INTRAVENOUS; SUBCUTANEOUS at 17:01

## 2021-01-01 RX ADMIN — METFORMIN HYDROCHLORIDE 500 MG: 500 TABLET, EXTENDED RELEASE ORAL at 18:00

## 2021-01-01 RX ADMIN — RISPERIDONE 0.25 MG: 1 SOLUTION ORAL at 22:07

## 2021-01-01 RX ADMIN — VANCOMYCIN HYDROCHLORIDE 2000 MG: 10 INJECTION, POWDER, LYOPHILIZED, FOR SOLUTION INTRAVENOUS at 17:44

## 2021-01-01 RX ADMIN — SODIUM CHLORIDE 30 ML/HR: 9 INJECTION, SOLUTION INTRAVENOUS at 05:57

## 2021-01-01 RX ADMIN — PENTOXIFYLLINE 400 MG: 400 TABLET, EXTENDED RELEASE ORAL at 20:04

## 2021-01-01 RX ADMIN — Medication 360 ML: at 06:05

## 2021-01-01 RX ADMIN — ONDANSETRON HYDROCHLORIDE 4 MG: 2 SOLUTION INTRAMUSCULAR; INTRAVENOUS at 21:50

## 2021-01-01 RX ADMIN — DOXEPIN HYDROCHLORIDE 10 MG: 10 CAPSULE ORAL at 08:12

## 2021-01-01 RX ADMIN — SODIUM CHLORIDE 100 ML/HR: 9 INJECTION, SOLUTION INTRAVENOUS at 18:41

## 2021-01-01 RX ADMIN — SODIUM CHLORIDE 125 ML/HR: 9 INJECTION, SOLUTION INTRAVENOUS at 17:45

## 2021-01-01 RX ADMIN — Medication 250 MG: at 12:07

## 2021-01-01 RX ADMIN — ALLOPURINOL 300 MG: 300 TABLET ORAL at 12:07

## 2021-01-01 RX ADMIN — INSULIN LISPRO 5 UNITS: 100 INJECTION, SOLUTION INTRAVENOUS; SUBCUTANEOUS at 18:20

## 2021-01-01 RX ADMIN — ALLOPURINOL 300 MG: 300 TABLET ORAL at 08:44

## 2021-01-01 RX ADMIN — CEFTRIAXONE SODIUM 1 G: 1 INJECTION, POWDER, FOR SOLUTION INTRAMUSCULAR; INTRAVENOUS at 17:34

## 2021-01-01 RX ADMIN — DOCUSATE SODIUM 100 MG: 100 CAPSULE ORAL at 10:40

## 2021-01-01 RX ADMIN — ATORVASTATIN CALCIUM 20 MG: 10 TABLET, FILM COATED ORAL at 15:28

## 2021-01-01 RX ADMIN — METOCLOPRAMIDE HYDROCHLORIDE 10 MG: 5 INJECTION INTRAMUSCULAR; INTRAVENOUS at 10:33

## 2021-01-01 RX ADMIN — DOCUSATE SODIUM 100 MG: 100 CAPSULE ORAL at 08:01

## 2021-01-01 RX ADMIN — PANTOPRAZOLE SODIUM 40 MG: 40 TABLET, DELAYED RELEASE ORAL at 09:00

## 2021-01-15 PROBLEM — I26.99 PULMONARY EMBOLUS (HCC): Status: RESOLVED | Noted: 2021-01-01 | Resolved: 2021-01-01

## 2021-01-15 PROBLEM — I26.99 PULMONARY EMBOLUS (HCC): Status: ACTIVE | Noted: 2021-01-01

## 2021-01-15 PROBLEM — I26.09 ACUTE PULMONARY EMBOLISM WITH ACUTE COR PULMONALE (HCC): Status: ACTIVE | Noted: 2021-01-01

## 2021-01-15 NOTE — H&P
[facility] - CARDIOLOGY  HISTORY AND PHYSICAL    Date of Admission: 1/15/2021  Primary Care Physician: Johnson Phan MD    Subjective     Chief Complaint: Syncope    History of Present Illness  80-year-old male with diabetes and hypertension reports has been in his usual state of health until this morning.  Says he was eating breakfast with his wife and then when he got up and walked to the sink, got dizzy and then ultimately lost consciousness, later awakening on the floor.  He reports he had some mild shortness of breath thereafter, but thinks the oxygen was later applied by EMS once they arrived did help his breathing.  Currently, he denies any chest pain or shortness of breath.  He has had intermittent paroxysms of atrial fibrillation with rapid ventricular response since arrival in ER, with associated drops in blood pressure.  Without intervention, he has converted back to sinus rhythm with subsequent improvement in blood pressure.    He denies any significant bleeding history.  He also denies any recent surgery or prolonged immobility, though notes he is rather sedentary at baseline.  He follows closely with Dr. Phan and states he is up-to-date on cancer screening    Review of Systems   Otherwise complete ROS reviewed and negative except as mentioned in the HPI.    Past Medical History:   Past Medical History:   Diagnosis Date   • Arthritis    • Diabetes (CMS/HCC)    • High blood pressure    • Hx of colonic polyp    • Sleep apnea        Past Surgical History:  Past Surgical History:   Procedure Laterality Date   • COLONOSCOPY N/A 12/13/2018    Procedure: COLONOSCOPY WITH ANESTHESIA;  Surgeon: Lauri Bourne MD;  Location: [facility] ENDOSCOPY;  Service: Gastroenterology   • COLONOSCOPY W/ POLYPECTOMY  09/22/2015    3 Tubular adenomas Ileocecal valve and at 80 cm repeat exam in 3 years   • KNEE SURGERY         Family History: family history includes Diabetes in his mother.    Social History:  reports that he  "has never smoked. He has never used smokeless tobacco. He reports that he does not drink alcohol.    Medications:  Prior to Admission medications    Medication Sig Start Date End Date Taking? Authorizing Provider   allopurinol (ZYLOPRIM) 300 MG tablet  10/25/17   Sheryl Pulliam MD   amLODIPine (NORVASC) 10 MG tablet  10/25/17   Sheryl Pulliam MD   aspirin 81 MG EC tablet Take 81 mg by mouth Daily.    Sheryl Pulliam MD   bisoprolol (ZEBeta) 10 MG tablet  10/30/17   Sheryl Pulliam MD   doxazosin (CARDURA) 8 MG tablet  10/25/17   Sheryl Pulliam MD   doxepin (SINEquan) 10 MG capsule  10/30/17   Sheryl Pulliam MD   furosemide (LASIX) 40 MG tablet  10/30/17   Sheryl Pulliam MD   glimepiride (AMARYL) 4 MG tablet  10/25/17   Sheryl Pulliam MD   hydrALAZINE (APRESOLINE) 25 MG tablet  10/30/17   Sheryl Pulliam MD   losartan (COZAAR) 100 MG tablet  10/30/17   Sheryl Pulliam MD   metFORMIN ER (GLUCOPHAGE-XR) 500 MG 24 hr tablet  10/30/17   Sheryl Pulliam MD   pentoxifylline (TRENtal) 400 MG CR tablet  10/30/17   Sheryl uPlliam MD   polyethylene glycol (GoLYTELY) 236 g solution Take as directed by office instructions. 11/6/18   Tori Chin APRN     Allergies:  No Known Allergies    Objective     Vital Signs: /73   Pulse 108   Temp 97.4 °F (36.3 °C)   Resp 18   Ht 185.4 cm (73\")   Wt 113 kg (250 lb)   SpO2 95%   BMI 32.98 kg/m²     Vitals signs and nursing note reviewed.   Constitutional:       General: Not in acute distress.     Appearance: Not in distress.   HENT:    Mouth/Throat:      Pharynx: Oropharynx is clear. Normal.   Neck:      Musculoskeletal: Normal range of motion and neck supple.      Thyroid: Thyroid normal. No thyromegaly.      Vascular: No JVD.   Pulmonary:      Effort: Pulmonary effort is normal.      Breath sounds: Normal breath sounds.   Cardiovascular:      PMI at left midclavicular line. Tachycardia " present. Regular rhythm.      Murmurs: There is no murmur.   Pulses:     Intact distal pulses.   Edema:     Peripheral edema absent.   Abdominal:      General: Bowel sounds are normal. There is no distension.      Palpations: Abdomen is soft. There is no hepatomegaly or splenomegaly.      Tenderness: There is no abdominal tenderness.   Musculoskeletal:         General: No tenderness.   Skin:     General: Skin is warm and dry.   Neurological:      Mental Status: Alert, oriented to person, place, and time and oriented to person, place and time.         Results Reviewed:  I have reviewed all laboratory data, with pertinent findings: Troponin within normal limits, proBNP elevated at 3298.  Glucose 446, CMP otherwise unremarkable.  TSH normal.  D-dimer 14.13.  INR 1.1.  PTT 27.5.  Hemoglobin 12.3, platelets 92.    CTA chest personally reviewed, my interpretation: Significant RV enlargement with bilateral distal right and distal left pulmonary artery embolism, high clot burden.    I have reviewed telemetry, which shows     I have visualized all the electrocardiograms performed, with my interpretations to follow: Sinus rhythm, T wave inversion in the precordial leads and lateral leads      Assessment / Plan        Active Hospital Problems    Diagnosis   • **Acute pulmonary embolism with acute cor pulmonale (CMS/HCC)   • HTN (hypertension), benign   • Controlled type 2 diabetes mellitus without complication, without long-term current use of insulin (CMS/HCC)     Recommendations and plans: Patient presents with submassive pulmonary embolism, with definite evidence of RV strain on CT.  He has received subcutaneous Lovenox, but I do think the benefit offered by catheter directed thrombolytic therapy outweighs the risk of this patient.  I discussed all risk, benefits and alternatives with the patient, and he agrees to proceed with procedural placement of EKOS catheters.  We will follow this with 12 hours of alteplase infusion  (1 mg/hr per catheter), and continue full anticoagulation with Lovenox.    Patient will be admitted under my care to the ICU at procedure.      Code Status: full     I discussed the patients findings and my recommendations with: patient    Estimated length of stay: ?    Raj Aldridge MD   01/15/21   15:38 CST

## 2021-01-15 NOTE — ED PROVIDER NOTES
Subjective   This patient is a 80-year-old gentleman with a history of diabetes and high blood pressure but no history of cancer or other risk factor for blood clotting.  Today had an episode of syncope where he went to the kitchen, felt dizzy and suddenly found himself on the floor.  He denies any chest pain, palpitations.  He did say that it was very sweaty and had some nausea.  He had a similar episode where he did not quite lose consciousness the day before.  He had no hemoptysis.  He had no unilateral leg swelling.  Today's also developed shortness of breath which is new for him.          Review of Systems   Respiratory: Positive for shortness of breath.    Neurological: Positive for dizziness and syncope.   All other systems reviewed and are negative.      Past Medical History:   Diagnosis Date   • Arthritis    • Diabetes (CMS/HCC)    • High blood pressure    • Hx of colonic polyp    • Sleep apnea        No Known Allergies    Past Surgical History:   Procedure Laterality Date   • COLONOSCOPY N/A 12/13/2018    Procedure: COLONOSCOPY WITH ANESTHESIA;  Surgeon: Lauri Bourne MD;  Location: [facility] ENDOSCOPY;  Service: Gastroenterology   • COLONOSCOPY W/ POLYPECTOMY  09/22/2015    3 Tubular adenomas Ileocecal valve and at 80 cm repeat exam in 3 years   • KNEE SURGERY         Family History   Problem Relation Age of Onset   • Diabetes Mother    • Colon cancer Neg Hx    • Colon polyps Neg Hx        Social History     Socioeconomic History   • Marital status:      Spouse name: Not on file   • Number of children: Not on file   • Years of education: Not on file   • Highest education level: Not on file   Tobacco Use   • Smoking status: Never Smoker   • Smokeless tobacco: Never Used   Substance and Sexual Activity   • Alcohol use: No           Objective   Physical Exam  Vitals signs and nursing note reviewed.   Constitutional:       Appearance: He is well-developed.   HENT:      Head: Normocephalic and  atraumatic.      Right Ear: External ear normal.      Left Ear: External ear normal.      Nose: Nose normal.   Eyes:      Conjunctiva/sclera: Conjunctivae normal.   Neck:      Musculoskeletal: Normal range of motion and neck supple.   Cardiovascular:      Rate and Rhythm: Normal rate and regular rhythm.      Heart sounds: Normal heart sounds.   Pulmonary:      Effort: Pulmonary effort is normal.      Breath sounds: Normal breath sounds.   Abdominal:      General: Bowel sounds are normal.      Palpations: Abdomen is soft.   Musculoskeletal: Normal range of motion.   Skin:     General: Skin is warm and dry.      Capillary Refill: Capillary refill takes less than 2 seconds.   Neurological:      Mental Status: He is alert and oriented to person, place, and time.   Psychiatric:         Behavior: Behavior normal.         Thought Content: Thought content normal.         Judgment: Judgment normal.         Procedures           ED Course                                           MDM  Number of Diagnoses or Management Options     Amount and/or Complexity of Data Reviewed  Clinical lab tests: reviewed and ordered  Tests in the radiology section of CPT®: reviewed and ordered  Tests in the medicine section of CPT®: reviewed and ordered    Patient Progress  Patient progress: stable      Final diagnoses:   Multiple subsegmental pulmonary emboli without acute cor pulmonale (CMS/HCC)     The patient's work-up revealed a large number of pulmonary emboli bilaterally with evidence of right heart strain.  I discussed the case with Dr. Buck who advised that EKOS was the best option for him and kindly admitted him under his care.  The patient's condition is currently guarded       Dc Moody MD  01/15/21 5638

## 2021-01-15 NOTE — PROGRESS NOTES
Critical lab result taken from Jayda in chem at 1407.  Glucose 446.  Repeated demos and result back.  Will update nurse. .14:09 CST   Dr Moody notified at 1410..14:11 CST  Nurse Doe notified of critical .14:16 CST

## 2021-01-16 PROBLEM — I48.92 ATRIAL FLUTTER (HCC): Status: ACTIVE | Noted: 2021-01-01

## 2021-01-16 PROBLEM — I48.0 PAROXYSMAL ATRIAL FIBRILLATION (HCC): Status: ACTIVE | Noted: 2021-01-01

## 2021-01-16 PROBLEM — J96.01 ACUTE RESPIRATORY FAILURE WITH HYPOXIA (HCC): Status: ACTIVE | Noted: 2021-01-01

## 2021-01-16 NOTE — PROCEDURES
Procedure  1.  Removal of EKOS catheters    Indication: Completion of TPA infusion    Procedural details: Sterile dressings were removed.  Saline infusion through lines was discontinued and EKOS catheters were then removed at the patient's bedside through the two existing right common femoral venous sheaths.  Sheaths were then removed and direct manual pressure applied for 5 minutes, achieving adequate hemostasis.    The patient tolerated the procedure well without sedation, and no complications were noted.    Impressions: successful removal of EKOS catheters and venous sheaths    Recommendations:  -Strict bedrest for 2 more hours, then can transfer out of ICU to telemetry floor  -Out of bed with assistance later today  -Transition to oral NOAC later today

## 2021-01-16 NOTE — PROGRESS NOTES
[facility] -  Progress Note     LOS: 1 day   Patient Care Team:  Johnson Phan MD as PCP - General  Johnson Phan MD as PCP - Family Medicine  Resabdirashid, Darin Valdes MD as Consulting Physician (Otolaryngology)  Johnson Phan MD as Referring Physician (Family Medicine)  Lauri Bourne MD as Consulting Physician (Gastroenterology)    Chief Complaint: f/u PE    Subjective     Interval History: Patient did well overnight; c/o right hip pain he attributes to having lie flat all night without sitting up. No associated SOA, CP, or palpitations.    Review of Systems:  Review of Systems   Constitution: Negative for malaise/fatigue.   Cardiovascular: Negative for chest pain, claudication, dyspnea on exertion, leg swelling, near-syncope, orthopnea, palpitations, paroxysmal nocturnal dyspnea and syncope.   Respiratory: Negative for shortness of breath.    Hematologic/Lymphatic: Does not bruise/bleed easily.   Musculoskeletal: Positive for back pain and joint pain.       Vital Sign Min/Max for last 24 hours  Temp  Min: 97.5 °F (36.4 °C)  Max: 98.3 °F (36.8 °C)   BP  Min: 96/62  Max: 137/76   Pulse  Min: 75  Max: 109   Resp  Min: 11  Max: 23   SpO2  Min: 91 %  Max: 97 %   No data recorded   Weight  Min: 118 kg (259 lb 11.2 oz)  Max: 118 kg (259 lb 11.2 oz)         01/15/21  1802   Weight: 118 kg (259 lb 11.2 oz)       Physical Exam:   Vitals signs and nursing note reviewed.   Constitutional:       General: Not in acute distress.  Neck:      Vascular: No JVD.   Pulmonary:      Effort: Pulmonary effort is normal.      Breath sounds: Normal breath sounds.   Cardiovascular:      Normal rate. Regular rhythm.      Murmurs: There is no murmur.      No gallop.   Pulses:     Intact distal pulses.   Edema:     Peripheral edema absent.   Abdominal:      Palpations: Abdomen is soft.      Tenderness: There is no abdominal tenderness.   Skin:     General: Skin is warm and dry.   Neurological:      Mental  Status: Alert and oriented to person, place, and time.         Medication Review: yes  Current Facility-Administered Medications   Medication Dose Route Frequency Provider Last Rate Last Admin   • acetaminophen (TYLENOL) tablet 650 mg  650 mg Oral Q6H PRN Raj Aldridge MD   650 mg at 01/16/21 0907   • alteplase (CATHFLO/ACTIVASE) 10 mg in sodium chloride 0.9 % 250 mL infusion  1 mg/hr Intracatheter Continuous Raj Aldridge MD 25 mL/hr at 01/16/21 0321 1 mg/hr at 01/16/21 0321   • alteplase (CATHFLO/ACTIVASE) 10 mg in sodium chloride 0.9 % 250 mL infusion  1 mg/hr Intracatheter Continuous Raj Aldridge MD 25 mL/hr at 01/16/21 0321 1 mg/hr at 01/16/21 0321   • dextrose (D50W) 25 g/ 50mL Intravenous Solution 25 g  25 g Intravenous Q15 Min PRN Raj Aldridge MD       • dextrose (GLUTOSE) oral gel 15 g  15 g Oral Q15 Min PRN Raj Aldridge MD       • enoxaparin (LOVENOX) syringe 120 mg  1 mg/kg Subcutaneous Q12H Raj Aldridge MD       • glucagon (human recombinant) (GLUCAGEN DIAGNOSTIC) injection 1 mg  1 mg Subcutaneous Q15 Min PRN Raj Aldridge MD       • insulin lispro (humaLOG, ADMELOG) injection 2-7 Units  2-7 Units Subcutaneous TID AC Raj Aldridge MD   3 Units at 01/16/21 1220   • temazepam (RESTORIL) capsule 7.5 mg  7.5 mg Oral Nightly PRN Raj Aldridge MD   7.5 mg at 01/15/21 2139         Results no anticoagulation with Lovenox, will transition to Xarelto tomorrow:   I have reviewed all laboratory data, with pertinent findings: Platelets remain around 90 K.  Hemoglobin stable at 10.3.  Renal function remains normal.    I have reviewed telemetry, which shows patient was in what appears to be atrial flutter with 2: 1 AV conduction, rate approximately 120 bpm, until around 4:30 AM this morning, converted to sinus rhythm has remained in sinus rhythm ever since      Assessment/Plan     1.  Acute pulmonary embolism with acute cor pulmonale (CMS/HCC) -now status post completion of TPA treatment  "via EKOS catheters.  Discussed patient made the bedside.  See separate note).  -Continue for anticoagulation with Lovenox, pending transition to Xarelto (PE dosing) tonight  -2 hours bedrest after removal of epistaxis this morning, then will have patient sit up in bed and ultimately out of bed later today with assistance  -Monitor closely in CCU for the remainder of the day and likely transfer to telemetry for tomorrow  -Lower extremity Duplex exam to look for DVT   -will need close PCP f/u after d/c to make sure all cancer screening are up-to-date, as this seems to be an \"unprovoked\" PE  -echo prior to d/c to reassess RV size, est RVSP    2.  Acute respiratory failure with hypoxia (CMS/HCC): Secondary to #1 above.  Still requiring 2 L nasal cannula.  -We will assess for oxygenation without nasal cannula supplementation prior to discharge    3.  Paroxysmal atrial fibrillation and atrial flutter (both have been observed during this admission): Unknown if purely secondary to #1 above, or if he has had this chronically but been unaware  -regardless, will be anticoagulated as per #1 above  -echo prior to d/c, as per above    4.   HTN (hypertension), benign: well controlled, continue home meds    5.  Controlled type 2 diabetes mellitus without complication, without long-term current use of insulin (CMS/HCC): not well controlled at home (A1c 9.4%)  -hold home PO meds while inpatient; fsbs qac/qhs and correciton insulin prn  -defer adjustment in home regimen to PCP as outpatient    6.  Moderate to severe hip pain: IV morphine x1; continue to monitor; should improve once bedrest is complete    MDM: high complexity    Raj Aldridge MD  01/16/21  13:30 CST          "

## 2021-01-16 NOTE — PLAN OF CARE
Problem: Fall Injury Risk  Goal: Absence of Fall and Fall-Related Injury  Outcome: Ongoing, Progressing  Intervention: Identify and Manage Contributors to Fall Injury Risk  Recent Flowsheet Documentation  Taken 1/15/2021 2200 by Torri Paulino RN  Medication Review/Management: medications reviewed  Taken 1/15/2021 2000 by Torri Paulino RN  Medication Review/Management: medications reviewed  Intervention: Promote Injury-Free Environment  Recent Flowsheet Documentation  Taken 1/16/2021 0000 by Torri Paulino RN  Safety Promotion/Fall Prevention:   assistive device/personal items within reach   clutter free environment maintained   fall prevention program maintained   room organization consistent   safety round/check completed   lighting adjusted  Taken 1/15/2021 2200 by Torri Paulino RN  Safety Promotion/Fall Prevention:   assistive device/personal items within reach   clutter free environment maintained   fall prevention program maintained   lighting adjusted   room organization consistent   safety round/check completed  Taken 1/15/2021 2000 by Torri Paulino RN  Safety Promotion/Fall Prevention:   assistive device/personal items within reach   clutter free environment maintained   fall prevention program maintained   room organization consistent   safety round/check completed     Problem: Adult Inpatient Plan of Care  Goal: Plan of Care Review  Outcome: Ongoing, Progressing  Goal: Patient-Specific Goal (Individualized)  Outcome: Ongoing, Progressing  Goal: Absence of Hospital-Acquired Illness or Injury  Outcome: Ongoing, Progressing  Intervention: Identify and Manage Fall Risk  Recent Flowsheet Documentation  Taken 1/16/2021 0000 by Torri Paulino RN  Safety Promotion/Fall Prevention:   assistive device/personal items within reach   clutter free environment maintained   fall prevention program maintained   room organization consistent   safety round/check completed   lighting adjusted  Taken  1/15/2021 2200 by Torri Paulino RN  Safety Promotion/Fall Prevention:   assistive device/personal items within reach   clutter free environment maintained   fall prevention program maintained   lighting adjusted   room organization consistent   safety round/check completed  Taken 1/15/2021 2000 by Torri Paulino RN  Safety Promotion/Fall Prevention:   assistive device/personal items within reach   clutter free environment maintained   fall prevention program maintained   room organization consistent   safety round/check completed  Intervention: Prevent Skin Injury  Recent Flowsheet Documentation  Taken 1/16/2021 0000 by Torri Paulino RN  Body Position:   turned   tilted, left  Taken 1/15/2021 2200 by Torri Paulino RN  Body Position:   turned   tilted, right  Taken 1/15/2021 2000 by Torri Paulino RN  Body Position:   turned   tilted, left  Intervention: Prevent Infection  Recent Flowsheet Documentation  Taken 1/16/2021 0000 by Torri Paulino RN  Infection Prevention:   environmental surveillance performed   rest/sleep promoted   single patient room provided  Taken 1/15/2021 2200 by Torri Paulino RN  Infection Prevention:   cohorting utilized   environmental surveillance performed   rest/sleep promoted   personal protective equipment utilized  Taken 1/15/2021 2100 by Torri Paulino RN  Infection Prevention:   environmental surveillance performed   rest/sleep promoted  Taken 1/15/2021 2000 by Torri Paulino RN  Infection Prevention:   cohorting utilized   environmental surveillance performed   equipment surfaces disinfected   personal protective equipment utilized   single patient room provided  Goal: Optimal Comfort and Wellbeing  Outcome: Ongoing, Progressing  Goal: Readiness for Transition of Care  Outcome: Ongoing, Progressing     Problem: Skin Injury Risk Increased  Goal: Skin Health and Integrity  Outcome: Ongoing, Progressing  Intervention: Optimize Skin Protection  Recent  Flowsheet Documentation  Taken 1/16/2021 0000 by Torri Paulino RN  Head of Bed (HOB):   HOB not elevated due to postsurgical restriction   HOB not elevated due to instrumentation present  Taken 1/15/2021 2200 by Torri Paulino RN  Head of Bed (HOB):   HOB not elevated due to postsurgical restriction   HOB not elevated due to instrumentation present  Taken 1/15/2021 2000 by Torri Paulino RN  Head of Bed (HOB):   HOB not elevated due to postsurgical restriction   HOB not elevated due to instrumentation present   Goal Outcome Evaluation:         Skin remains clean, dry and intact.  Patient turns Q2 hours, keeping right leg straight.  Patient voices c/o discomfort and stiffness.  Denies any further SOB, denies CP.  VS remain stable on 2.5L O2 via NC.

## 2021-01-17 NOTE — PROGRESS NOTES
[facility] -  Progress Note     LOS: 2 days   Patient Care Team:  Johnson Phan MD as PCP - General  Johnson Phan MD as PCP - Family Medicine  Amber, Darin Valdes MD as Consulting Physician (Otolaryngology)  Johnson Phan MD as Referring Physician (Family Medicine)  Lauri Bourne MD as Consulting Physician (Gastroenterology)    Chief Complaint: Follow-up PE    Subjective     Interval History:     Patient Complaints: The patient is sitting up in the bedside chair on room air, resting comfortably.  He denies any chest pain, shortness of breath or palpitations.  Review of telemetry reveals normal sinus rhythm with heart rates in the 70s to low 100s.  There was some concern for urinary retention overnight, but the patient reports he has urinated at least twice this morning.         Review of Systems:     Review of Systems   Constitutional: Negative for diaphoresis, fatigue, fever and unexpected weight change.   HENT: Negative for nosebleeds.    Respiratory: Negative for apnea, cough, chest tightness, shortness of breath and wheezing.    Cardiovascular: Negative for chest pain, palpitations and leg swelling.   Gastrointestinal: Negative for abdominal distention, nausea and vomiting.   Genitourinary: Negative for hematuria.   Musculoskeletal: Negative for gait problem.        Joint pain    Skin: Negative for color change.   Neurological: Negative for dizziness, syncope, weakness and light-headedness.     Objective     Vital Sign Min/Max for last 24 hours  Temp  Min: 97.6 °F (36.4 °C)  Max: 98.3 °F (36.8 °C)   BP  Min: 115/60  Max: 154/73   Pulse  Min: 72  Max: 89   Resp  Min: 18  Max: 20   SpO2  Min: 94 %  Max: 98 %   No data recorded   Weight  Min: 119 kg (262 lb)  Max: 119 kg (262 lb)         01/16/21 1930   Weight: 119 kg (262 lb)       Physical Exam:    Constitutional:       General: Not in acute distress.     Appearance: Well-developed. Not diaphoretic.   Eyes:      Pupils:  Pupils are equal, round, and reactive to light.   HENT:      Head: Normocephalic and atraumatic.   Neck:      Musculoskeletal: Normal range of motion and neck supple.      Vascular: No JVD.   Pulmonary:      Effort: Pulmonary effort is normal. No respiratory distress.      Breath sounds: Normal breath sounds.   Chest:      Chest wall: Not tender to palpatation.   Cardiovascular:      Normal rate. Regular rhythm.      Murmurs: There is no murmur.      Comments: Right groin access site CDI- gauze bandage in place   Edema:     Peripheral edema (2-3+ BLE edema ) present.  Abdominal:      General: Bowel sounds are normal. There is no distension.      Palpations: Abdomen is soft.      Tenderness: There is no abdominal tenderness.   Musculoskeletal: Normal range of motion.   Skin:     General: Skin is warm and dry.   Neurological:      Mental Status: Alert and oriented to person, place, and time.      Cranial Nerves: No cranial nerve deficit.   Psychiatric:         Behavior: Behavior normal.       Results Review:   Lab Results (last 72 hours)     Procedure Component Value Units Date/Time    POC Glucose Once [[id number]]  (Abnormal) Collected: 01/17/21 0758    Specimen: Blood Updated: 01/17/21 0820     Glucose 260 mg/dL      Comment: : [id number] Langley HeatherMeter ID: [id number]       Basic Metabolic Panel [[id number]]  (Abnormal) Collected: 01/17/21 0408    Specimen: Blood Updated: 01/17/21 0459     Glucose 187 mg/dL      BUN 12 mg/dL      Creatinine 0.81 mg/dL      Sodium 143 mmol/L      Potassium 3.5 mmol/L      Chloride 110 mmol/L      CO2 26.0 mmol/L      Calcium 8.3 mg/dL      eGFR Non African Amer 92 mL/min/1.73      BUN/Creatinine Ratio 14.8     Anion Gap 7.0 mmol/L     Narrative:      GFR Normal >60  Chronic Kidney Disease <60  Kidney Failure <15      CBC (No Diff) [[id number]]  (Abnormal) Collected: 01/17/21 0408    Specimen: Blood Updated: 01/17/21 0445     WBC 6.53 10*3/mm3      RBC 3.24 10*6/mm3       Hemoglobin 10.1 g/dL      Hematocrit 28.2 %      MCV 87.0 fL      MCH 31.2 pg      MCHC 35.8 g/dL      RDW 13.8 %      RDW-SD 42.8 fl      MPV 10.7 fL      Platelets 99 10*3/mm3     POC Glucose Once [[id number]]  (Abnormal) Collected: 01/16/21 2009    Specimen: Blood Updated: 01/16/21 2021     Glucose 280 mg/dL      Comment: : [id number] David LazaroandraMeter ID: [id number]       POC Glucose Once [[id number]]  (Abnormal) Collected: 01/16/21 1630    Specimen: Blood Updated: 01/16/21 1642     Glucose 270 mg/dL      Comment: : [id number] Ponce Carterlis  JMeter ID: [id number]       POC Glucose Once [[id number]]  (Abnormal) Collected: 01/16/21 1103    Specimen: Blood Updated: 01/16/21 1125     Glucose 208 mg/dL      Comment: : [id number] Ponce Tara  JMeter ID: [id number]       POC Glucose Once [[id number]]  (Abnormal) Collected: 01/16/21 0715    Specimen: Blood Updated: 01/16/21 0727     Glucose 160 mg/dL      Comment: : [id number] Ponce Carterlis  JMeter ID: [id number]       Protime-INR [[id number]]  (Abnormal) Collected: 01/16/21 0643    Specimen: Blood Updated: 01/16/21 0700     Protime 15.0 Seconds      INR 1.22    aPTT [[id number]]  (Normal) Collected: 01/16/21 0643    Specimen: Blood Updated: 01/16/21 0700     PTT 31.5 seconds     Fibrinogen [[id number]]  (Normal) Collected: 01/16/21 0643    Specimen: Blood Updated: 01/16/21 0700     Fibrinogen 288 mg/dL     CBC & Differential [[id number]]  (Abnormal) Collected: 01/16/21 0643    Specimen: Blood Updated: 01/16/21 0656    Narrative:      The following orders were created for panel order CBC & Differential.  Procedure                               Abnormality         Status                     ---------                               -----------         ------                     CBC Auto Differential[[id number]]        Abnormal            Final result                 Please view results for these tests on the individual orders.    CBC Auto Differential  [[id number]]  (Abnormal) Collected: 01/16/21 0643    Specimen: Blood Updated: 01/16/21 0656     WBC 6.59 10*3/mm3      RBC 3.29 10*6/mm3      Hemoglobin 10.3 g/dL      Hematocrit 28.3 %      MCV 86.0 fL      MCH 31.3 pg      MCHC 36.4 g/dL      RDW 13.8 %      RDW-SD 42.7 fl      MPV 10.6 fL      Platelets 89 10*3/mm3      Neutrophil % 68.8 %      Lymphocyte % 22.3 %      Monocyte % 6.2 %      Eosinophil % 2.0 %      Basophil % 0.5 %      Immature Grans % 0.2 %      Neutrophils, Absolute 4.54 10*3/mm3      Lymphocytes, Absolute 1.47 10*3/mm3      Monocytes, Absolute 0.41 10*3/mm3      Eosinophils, Absolute 0.13 10*3/mm3      Basophils, Absolute 0.03 10*3/mm3      Immature Grans, Absolute 0.01 10*3/mm3      nRBC 0.0 /100 WBC     Hemoglobin A1c [[id number]]  (Abnormal) Collected: 01/16/21 0008    Specimen: Blood Updated: 01/16/21 0043     Hemoglobin A1C 9.40 %     Narrative:      Hemoglobin A1C Ranges:    Increased Risk for Diabetes  5.7% to 6.4%  Diabetes                     >= 6.5%  Diabetic Goal                < 7.0%    Basic Metabolic Panel [[id number]]  (Abnormal) Collected: 01/16/21 0008    Specimen: Blood Updated: 01/16/21 0030     Glucose 195 mg/dL      BUN 18 mg/dL      Creatinine 0.88 mg/dL      Sodium 140 mmol/L      Potassium 3.4 mmol/L      Chloride 107 mmol/L      CO2 25.0 mmol/L      Calcium 8.6 mg/dL      eGFR Non African Amer 84 mL/min/1.73      BUN/Creatinine Ratio 20.5     Anion Gap 8.0 mmol/L     Narrative:      GFR Normal >60  Chronic Kidney Disease <60  Kidney Failure <15      Protime-INR [[id number]]  (Abnormal) Collected: 01/15/21 2354    Specimen: Blood Updated: 01/16/21 0023     Protime 15.3 Seconds      INR 1.25    aPTT [[id number]]  (Abnormal) Collected: 01/15/21 2354    Specimen: Blood Updated: 01/16/21 0023     PTT 35.1 seconds     Fibrinogen [[id number]]  (Normal) Collected: 01/15/21 3514    Specimen: Blood Updated: 01/16/21 0023     Fibrinogen 332 mg/dL     CBC & Differential [[id number]]   (Abnormal) Collected: 01/15/21 2354    Specimen: Blood Updated: 01/16/21 0016    Narrative:      The following orders were created for panel order CBC & Differential.  Procedure                               Abnormality         Status                     ---------                               -----------         ------                     CBC Auto Differential[[id number]]        Abnormal            Final result                 Please view results for these tests on the individual orders.    CBC Auto Differential [[id number]]  (Abnormal) Collected: 01/15/21 2354    Specimen: Blood Updated: 01/16/21 0016     WBC 6.77 10*3/mm3      RBC 3.66 10*6/mm3      Hemoglobin 11.0 g/dL      Hematocrit 31.6 %      MCV 86.3 fL      MCH 30.1 pg      MCHC 34.8 g/dL      RDW 13.9 %      RDW-SD 42.7 fl      MPV 10.5 fL      Platelets 92 10*3/mm3      Neutrophil % 67.8 %      Lymphocyte % 25.1 %      Monocyte % 5.9 %      Eosinophil % 0.6 %      Basophil % 0.3 %      Immature Grans % 0.3 %      Neutrophils, Absolute 4.59 10*3/mm3      Lymphocytes, Absolute 1.70 10*3/mm3      Monocytes, Absolute 0.40 10*3/mm3      Eosinophils, Absolute 0.04 10*3/mm3      Basophils, Absolute 0.02 10*3/mm3      Immature Grans, Absolute 0.02 10*3/mm3      nRBC 0.0 /100 WBC     POC Glucose Once [[id number]]  (Abnormal) Collected: 01/15/21 2142    Specimen: Blood Updated: 01/15/21 2154     Glucose 228 mg/dL      Comment: : RODNEY Hutton ID: [id number]       POC Glucose Once [[id number]]  (Abnormal) Collected: 01/15/21 1815    Specimen: Blood Updated: 01/15/21 1826     Glucose 373 mg/dL      Comment: : [id number] AlexandreyMeter ID: [id number]       Urinalysis, Microscopic Only - Urine, Clean Catch [[id number]]  (Abnormal) Collected: 01/15/21 1501    Specimen: Urine, Clean Catch Updated: 01/15/21 1533     RBC, UA None Seen /HPF      WBC, UA 0-2 /HPF      Bacteria, UA None Seen /HPF      Squamous Epithelial Cells, UA 0-2 /HPF       Hyaline Casts, UA None Seen /LPF      Methodology Manual Light Microscopy    Troponin [[id number]]  (Normal) Collected: 01/15/21 1501    Specimen: Blood Updated: 01/15/21 1532     Troponin T 0.018 ng/mL     Narrative:      Troponin T Reference Range:  <= 0.03 ng/mL-   Negative for AMI  >0.03 ng/mL-     Abnormal for myocardial necrosis.  Clinicians would have to utilize clinical acumen, EKG, Troponin and serial changes to determine if it is an Acute Myocardial Infarction or myocardial injury due to an underlying chronic condition.       Results may be falsely decreased if patient taking Biotin.      Urinalysis With Culture If Indicated - Urine, Clean Catch [[id number]]  (Abnormal) Collected: 01/15/21 1501    Specimen: Urine, Clean Catch Updated: 01/15/21 1530     Color, UA Yellow     Appearance, UA Cloudy     pH, UA <=5.0     Specific Gravity, UA >1.030     Glucose, UA >=1000 mg/dL (3+)     Ketones, UA 15 mg/dL (1+)     Bilirubin, UA Negative     Blood, UA Negative     Protein, UA 30 mg/dL (1+)     Leuk Esterase, UA Negative     Nitrite, UA Negative     Urobilinogen, UA 1.0 E.U./dL    COVID PRE-OP / PRE-PROCEDURE SCREENING ORDER (NO ISOLATION) - Swab, Nasal Cavity [[id number]]  (Normal) Collected: 01/15/21 1319    Specimen: Swab from Nasal Cavity Updated: 01/15/21 1444    Narrative:      The following orders were created for panel order COVID PRE-OP / PRE-PROCEDURE SCREENING ORDER (NO ISOLATION) - Swab, Nasal Cavity.  Procedure                               Abnormality         Status                     ---------                               -----------         ------                     COVID-19,[employer] IN-YANN...[[id number]]  Normal              Final result                 Please view results for these tests on the individual orders.    COVID-19,[employer] IN-HOUSE,Nasal Swab No Transport Media 3-4 HR TAT - Swab, Nasal Cavity [[id number]]  (Normal) Collected: 01/15/21 1319    Specimen: Swab from Nasal Cavity Updated:  01/15/21 1444     COVID19 Not Detected    Narrative:      Fact sheet for providers: https://www.fda.gov/media/053681/download     Fact sheet for patients: https://www.fda.gov/media/316066/download    Test performed by PCR.    Consider negative results in combination with clinical observations, patient history, and epidemiological information.  Fact sheet for providers: https://www.fda.gov/media/574384/download     Fact sheet for patients: https://www.fda.gov/media/217256/download    Test performed by PCR.    Consider negative results in combination with clinical observations, patient history, and epidemiological information.    TSH [[id number]]  (Normal) Collected: 01/15/21 1319    Specimen: Blood Updated: 01/15/21 1442     TSH 1.310 uIU/mL     CBC & Differential [[id number]]  (Abnormal) Collected: 01/15/21 1319    Specimen: Blood Updated: 01/15/21 1416    Narrative:      The following orders were created for panel order CBC & Differential.  Procedure                               Abnormality         Status                     ---------                               -----------         ------                     CBC Auto Differential[[id number]]        Abnormal            Final result                 Please view results for these tests on the individual orders.    CBC Auto Differential [[id number]]  (Abnormal) Collected: 01/15/21 1319    Specimen: Blood Updated: 01/15/21 1416     WBC 7.69 10*3/mm3      RBC 3.94 10*6/mm3      Hemoglobin 12.3 g/dL      Hematocrit 33.8 %      MCV 85.8 fL      MCH 31.2 pg      MCHC 36.4 g/dL      RDW 13.8 %      RDW-SD 42.9 fl      MPV 10.6 fL      Platelets 92 10*3/mm3      Neutrophil % 81.1 %      Lymphocyte % 13.4 %      Monocyte % 3.9 %      Eosinophil % 0.7 %      Basophil % 0.5 %      Immature Grans % 0.4 %      Neutrophils, Absolute 6.24 10*3/mm3      Lymphocytes, Absolute 1.03 10*3/mm3      Monocytes, Absolute 0.30 10*3/mm3      Eosinophils, Absolute 0.05 10*3/mm3      Basophils,  Absolute 0.04 10*3/mm3      Immature Grans, Absolute 0.03 10*3/mm3      nRBC 0.0 /100 WBC     Comprehensive Metabolic Panel [[id number]]  (Abnormal) Collected: 01/15/21 1319    Specimen: Blood Updated: 01/15/21 1408     Glucose 446 mg/dL      BUN 22 mg/dL      Creatinine 1.01 mg/dL      Sodium 140 mmol/L      Potassium 4.4 mmol/L      Comment: Slight hemolysis detected by analyzer. Results may be affected.        Chloride 100 mmol/L      CO2 26.0 mmol/L      Calcium 9.1 mg/dL      Total Protein 6.4 g/dL      Albumin 3.90 g/dL      ALT (SGPT) 13 U/L      AST (SGOT) 21 U/L      Comment: Slight hemolysis detected by analyzer. Results may be affected.        Alkaline Phosphatase 115 U/L      Total Bilirubin 0.6 mg/dL      eGFR Non African Amer 71 mL/min/1.73      Globulin 2.5 gm/dL      A/G Ratio 1.6 g/dL      BUN/Creatinine Ratio 21.8     Anion Gap 14.0 mmol/L     Narrative:      GFR Normal >60  Chronic Kidney Disease <60  Kidney Failure <15      aPTT [[id number]]  (Normal) Collected: 01/15/21 1319    Specimen: Blood Updated: 01/15/21 1402     PTT 27.5 seconds     Protime-INR [[id number]]  (Normal) Collected: 01/15/21 1319    Specimen: Blood Updated: 01/15/21 1402     Protime 13.6 Seconds      INR 1.08    D-dimer, Quantitative [[id number]]  (Abnormal) Collected: 01/15/21 1319    Specimen: Blood Updated: 01/15/21 1402     D-Dimer, Quantitative 14.13 mg/L (FEU)     Narrative:      Reference Range is 0-0.50 mg/L FEU. However, results <0.50 mg/L FEU tends to rule out DVT or PE. Results >0.50 mg/L FEU are not useful in predicting absence or presence of DVT or PE.      Troponin [[id number]]  (Normal) Collected: 01/15/21 1319    Specimen: Blood Updated: 01/15/21 1351     Troponin T 0.015 ng/mL     Narrative:      Troponin T Reference Range:  <= 0.03 ng/mL-   Negative for AMI  >0.03 ng/mL-     Abnormal for myocardial necrosis.  Clinicians would have to utilize clinical acumen, EKG, Troponin and serial changes to determine if it is  an Acute Myocardial Infarction or myocardial injury due to an underlying chronic condition.       Results may be falsely decreased if patient taking Biotin.      BNP [[id number]]  (Abnormal) Collected: 01/15/21 1319    Specimen: Blood Updated: 01/15/21 1350     proBNP 3,298.0 pg/mL     Narrative:      Among patients with dyspnea, NT-proBNP is highly sensitive for the detection of acute congestive heart failure. In addition NT-proBNP of <300 pg/ml effectively rules out acute congestive heart failure with 99% negative predictive value.    Results may be falsely decreased if patient taking Biotin.                Echo EF Estimated  No results found for: ECHOEFEST      Cath Ejection Fraction Quantitative  No results found for: CATHEF        Medication Review: yes  Current Facility-Administered Medications   Medication Dose Route Frequency Provider Last Rate Last Admin   • acetaminophen (TYLENOL) tablet 650 mg  650 mg Oral Q6H PRN Raj Aldridge MD   650 mg at 01/16/21 0907   • allopurinol (ZYLOPRIM) tablet 300 mg  300 mg Oral Daily Raj Aldridge MD   300 mg at 01/17/21 0907   • atorvastatin (LIPITOR) tablet 20 mg  20 mg Oral Daily Raj Aldridge MD   20 mg at 01/17/21 0907   • dextrose (D50W) 25 g/ 50mL Intravenous Solution 25 g  25 g Intravenous Q15 Min PRN Raj Aldridge MD       • dextrose (GLUTOSE) oral gel 15 g  15 g Oral Q15 Min PRN Raj Aldridge MD       • Diclofenac Sodium (VOLTAREN) 1 % gel 4 g  4 g Topical 4x Daily PRN Raj Aldridge MD       • doxepin (SINEquan) capsule 10 mg  10 mg Oral Daily Raj Aldridge MD   10 mg at 01/17/21 0907   • glucagon (human recombinant) (GLUCAGEN DIAGNOSTIC) injection 1 mg  1 mg Subcutaneous Q15 Min PRN Raj Aldridge MD       • insulin lispro (humaLOG, ADMELOG) injection 2-7 Units  2-7 Units Subcutaneous TID AC Raj Aldridge MD   4 Units at 01/17/21 0907   • losartan (COZAAR) tablet 100 mg  100 mg Oral Q24H Raj Aldridge MD   100 mg at 01/17/21 0907   •  "pentoxifylline (TRENtal) CR tablet 400 mg  400 mg Oral BID With Meals Raj Aldridge MD   400 mg at 01/17/21 0907   • rivaroxaban (XARELTO) tablet 15 mg  15 mg Oral BID With Meals Raj Aldridge MD   15 mg at 01/17/21 0907   • temazepam (RESTORIL) capsule 7.5 mg  7.5 mg Oral Nightly PRN Raj Aldridge MD   7.5 mg at 01/16/21 2044   • terazosin (HYTRIN) capsule 5 mg  5 mg Oral Q12H Raj Aldridge MD   5 mg at 01/17/21 0907         Assessment/Plan     1.  Acute pulmonary embolism with acute cor pulmonale (CMS/HCC) -now status post completion of TPA treatment via EKOS catheters.    -Continue for anticoagulation Xarelto (PE dosing)  -Ambulate patient in halls with nursing assistance-discussed with patient's nurse  -Lower extremity Duplex exam to look for DVT   -will need close PCP f/u after d/c to make sure all cancer screening are up-to-date, as this seems to be an \"unprovoked\" PE  -echo prior to d/c to reassess RV size, est RVSP-echo ordered     2.  Acute respiratory failure with hypoxia (CMS/HCC): Secondary to #1 above.   -Now resolved, no longer requiring oxygen.  Oxygen saturation in the mid 90s on room air.     3.  Paroxysmal atrial fibrillation and atrial flutter (both have been observed during this admission): Unknown if purely secondary to #1 above, or if he has had this chronically but been unaware  -regardless, will be anticoagulated as per #1 above  -echo prior to d/c, as per above  -Now maintaining normal sinus rhythm     4.   HTN (hypertension), benign: well controlled, continue home meds  -Continue losartan     5.  Controlled type 2 diabetes mellitus without complication, without long-term current use of insulin (CMS/HCC): not well controlled at home (A1c 9.4%)  -hold home PO meds while inpatient; fsbs qac/qhs and correciton insulin prn  -defer adjustment in home regimen to PCP as outpatient     6.  Moderate to severe hip pain: Morphine given yesterday.  No complaints today.    Probable discharge " home tomorrow.    Jayda Mahmood, APRN  01/17/21  11:00 CST

## 2021-01-17 NOTE — PLAN OF CARE
Goal Outcome Evaluation:  Plan of Care Reviewed With: patient  Progress: no change  Outcome Summary: VSS. Denies pain this shift. Right groin C/D/I, TIMO. PPP. Pt remains edematous in BLE. Needs echo and US venous doppler done. NSR/SA  on tele with [facility] PAC/PVC. Pt did have a run of MAT @ 167. EKG done. Dr. Aldridge aware. Walked pt in hallway and tolerated well. Safety maintained. Will cont to monitor and call MD with any changes.

## 2021-01-17 NOTE — PLAN OF CARE
Goal Outcome Evaluation:  Plan of Care Reviewed With: patient  Progress: no change  Outcome Summary: pt came to floor from unit just before change of shift; no c/o pain; right groin soft and nontender with gauze and teg intact; sinus 71-91; VSS; safety maintained; will continue to monitor

## 2021-01-18 NOTE — PLAN OF CARE
Goal Outcome Evaluation:  Plan of Care Reviewed With: patient  Progress: no change  Outcome Summary: pt has had no c/o pain this shift; he is to have an echo and BLE venous doppler done later today; he is hoping to go home after his tests; pt has rested well through the night; VSS; safety maintained; will continue to monitor

## 2021-01-18 NOTE — PROGRESS NOTES
Discharge Planning Assessment  [facility]     Patient Name: Dayday Andrews  MRN: [id number]  Today's Date: 1/18/2021    Admit Date: 1/15/2021    Discharge Needs Assessment     Row Name 01/18/21 0930       Living Environment    Lives With  spouse    Name(s) of Who Lives With Patient  Leydi    Current Living Arrangements  home/apartment/condo    Primary Care Provided by  self    Provides Primary Care For  no one    Family Caregiver if Needed  spouse    Quality of Family Relationships  helpful;involved;supportive    Able to Return to Prior Arrangements  yes       Resource/Environmental Concerns    Resource/Environmental Concerns  none       Transition Planning    Patient/Family Anticipates Transition to  home with family    Patient/Family Anticipated Services at Transition  none    Transportation Anticipated  family or friend will provide       Discharge Needs Assessment    Readmission Within the Last 30 Days  no previous admission in last 30 days    Equipment Currently Used at Home  cane, straight;walker, rolling    Concerns to be Addressed  no discharge needs identified    Anticipated Changes Related to Illness  none    Equipment Needed After Discharge  none    Discharge Coordination/Progress  Pt has PCP and RX coverage.  Pt can afford medications.  Pt denies any dc needs.        Discharge Plan    No documentation.       Continued Care and Services - Admitted Since 1/15/2021    Coordination has not been started for this encounter.         Demographic Summary    No documentation.       Functional Status    No documentation.       Psychosocial    No documentation.       Abuse/Neglect    No documentation.       Legal    No documentation.       Substance Abuse    No documentation.       Patient Forms    No documentation.           YUNIOR Franklin

## 2021-01-18 NOTE — PLAN OF CARE
Goal Outcome Evaluation:  Plan of Care Reviewed With: patient  Progress: no change  Outcome Summary: VSS, except HR got up to 150-160s when walking. Pt SOA during this. EKG done, pt placed back in bed and HR stabled. Beta blocker restarted per cardiology. Pt c/o constipation, Colace and Miralax given. Echo and venous doppler done today, see results. Safety maintained.

## 2021-01-18 NOTE — PROGRESS NOTES
[facility] -  Progress Note     LOS: 3 days   Patient Care Team:  Johnson Phan MD as PCP - General  Johnson Phan MD as PCP - Family Medicine  Amber, Darin Valdes MD as Consulting Physician (Otolaryngology)  Johnson Phan MD as Referring Physician (Family Medicine)  Lauri Bourne MD as Consulting Physician (Gastroenterology)    Chief Complaint: Follow-up PE    Subjective     Interval History:     Patient Complaints: At the time of my exam, the patient was just getting back to bed after being up to the bathroom.  He reported some increased shortness of breath at that time.  This resolved pretty quickly with rest.  He denied any chest pain or palpitations.  He states he had been up trying to have a bowel movement.    Today he has had at least 2 separate runs of what appears to be PAT for approximately 10 to 15 minutes.  One event occurred this morning when up trying to use the restroom.  Another, reportedly started at rest according to the nurse and persisted when he got up to the bedside commode.  Heart rates reached 160s to 180s with these episodes.  He denied any presyncope.  He was not syncopal.            Review of Systems:     Review of Systems   Constitutional: Negative for diaphoresis, fatigue, fever and unexpected weight change.   HENT: Negative for nosebleeds.    Respiratory: Positive for shortness of breath. Negative for apnea, cough, chest tightness and wheezing.    Cardiovascular: Negative for chest pain, palpitations and leg swelling.   Gastrointestinal: Negative for abdominal distention, nausea and vomiting.   Genitourinary: Negative for hematuria.   Musculoskeletal: Negative for gait problem.        Joint pain    Skin: Negative for color change.   Neurological: Negative for dizziness, syncope, weakness and light-headedness.     Objective     Vital Sign Min/Max for last 24 hours  Temp  Min: 97.5 °F (36.4 °C)  Max: 99.4 °F (37.4 °C)   BP  Min: 112/48  Max: 143/69    Pulse  Min: 79  Max: 117   Resp  Min: 16  Max: 18   SpO2  Min: 94 %  Max: 98 %   No data recorded   Weight  Min: 118 kg (260 lb)  Max: 118 kg (260 lb 6 oz)         01/18/21  1116   Weight: 118 kg (260 lb)       Physical Exam:    Constitutional:       General: Not in acute distress.     Appearance: Well-developed. Not diaphoretic.   Eyes:      Pupils: Pupils are equal, round, and reactive to light.   HENT:      Head: Normocephalic and atraumatic.   Neck:      Musculoskeletal: Normal range of motion and neck supple.      Vascular: No JVD.   Pulmonary:      Effort: Pulmonary effort is normal. No respiratory distress.      Breath sounds: Normal breath sounds.   Chest:      Chest wall: Not tender to palpatation.   Cardiovascular:      Normal rate. Regular rhythm.      Murmurs: There is no murmur.      Comments: Right groin access site CDI- gauze bandage in place   Edema:     Peripheral edema (2-3+ BLE edema ) present.  Abdominal:      General: Bowel sounds are normal. There is no distension.      Palpations: Abdomen is soft.      Tenderness: There is no abdominal tenderness.   Musculoskeletal: Normal range of motion.   Skin:     General: Skin is warm and dry.   Neurological:      Mental Status: Alert and oriented to person, place, and time.      Cranial Nerves: No cranial nerve deficit.   Psychiatric:         Behavior: Behavior normal.       Results Review:   Lab Results (last 72 hours)     Procedure Component Value Units Date/Time    POC Glucose Once [[id number]]  (Abnormal) Collected: 01/17/21 0758    Specimen: Blood Updated: 01/17/21 0820     Glucose 260 mg/dL      Comment: : [id number] Langley HeatherMeter ID: [id number]       Basic Metabolic Panel [[id number]]  (Abnormal) Collected: 01/17/21 0408    Specimen: Blood Updated: 01/17/21 0459     Glucose 187 mg/dL      BUN 12 mg/dL      Creatinine 0.81 mg/dL      Sodium 143 mmol/L      Potassium 3.5 mmol/L      Chloride 110 mmol/L      CO2 26.0 mmol/L      Calcium 8.3  mg/dL      eGFR Non African Amer 92 mL/min/1.73      BUN/Creatinine Ratio 14.8     Anion Gap 7.0 mmol/L     Narrative:      GFR Normal >60  Chronic Kidney Disease <60  Kidney Failure <15      CBC (No Diff) [[id number]]  (Abnormal) Collected: 01/17/21 0408    Specimen: Blood Updated: 01/17/21 0445     WBC 6.53 10*3/mm3      RBC 3.24 10*6/mm3      Hemoglobin 10.1 g/dL      Hematocrit 28.2 %      MCV 87.0 fL      MCH 31.2 pg      MCHC 35.8 g/dL      RDW 13.8 %      RDW-SD 42.8 fl      MPV 10.7 fL      Platelets 99 10*3/mm3     POC Glucose Once [[id number]]  (Abnormal) Collected: 01/16/21 2009    Specimen: Blood Updated: 01/16/21 2021     Glucose 280 mg/dL      Comment: : [id number] Cesarkristen Bizratings.comandraMeter ID: [id number]       POC Glucose Once [[id number]]  (Abnormal) Collected: 01/16/21 1630    Specimen: Blood Updated: 01/16/21 1642     Glucose 270 mg/dL      Comment: : [id number] Ponce Carterlis  JMeter ID: [id number]       POC Glucose Once [[id number]]  (Abnormal) Collected: 01/16/21 1103    Specimen: Blood Updated: 01/16/21 1125     Glucose 208 mg/dL      Comment: : [id number] Ponce Carterlis  JMeter ID: [id number]       POC Glucose Once [[id number]]  (Abnormal) Collected: 01/16/21 0715    Specimen: Blood Updated: 01/16/21 0727     Glucose 160 mg/dL      Comment: : [id number] Ponce Carterlis  JMeter ID: [id number]       Protime-INR [[id number]]  (Abnormal) Collected: 01/16/21 0643    Specimen: Blood Updated: 01/16/21 0700     Protime 15.0 Seconds      INR 1.22    aPTT [[id number]]  (Normal) Collected: 01/16/21 0643    Specimen: Blood Updated: 01/16/21 0700     PTT 31.5 seconds     Fibrinogen [[id number]]  (Normal) Collected: 01/16/21 0643    Specimen: Blood Updated: 01/16/21 0700     Fibrinogen 288 mg/dL     CBC & Differential [[id number]]  (Abnormal) Collected: 01/16/21 0643    Specimen: Blood Updated: 01/16/21 0656    Narrative:      The following orders were created for panel order CBC & Differential.  Procedure                                Abnormality         Status                     ---------                               -----------         ------                     CBC Auto Differential[[id number]]        Abnormal            Final result                 Please view results for these tests on the individual orders.    CBC Auto Differential [[id number]]  (Abnormal) Collected: 01/16/21 0643    Specimen: Blood Updated: 01/16/21 0656     WBC 6.59 10*3/mm3      RBC 3.29 10*6/mm3      Hemoglobin 10.3 g/dL      Hematocrit 28.3 %      MCV 86.0 fL      MCH 31.3 pg      MCHC 36.4 g/dL      RDW 13.8 %      RDW-SD 42.7 fl      MPV 10.6 fL      Platelets 89 10*3/mm3      Neutrophil % 68.8 %      Lymphocyte % 22.3 %      Monocyte % 6.2 %      Eosinophil % 2.0 %      Basophil % 0.5 %      Immature Grans % 0.2 %      Neutrophils, Absolute 4.54 10*3/mm3      Lymphocytes, Absolute 1.47 10*3/mm3      Monocytes, Absolute 0.41 10*3/mm3      Eosinophils, Absolute 0.13 10*3/mm3      Basophils, Absolute 0.03 10*3/mm3      Immature Grans, Absolute 0.01 10*3/mm3      nRBC 0.0 /100 WBC     Hemoglobin A1c [[id number]]  (Abnormal) Collected: 01/16/21 0008    Specimen: Blood Updated: 01/16/21 0043     Hemoglobin A1C 9.40 %     Narrative:      Hemoglobin A1C Ranges:    Increased Risk for Diabetes  5.7% to 6.4%  Diabetes                     >= 6.5%  Diabetic Goal                < 7.0%    Basic Metabolic Panel [[id number]]  (Abnormal) Collected: 01/16/21 0008    Specimen: Blood Updated: 01/16/21 0030     Glucose 195 mg/dL      BUN 18 mg/dL      Creatinine 0.88 mg/dL      Sodium 140 mmol/L      Potassium 3.4 mmol/L      Chloride 107 mmol/L      CO2 25.0 mmol/L      Calcium 8.6 mg/dL      eGFR Non African Amer 84 mL/min/1.73      BUN/Creatinine Ratio 20.5     Anion Gap 8.0 mmol/L     Narrative:      GFR Normal >60  Chronic Kidney Disease <60  Kidney Failure <15      Protime-INR [[id number]]  (Abnormal) Collected: 01/15/21 8776    Specimen: Blood Updated:  01/16/21 0023     Protime 15.3 Seconds      INR 1.25    aPTT [[id number]]  (Abnormal) Collected: 01/15/21 2354    Specimen: Blood Updated: 01/16/21 0023     PTT 35.1 seconds     Fibrinogen [[id number]]  (Normal) Collected: 01/15/21 2354    Specimen: Blood Updated: 01/16/21 0023     Fibrinogen 332 mg/dL     CBC & Differential [[id number]]  (Abnormal) Collected: 01/15/21 2354    Specimen: Blood Updated: 01/16/21 0016    Narrative:      The following orders were created for panel order CBC & Differential.  Procedure                               Abnormality         Status                     ---------                               -----------         ------                     CBC Auto Differential[[id number]]        Abnormal            Final result                 Please view results for these tests on the individual orders.    CBC Auto Differential [[id number]]  (Abnormal) Collected: 01/15/21 2354    Specimen: Blood Updated: 01/16/21 0016     WBC 6.77 10*3/mm3      RBC 3.66 10*6/mm3      Hemoglobin 11.0 g/dL      Hematocrit 31.6 %      MCV 86.3 fL      MCH 30.1 pg      MCHC 34.8 g/dL      RDW 13.9 %      RDW-SD 42.7 fl      MPV 10.5 fL      Platelets 92 10*3/mm3      Neutrophil % 67.8 %      Lymphocyte % 25.1 %      Monocyte % 5.9 %      Eosinophil % 0.6 %      Basophil % 0.3 %      Immature Grans % 0.3 %      Neutrophils, Absolute 4.59 10*3/mm3      Lymphocytes, Absolute 1.70 10*3/mm3      Monocytes, Absolute 0.40 10*3/mm3      Eosinophils, Absolute 0.04 10*3/mm3      Basophils, Absolute 0.02 10*3/mm3      Immature Grans, Absolute 0.02 10*3/mm3      nRBC 0.0 /100 WBC     POC Glucose Once [[id number]]  (Abnormal) Collected: 01/15/21 2142    Specimen: Blood Updated: 01/15/21 2154     Glucose 228 mg/dL      Comment: : RODNEY Hutton ID: [id number]       POC Glucose Once [[id number]]  (Abnormal) Collected: 01/15/21 1815    Specimen: Blood Updated: 01/15/21 1826     Glucose 373 mg/dL      Comment:  : [id number] Beau MindyMeter ID: [id number]       Urinalysis, Microscopic Only - Urine, Clean Catch [[id number]]  (Abnormal) Collected: 01/15/21 1501    Specimen: Urine, Clean Catch Updated: 01/15/21 1533     RBC, UA None Seen /HPF      WBC, UA 0-2 /HPF      Bacteria, UA None Seen /HPF      Squamous Epithelial Cells, UA 0-2 /HPF      Hyaline Casts, UA None Seen /LPF      Methodology Manual Light Microscopy    Troponin [[id number]]  (Normal) Collected: 01/15/21 1501    Specimen: Blood Updated: 01/15/21 1532     Troponin T 0.018 ng/mL     Narrative:      Troponin T Reference Range:  <= 0.03 ng/mL-   Negative for AMI  >0.03 ng/mL-     Abnormal for myocardial necrosis.  Clinicians would have to utilize clinical acumen, EKG, Troponin and serial changes to determine if it is an Acute Myocardial Infarction or myocardial injury due to an underlying chronic condition.       Results may be falsely decreased if patient taking Biotin.      Urinalysis With Culture If Indicated - Urine, Clean Catch [[id number]]  (Abnormal) Collected: 01/15/21 1501    Specimen: Urine, Clean Catch Updated: 01/15/21 1530     Color, UA Yellow     Appearance, UA Cloudy     pH, UA <=5.0     Specific Gravity, UA >1.030     Glucose, UA >=1000 mg/dL (3+)     Ketones, UA 15 mg/dL (1+)     Bilirubin, UA Negative     Blood, UA Negative     Protein, UA 30 mg/dL (1+)     Leuk Esterase, UA Negative     Nitrite, UA Negative     Urobilinogen, UA 1.0 E.U./dL    COVID PRE-OP / PRE-PROCEDURE SCREENING ORDER (NO ISOLATION) - Swab, Nasal Cavity [[id number]]  (Normal) Collected: 01/15/21 1319    Specimen: Swab from Nasal Cavity Updated: 01/15/21 1444    Narrative:      The following orders were created for panel order COVID PRE-OP / PRE-PROCEDURE SCREENING ORDER (NO ISOLATION) - Swab, Nasal Cavity.  Procedure                               Abnormality         Status                     ---------                               -----------         ------                      COVID-19,[employer] [facility]ANN...[[id number]]  Normal              Final result                 Please view results for these tests on the individual orders.    COVID-19,[employer] IN-HOUSE,Nasal Swab No Transport Media 3-4 HR TAT - Swab, Nasal Cavity [[id number]]  (Normal) Collected: 01/15/21 1319    Specimen: Swab from Nasal Cavity Updated: 01/15/21 1444     COVID19 Not Detected    Narrative:      Fact sheet for providers: https://www.fda.gov/media/599142/download     Fact sheet for patients: https://www.fda.gov/media/105997/download    Test performed by PCR.    Consider negative results in combination with clinical observations, patient history, and epidemiological information.  Fact sheet for providers: https://www.fda.gov/media/160069/download     Fact sheet for patients: https://www.fda.gov/media/449601/download    Test performed by PCR.    Consider negative results in combination with clinical observations, patient history, and epidemiological information.    TSH [[id number]]  (Normal) Collected: 01/15/21 1319    Specimen: Blood Updated: 01/15/21 1442     TSH 1.310 uIU/mL     CBC & Differential [[id number]]  (Abnormal) Collected: 01/15/21 1319    Specimen: Blood Updated: 01/15/21 1416    Narrative:      The following orders were created for panel order CBC & Differential.  Procedure                               Abnormality         Status                     ---------                               -----------         ------                     CBC Auto Differential[[id number]]        Abnormal            Final result                 Please view results for these tests on the individual orders.    CBC Auto Differential [[id number]]  (Abnormal) Collected: 01/15/21 1319    Specimen: Blood Updated: 01/15/21 1416     WBC 7.69 10*3/mm3      RBC 3.94 10*6/mm3      Hemoglobin 12.3 g/dL      Hematocrit 33.8 %      MCV 85.8 fL      MCH 31.2 pg      MCHC 36.4 g/dL      RDW 13.8 %      RDW-SD 42.9 fl      MPV 10.6 fL      Platelets  92 10*3/mm3      Neutrophil % 81.1 %      Lymphocyte % 13.4 %      Monocyte % 3.9 %      Eosinophil % 0.7 %      Basophil % 0.5 %      Immature Grans % 0.4 %      Neutrophils, Absolute 6.24 10*3/mm3      Lymphocytes, Absolute 1.03 10*3/mm3      Monocytes, Absolute 0.30 10*3/mm3      Eosinophils, Absolute 0.05 10*3/mm3      Basophils, Absolute 0.04 10*3/mm3      Immature Grans, Absolute 0.03 10*3/mm3      nRBC 0.0 /100 WBC     Comprehensive Metabolic Panel [[id number]]  (Abnormal) Collected: 01/15/21 1319    Specimen: Blood Updated: 01/15/21 1408     Glucose 446 mg/dL      BUN 22 mg/dL      Creatinine 1.01 mg/dL      Sodium 140 mmol/L      Potassium 4.4 mmol/L      Comment: Slight hemolysis detected by analyzer. Results may be affected.        Chloride 100 mmol/L      CO2 26.0 mmol/L      Calcium 9.1 mg/dL      Total Protein 6.4 g/dL      Albumin 3.90 g/dL      ALT (SGPT) 13 U/L      AST (SGOT) 21 U/L      Comment: Slight hemolysis detected by analyzer. Results may be affected.        Alkaline Phosphatase 115 U/L      Total Bilirubin 0.6 mg/dL      eGFR Non African Amer 71 mL/min/1.73      Globulin 2.5 gm/dL      A/G Ratio 1.6 g/dL      BUN/Creatinine Ratio 21.8     Anion Gap 14.0 mmol/L     Narrative:      GFR Normal >60  Chronic Kidney Disease <60  Kidney Failure <15      aPTT [[id number]]  (Normal) Collected: 01/15/21 1319    Specimen: Blood Updated: 01/15/21 1402     PTT 27.5 seconds     Protime-INR [[id number]]  (Normal) Collected: 01/15/21 1319    Specimen: Blood Updated: 01/15/21 1402     Protime 13.6 Seconds      INR 1.08    D-dimer, Quantitative [[id number]]  (Abnormal) Collected: 01/15/21 1319    Specimen: Blood Updated: 01/15/21 1402     D-Dimer, Quantitative 14.13 mg/L (FEU)     Narrative:      Reference Range is 0-0.50 mg/L FEU. However, results <0.50 mg/L FEU tends to rule out DVT or PE. Results >0.50 mg/L FEU are not useful in predicting absence or presence of DVT or PE.      Troponin [[id number]]  (Normal)  Collected: 01/15/21 1319    Specimen: Blood Updated: 01/15/21 1351     Troponin T 0.015 ng/mL     Narrative:      Troponin T Reference Range:  <= 0.03 ng/mL-   Negative for AMI  >0.03 ng/mL-     Abnormal for myocardial necrosis.  Clinicians would have to utilize clinical acumen, EKG, Troponin and serial changes to determine if it is an Acute Myocardial Infarction or myocardial injury due to an underlying chronic condition.       Results may be falsely decreased if patient taking Biotin.      BNP [[id number]]  (Abnormal) Collected: 01/15/21 1319    Specimen: Blood Updated: 01/15/21 1350     proBNP 3,298.0 pg/mL     Narrative:      Among patients with dyspnea, NT-proBNP is highly sensitive for the detection of acute congestive heart failure. In addition NT-proBNP of <300 pg/ml effectively rules out acute congestive heart failure with 99% negative predictive value.    Results may be falsely decreased if patient taking Biotin.                Echo EF Estimated  No results found for: ECHOEFEST      Cath Ejection Fraction Quantitative  No results found for: CATHEF        Medication Review: yes  Current Facility-Administered Medications   Medication Dose Route Frequency Provider Last Rate Last Admin   • acetaminophen (TYLENOL) tablet 650 mg  650 mg Oral Q6H PRN Raj Aldridge MD   650 mg at 01/16/21 0907   • allopurinol (ZYLOPRIM) tablet 300 mg  300 mg Oral Daily Raj Aldridge MD   300 mg at 01/18/21 0820   • atorvastatin (LIPITOR) tablet 20 mg  20 mg Oral Daily Raj Aldridge MD   20 mg at 01/18/21 0820   • bisoprolol (ZEBeta) tablet 10 mg  10 mg Oral Q24H Jayda Mahmood APRN   10 mg at 01/18/21 1546   • dextrose (D50W) 25 g/ 50mL Intravenous Solution 25 g  25 g Intravenous Q15 Min PRN Raj Aldridge MD       • dextrose (GLUTOSE) oral gel 15 g  15 g Oral Q15 Min PRN Raj Aldridge MD       • Diclofenac Sodium (VOLTAREN) 1 % gel 4 g  4 g Topical 4x Daily PRN Raj Aldridge MD       • docusate sodium (COLACE)  capsule 100 mg  100 mg Oral BID Knees, Jayda E, APRN   100 mg at 01/18/21 1040   • doxepin (SINEquan) capsule 10 mg  10 mg Oral Daily Raj Aldridge MD   10 mg at 01/18/21 0820   • glucagon (human recombinant) (GLUCAGEN DIAGNOSTIC) injection 1 mg  1 mg Subcutaneous Q15 Min PRN Raj Aldridge MD       • insulin lispro (humaLOG, ADMELOG) injection 2-7 Units  2-7 Units Subcutaneous TID AC Raj Aldridge MD   4 Units at 01/18/21 1332   • losartan (COZAAR) tablet 100 mg  100 mg Oral Q24H Raj Aldridge MD   100 mg at 01/18/21 0820   • pentoxifylline (TRENtal) CR tablet 400 mg  400 mg Oral BID With Meals Raj Aldridge MD   400 mg at 01/18/21 0820   • polyethylene glycol (MIRALAX) packet 17 g  17 g Oral Daily Timoteo, Jayda STALLINGS, APRN   17 g at 01/18/21 1546   • rivaroxaban (XARELTO) tablet 15 mg  15 mg Oral BID With Meals Raj Aldridge MD   15 mg at 01/18/21 0820   • temazepam (RESTORIL) capsule 7.5 mg  7.5 mg Oral Nightly PRN Raj Aldridge MD   7.5 mg at 01/17/21 2050   • terazosin (HYTRIN) capsule 5 mg  5 mg Oral Q12H Raj Aldridge MD   5 mg at 01/18/21 0820     Echo-   · Left ventricular ejection fraction appears to be greater than 70%. Left ventricular systolic function is hyperdynamic (EF > 70%).  · The right ventricular cavity is mildly dilated, but has normal systolic function.  · Estimated right ventricular systolic pressure from tricuspid regurgitation is normal (<35 mmHg).  · No significant valvular pathology.         Assessment/Plan     1.  Acute pulmonary embolism with acute cor pulmonale (CMS/HCC) -now status post completion of TPA treatment via EKOS catheters.    -Continue for anticoagulation Xarelto (PE dosing)  -Due to his tachyarrhythmias today, he has not been ambulating outside of going to the bathroom earlier today.  We are resuming his beta-blocker today.  We will see how he does with ambulation in the morning.  -Lower extremity Duplex preliminary report-no DVTs  -will need close PCP f/u  "after d/c to make sure all cancer screening are up-to-date, as this seems to be an \"unprovoked\" PE  -Echo today revealed a mildly dilated RV and normal systolic function     2.  Acute respiratory failure with hypoxia (CMS/HCC): Secondary to #1 above.   -Now resolved, no longer requiring oxygen.  Oxygen saturation in the mid 90s on room air.     3.  Paroxysmal atrial fibrillation and atrial flutter (both have been observed during this admission): Unknown if purely secondary to #1 above, or if he has had this chronically but been unaware  -regardless, will be anticoagulated as per #1 above  -He has had a couple of relatively short-lived episodes of tachyarrhythmias today-appear to be PAT with heart rates up to 160s to 180s, associated with shortness of breath  -Resume bisoprolol 10 mg daily-Home med     4.   HTN (hypertension), benign: well controlled  -Continue losartan  -Resuming bisoprolol 10 mg daily today     5.  Controlled type 2 diabetes mellitus without complication, without long-term current use of insulin (CMS/HCC): not well controlled at home (A1c 9.4%)  -hold home PO meds while inpatient; fsbs qac/qhs and correciton insulin prn  -defer adjustment in home regimen to PCP as outpatient     6.  Moderate to severe hip pain: Morphine given 2 days ago.  No complaints today.    We will monitor him again overnight given his tachyarrhythmias.    Jayda Mahmood, APRN  01/18/21  16:51 CST            "

## 2021-01-19 NOTE — PLAN OF CARE
Goal Outcome Evaluation:  Plan of Care Reviewed With: patient  Progress: no change  Outcome Summary: VSS. No c/o pain. Rested well through night. NSR/ST  with PVCs on tele through night. Plans to go home today. Will continue to monitor.

## 2021-01-19 NOTE — DISCHARGE SUMMARY
[facility] DISCHARGE    Date of Discharge:  1/19/2021    Discharge Diagnosis:   -Acute pulmonary emboli with acute cor pulmonale status post completion of TPA treatment via EKOS catheters on 1/15/2021  -Acute respiratory failure with hypoxia-resolved, will not require home oxygen  -Paroxysmal atrial fibrillation and atrial flutter, as well as PAT-improved after resuming beta-blocker-no further tachyarrhythmias last night or today.  -Essential hypertension-controlled  -Diabetes mellitus type 2-not well controlled.  Close follow-up with PCP for further adjustments in treatment.    Presenting Problem/History of Present Illness  Multiple subsegmental pulmonary emboli without acute cor pulmonale (CMS/HCC) [I26.94]    Per HPI on admission: 80-year-old male with diabetes and hypertension reports has been in his usual state of health until this morning.  Says he was eating breakfast with his wife and then when he got up and walked to the sink, got dizzy and then ultimately lost consciousness, later awakening on the floor.  He reports he had some mild shortness of breath thereafter, but thinks the oxygen was later applied by EMS once they arrived did help his breathing.  Currently, he denies any chest pain or shortness of breath.  He has had intermittent paroxysms of atrial fibrillation with rapid ventricular response since arrival in ER, with associated drops in blood pressure.  Without intervention, he has converted back to sinus rhythm with subsequent improvement in blood pressure.     He denies any significant bleeding history.  He also denies any recent surgery or prolonged immobility, though notes he is rather sedentary at baseline.  He follows closely with Dr. Phan and states he is up-to-date on cancer screening    Results Reviewed by Dr. Aldridge on admission :   laboratory data, with pertinent findings: Troponin within normal limits, proBNP elevated at 3298.  Glucose 446, CMP otherwise  unremarkable.  TSH normal.  D-dimer 14.13.  INR 1.1.  PTT 27.5.  Hemoglobin 12.3, platelets 92.     CTA chest: Significant RV enlargement with bilateral distal right and distal left pulmonary artery embolism, high clot burden.       EKG: Sinus rhythm, T wave inversion in the precordial leads and lateral leads       Hospital Course  Patient is a 80 y.o. male presented with syncope and shortness of breath as described above.  He was found to have submassive pulmonary embolism, with definite evidence of RV strain on CT.  He received subcutaneous Lovenox, but it was also felt that he would benefit from TPA treatment via EKOS catheters.  He successfully received TPA infusion via EKOS catheters.  Catheters were successfully removed on 1/16/2021.  He did well with the procedure, and his oxygen requirements gradually improved.  Ultimately, for the past couple of days he has been oxygenating well on room air.  He was transitioned to Xarelto for anticoagulation.  2D echo and bilateral lower extremity venous ultrasound was checked.  Results are below.  Also during this hospitalization he did have some intermittent paroxysmal atrial fibrillation, paroxysmal atrial flutter and what appears to be PAT.  Most of these episodes were brief in duration, resolved without specific treatment and occurred with exertion.  He did have some mild increase in shortness of breath with the PAT yesterday.  His home beta-blocker was resumed yesterday, and he has had no further tachyarrhythmias since that time.  He is maintaining normal sinus rhythm with heart rates in the 60s- 100s.  Blood pressures have been well controlled.  His home diabetic regimen has been continued.  He will follow-up with his PCP for further adjustment in diabetes medication/treatment, as this is poorly controlled.  He will be anticoagulated with Xarelto 15 mg twice daily with food for a total duration of 21 days, and then will start Xarelto 20 mg nightly with a meal.  At  this point, PE seems to be unprovoked, and may warrant indefinite anticoagulation.  Additionally, he also needs anticoagulation for his paroxysmal atrial fibrillation and flutter.He is felt to be stable for discharge on medical therapy listed below.  Of note, he has been normotensive this admission despite being without several of his antihypertensives.  Therefore, I have reduced his Norvasc from 10 mg daily to 5 mg daily and discontinued his hydralazine 25 mg p.o. twice daily.  If he is hypertensive at follow-up, some of these may need to be uptitrated/resumed.  His home Lasix, beta-blocker and ARB have been resumed.  Also of note, he needs close follow-up with his PCP to make sure all of his cancer screenings are up-to-date and for consideration of other hypercoagulable work-up.    Echo-   · Left ventricular ejection fraction appears to be greater than 70%. Left ventricular systolic function is hyperdynamic (EF > 70%).  · The right ventricular cavity is mildly dilated, but has normal systolic function.  · Estimated right ventricular systolic pressure from tricuspid regurgitation is normal (<35 mmHg).  · No significant valvular pathology.     BLE venous US-     IMPRESSION:  Impression: There is no evidence of deep venous thrombosis or  superficial thrombophlebitis of right or left lower extremities.     Procedures Performed  Procedure(s):  Ekos catheter placement    Conclusions:  1.  Successful placement of bilateral pulmonary artery catheters for ultrasound-assisted thrombolytic infusion for submassive pulmonary embolism with right ventricular strain.  2.  Patient will be observed in the unit.  After infusion of lytic therapy per protocol the catheters will be removed.    3.  Continue full dose lovenox (1mg/kg SC q12hr), with transition to NOAC therapy tomorrow          Lab Results (last 72 hours)     Procedure Component Value Units Date/Time    POC Glucose Once [[id number]]  (Abnormal) Collected: 01/19/21 0704     Specimen: Blood Updated: 01/19/21 0827     Glucose 277 mg/dL      Comment: : [id number] Miguel EuraisaMeter ID: [id number]       Basic Metabolic Panel [[id number]]  (Abnormal) Collected: 01/19/21 0454    Specimen: Blood Updated: 01/19/21 0532     Glucose 284 mg/dL      BUN 10 mg/dL      Creatinine 0.78 mg/dL      Sodium 138 mmol/L      Potassium 3.8 mmol/L      Chloride 106 mmol/L      CO2 24.0 mmol/L      Calcium 8.9 mg/dL      eGFR Non African Amer 96 mL/min/1.73      BUN/Creatinine Ratio 12.8     Anion Gap 8.0 mmol/L     Narrative:      GFR Normal >60  Chronic Kidney Disease <60  Kidney Failure <15      CBC (No Diff) [[id number]]  (Abnormal) Collected: 01/19/21 0454    Specimen: Blood Updated: 01/19/21 0507     WBC 5.10 10*3/mm3      RBC 3.34 10*6/mm3      Hemoglobin 10.3 g/dL      Hematocrit 28.5 %      MCV 85.3 fL      MCH 30.8 pg      MCHC 36.1 g/dL      RDW 14.0 %      RDW-SD 42.6 fl      MPV 9.8 fL      Platelets 117 10*3/mm3     POC Glucose Once [[id number]]  (Abnormal) Collected: 01/18/21 2050    Specimen: Blood Updated: 01/18/21 2100     Glucose 314 mg/dL      Comment: : [id number] David LazaroandraMeter ID: [id number]       POC Glucose Once [[id number]]  (Abnormal) Collected: 01/18/21 1550    Specimen: Blood Updated: 01/18/21 1620     Glucose 300 mg/dL      Comment: : [id number] Mikel KaciMeter ID: [id number]       POC Glucose Once [[id number]]  (Abnormal) Collected: 01/18/21 1321    Specimen: Blood Updated: 01/18/21 1332     Glucose 278 mg/dL      Comment: : [id number] Danilo HeinieMeter ID: [id number]       POC Glucose Once [[id number]]  (Abnormal) Collected: 01/18/21 0741    Specimen: Blood Updated: 01/18/21 0811     Glucose 270 mg/dL      Comment: : [id number] Mikel BackciMeter ID: [id number]       CBC (No Diff) [[id number]]  (Abnormal) Collected: 01/18/21 0353    Specimen: Blood Updated: 01/18/21 0436     WBC 5.30 10*3/mm3      RBC 3.22 10*6/mm3      Hemoglobin 9.7 g/dL      Hematocrit  27.8 %      MCV 86.3 fL      MCH 30.1 pg      MCHC 34.9 g/dL      RDW 13.9 %      RDW-SD 43.3 fl      MPV 10.1 fL      Platelets 100 10*3/mm3     Basic Metabolic Panel [[id number]]  (Abnormal) Collected: 01/18/21 0353    Specimen: Blood Updated: 01/18/21 0423     Glucose 225 mg/dL      BUN 10 mg/dL      Creatinine 0.81 mg/dL      Sodium 141 mmol/L      Potassium 3.6 mmol/L      Comment: Slight hemolysis detected by analyzer. Results may be affected.        Chloride 109 mmol/L      CO2 26.0 mmol/L      Calcium 8.4 mg/dL      eGFR Non African Amer 92 mL/min/1.73      BUN/Creatinine Ratio 12.3     Anion Gap 6.0 mmol/L     Narrative:      GFR Normal >60  Chronic Kidney Disease <60  Kidney Failure <15      POC Glucose Once [[id number]]  (Abnormal) Collected: 01/17/21 1950    Specimen: Blood Updated: 01/17/21 2001     Glucose 305 mg/dL      Comment: : [id number] David NeoVistaandraMeter ID: [id number]       POC Glucose Once [[id number]]  (Abnormal) Collected: 01/17/21 1642    Specimen: Blood Updated: 01/17/21 1657     Glucose 280 mg/dL      Comment: : [id number] Langley HeatherMeter ID: [id number]       POC Glucose Once [[id number]]  (Abnormal) Collected: 01/17/21 1114    Specimen: Blood Updated: 01/17/21 1127     Glucose 312 mg/dL      Comment: : [id number] Langley HeatherMeter ID: [id number]       POC Glucose Once [[id number]]  (Abnormal) Collected: 01/17/21 0758    Specimen: Blood Updated: 01/17/21 0820     Glucose 260 mg/dL      Comment: : [id number] Langley HeatherMeter ID: [id number]       Basic Metabolic Panel [[id number]]  (Abnormal) Collected: 01/17/21 0408    Specimen: Blood Updated: 01/17/21 0459     Glucose 187 mg/dL      BUN 12 mg/dL      Creatinine 0.81 mg/dL      Sodium 143 mmol/L      Potassium 3.5 mmol/L      Chloride 110 mmol/L      CO2 26.0 mmol/L      Calcium 8.3 mg/dL      eGFR Non African Amer 92 mL/min/1.73      BUN/Creatinine Ratio 14.8     Anion Gap 7.0 mmol/L     Narrative:      GFR Normal  >60  Chronic Kidney Disease <60  Kidney Failure <15      CBC (No Diff) [[id number]]  (Abnormal) Collected: 01/17/21 0408    Specimen: Blood Updated: 01/17/21 0445     WBC 6.53 10*3/mm3      RBC 3.24 10*6/mm3      Hemoglobin 10.1 g/dL      Hematocrit 28.2 %      MCV 87.0 fL      MCH 31.2 pg      MCHC 35.8 g/dL      RDW 13.8 %      RDW-SD 42.8 fl      MPV 10.7 fL      Platelets 99 10*3/mm3     POC Glucose Once [[id number]]  (Abnormal) Collected: 01/16/21 2009    Specimen: Blood Updated: 01/16/21 2021     Glucose 280 mg/dL      Comment: : [id number] David EscobedoraMeter ID: [id number]       POC Glucose Once [[id number]]  (Abnormal) Collected: 01/16/21 1630    Specimen: Blood Updated: 01/16/21 1642     Glucose 270 mg/dL      Comment: : [id number] Ponce Pacheco  JMeter ID: [id number]       POC Glucose Once [[id number]]  (Abnormal) Collected: 01/16/21 1103    Specimen: Blood Updated: 01/16/21 1125     Glucose 208 mg/dL      Comment: : [id number] Ponce Munsons  JMeter ID: [id number]           Condition on Discharge:  Stable     Physical Exam at Discharge  General: Alert and oriented  Card: RRR -telemetry reveals normal sinus rhythm heart rate 60s to low 100s with PVCs  Resp: CTA  Extrem: 3+ BLE edema, right femoral vein access site CDI, no redness, warmth, drainage or hematoma     Vital Signs  Temp:  [97.9 °F (36.6 °C)-99.4 °F (37.4 °C)] 98.1 °F (36.7 °C)  Heart Rate:  [] 75  Resp:  [16-18] 18  BP: (125-143)/(57-72) 129/59    Discharge Disposition  Home or Self Care    Discharge Medications     Discharge Medications      New Medications      Instructions Start Date   rivaroxaban 15 MG tablet  Commonly known as: XARELTO   15 mg, Oral, 2 Times Daily With Meals      rivaroxaban 20 MG tablet  Commonly known as: XARELTO   20 mg, Oral, Daily With Dinner, START AFTER COMPLETION OF 15 MG BID X 21 DAYS (SAMPLES OF 15 MG TABS PROVIDED TO PT)         Changes to Medications      Instructions Start Date   amLODIPine 5  MG tablet  Commonly known as: NORVASC  What changed:   · medication strength  · how much to take   5 mg, Oral, Daily         Continue These Medications      Instructions Start Date   allopurinol 300 MG tablet  Commonly known as: ZYLOPRIM   300 mg, Oral      atorvastatin 20 MG tablet  Commonly known as: LIPITOR   20 mg, Oral, Daily      bisoprolol 10 MG tablet  Commonly known as: ZEBeta   10 mg, Oral, Daily      Diclofenac Sodium 1 % gel gel  Commonly known as: VOLTAREN   4 g, Topical, 4 Times Daily PRN      doxazosin 8 MG tablet  Commonly known as: CARDURA   8 mg, Oral, 2 times daily      doxepin 10 MG capsule  Commonly known as: SINEquan   10 mg, Oral, Daily      furosemide 40 MG tablet  Commonly known as: LASIX   40 mg, Oral, Daily      glimepiride 4 MG tablet  Commonly known as: AMARYL   4 mg, Oral, 2 times daily      irbesartan 300 MG tablet  Commonly known as: AVAPRO   300 mg, Oral, Daily      metFORMIN  MG 24 hr tablet  Commonly known as: GLUCOPHAGE-XR   1,000 mg, Oral, Daily With Breakfast      pentoxifylline 400 MG CR tablet  Commonly known as: TRENtal   400 mg, 2 times daily         Stop These Medications    aspirin 81 MG EC tablet     hydrALAZINE 25 MG tablet  Commonly known as: APRESOLINE     meloxicam 7.5 MG tablet  Commonly known as: MOBIC            Discharge Diet: Heart healthy, diabetic    Activity at Discharge:    No heavy lifting for 5-7 days greater than 10 pounds.  No heavy or strenuous pushing or pulling.  No tub baths, hot tubs, swimming pools, or submerging groin underwater for 1 week.  Wash groin site daily with antibacterial soap and water. Rinse and keep clean and dry.  No lotions, powders, or topical ointments to site. Keep open to air and dry.  Call our office with any concerns or if you notice redness, swelling, drainage, warmth, or pain at the site.    Follow-up Appointments  PCP, Dr. Phan in 1 to 2 weeks  Dr. Aldridge or BRIANNA Greenwood 6 weeks      Electronically signed by Jayda COE  Timoteo, APRN, 01/19/21, 10:43 AM CST.     Time: 45 MINUTES

## 2021-01-20 NOTE — OUTREACH NOTE
Prep Survey      Responses   [facility] patient discharged from?  [place]   Is LACE score < 7 ?  No   Emergency Room discharge w/ pulse ox?  No   Eligibility  Readm Mgmt   Discharge diagnosis  Acute pulmonary embolism with acute cor pulmonale    Does the patient have one of the following disease processes/diagnoses(primary or secondary)?  Other   Does the patient have Home health ordered?  No   Is there a DME ordered?  No   Comments regarding appointments  Per AVS   Medication alerts for this patient  start xarelto   Prep survey completed?  Yes          Veronica Dee RN

## 2021-01-22 NOTE — OUTREACH NOTE
Medical Week 1 Survey      Responses   [facility] patient discharged from?  [place]   Does the patient have one of the following disease processes/diagnoses(primary or secondary)?  Other   Week 1 attempt successful?  Yes   Call start time  1726   Rescheduled  Rescheduled-pt requested   Call end time  1727          Sharmila Graham, RN

## 2021-01-25 NOTE — OUTREACH NOTE
Medical Week 1 Survey      Responses   [facility] patient discharged from?  [place]   Does the patient have one of the following disease processes/diagnoses(primary or secondary)?  Other   Week 1 attempt successful?  Yes   Call start time  1514   Call end time  1519   Medication alerts for this patient  Medication of Meformin increased to 2 tabs BID   Meds reviewed with patient/caregiver?  Yes   Is the patient having any side effects they believe may be caused by any medication additions or changes?  No   Does the patient have all medications ordered at discharge?  Yes   Is the patient taking all medications as directed (includes completed medication regime)?  Yes   Comments regarding appointments  has seen Emilie   Does the patient have a primary care provider?   Yes   Does the patient have an appointment with their PCP within 7 days of discharge?  Yes   Has the patient kept scheduled appointments due by today?  Yes   Has home health visited the patient within 72 hours of discharge?  N/A   Did the patient receive a copy of their discharge instructions?  Yes   Nursing interventions  Reviewed instructions with patient   What is the patient's perception of their health status since discharge?  Improving [has bruised tail bone and left middle toe has cream and healing well]   Is the patient/caregiver able to teach back signs and symptoms related to disease process for when to call PCP?  Yes   Is the patient/caregiver able to teach back signs and symptoms related to disease process for when to call 911?  Yes   Is the patient/caregiver able to teach back the hierarchy of who to call/visit for symptoms/problems? PCP, Specialist, Home health nurse, Urgent Care, ED, 911  Yes   If the patient is a current smoker, are they able to teach back resources for cessation?  Not a smoker   Week 1 call completed?  Yes   Wrap up additional comments  doing  well  has bruised tailbone          Leni Chin RN

## 2021-01-31 NOTE — OUTREACH NOTE
Medical Week 2 Survey      Responses   [facility] patient discharged from?  [place]   Does the patient have one of the following disease processes/diagnoses(primary or secondary)?  Other   Week 2 attempt successful?  Yes   Call start time  1641   Call end time  1645   Meds reviewed with patient/caregiver?  Yes   Is the patient taking all medications as directed (includes completed medication regime)?  Yes   Has the patient kept scheduled appointments due by today?  Yes   Psychosocial issues?  No   Comments  Has been feeling dizzy at times, when standing told to call Dr. Phan in AM   What is the patient's perception of their health status since discharge?  Improving   Week 2 Call Completed?  Yes   Wrap up additional comments  he is doing well, just dizzy at times when standing, told to call Dr. Phan in AM to tell of this          Sharmila Graham RN

## 2021-02-09 NOTE — OUTREACH NOTE
Medical Week 3 Survey      Responses   [facility] patient discharged from?  [place]   Does the patient have one of the following disease processes/diagnoses(primary or secondary)?  Other   Week 3 attempt successful?  Yes   Call start time  1500   Call end time  1502   Discharge diagnosis  Acute pulmonary embolism with acute cor pulmonale    Meds reviewed with patient/caregiver?  Yes   Is the patient having any side effects they believe may be caused by any medication additions or changes?  No   Does the patient have all medications ordered at discharge?  Yes   Is the patient taking all medications as directed (includes completed medication regime)?  Yes   Does the patient have a primary care provider?   Yes   Does the patient have an appointment with their PCP within 7 days of discharge?  Yes   Has the patient kept scheduled appointments due by today?  Yes   Has home health visited the patient within 72 hours of discharge?  N/A   Psychosocial issues?  No   Did the patient receive a copy of their discharge instructions?  Yes   Nursing interventions  Reviewed instructions with patient   What is the patient's perception of their health status since discharge?  Improving   Is the patient/caregiver able to teach back signs and symptoms related to disease process for when to call PCP?  Yes   Is the patient/caregiver able to teach back signs and symptoms related to disease process for when to call 911?  Yes   Is the patient/caregiver able to teach back the hierarchy of who to call/visit for symptoms/problems? PCP, Specialist, Home health nurse, Urgent Care, ED, 911  Yes   Week 3 Call Completed?  Yes          Barrera Rankin RN

## 2021-02-10 NOTE — TELEPHONE ENCOUNTER
Spoke with patient.  He denied any issues with palpitations.  I explained Dr. Aldridge's response.  I advised him to see his PCP when available.

## 2021-02-10 NOTE — TELEPHONE ENCOUNTER
"Patient called the office today to report he has started to feel \"swimmy headed\" over the last day or so.  He also feels a slight increase in SOA-more noticeable when he lays on his left side.  He states he doesn't notice it at all when he is laying on his right side.  He reports his BP and HR have been normal, with today's readings being 124/76 and 77.  He denies weight gain, increased edema, chest pain, any signs or symptoms of bleeding, any signs or symptoms of an arrhythmia.  He has tried calling his PCP, but they are closed due to the weather.  He is concerned this may related to his PE diagnosis and wanted to know your thoughts.  Please advise.   "

## 2021-02-17 NOTE — OUTREACH NOTE
Medical Week 4 Survey      Responses   [facility] patient discharged from?  [place]   Does the patient have one of the following disease processes/diagnoses(primary or secondary)?  Other   Week 4 attempt successful?  Yes   Call start time  1118   Call end time  1120   Discharge diagnosis  Acute pulmonary embolism with acute cor pulmonale    Meds reviewed with patient/caregiver?  Yes   Is the patient taking all medications as directed (includes completed medication regime)?  Yes   Has the patient kept scheduled appointments due by today?  Yes   Is the patient still receiving Home Health Services?  N/A   What is the patient's perception of their health status since discharge?  Improving   Week 4 Call Completed?  Yes   Would the patient like one additional call?  No   Graduated  Yes   Is the patient interested in additional calls from an ambulatory ?  NOTE:  applies to high risk patients requiring additional follow-up.  No   Did the patient feel the follow up calls were helpful during their recovery period?  Yes          Cordelia Canela RN

## 2021-02-19 PROBLEM — Z86.711 HISTORY OF PULMONARY EMBOLISM: Status: ACTIVE | Noted: 2021-01-01

## 2021-02-19 PROBLEM — I26.09 ACUTE PULMONARY EMBOLISM WITH ACUTE COR PULMONALE (HCC): Status: RESOLVED | Noted: 2021-01-01 | Resolved: 2021-01-01

## 2021-02-19 NOTE — PROGRESS NOTES
"     Subjective:     Encounter Date:02/19/2021      Patient ID: Dayday Andrews is a 80 y.o. male.    Chief Complaint: routine f/u  History of Present Illness  The patient is a80-year-old male who returns today for his first outpatient visit after we met when he was hospitalized with a submassive PE in 01/2021. He had evidence of RV strain, fulfilling diagnosis as a submassive PE, so he was offered catheter-directed thrombolytic therapy in addition to standard treatment with full anticoagulation. I did place EKOS catheters into bilateral pulmonary arteries for TPA infusion over 12 hours, and also when he was discharged home on 01/19/2021 with \"PE dosing\" of Xarelto. The PE itself seemed unprovoked at the time, so we did recommend that Dr. Phan, his PCP, see him, and perform full malignancy screening as an outpatient. It was also noted during the hospitalization that the patient had several different atrial arrhythmias including atrial fibrillation, and flutter, and also possibly multifocal atrial tachycardia such that I felt as though he would need ongoing or indefinite anticoagulation for CVA prophylaxis with this. On telemetry in the hospital, he did have symptoms, particularly more notable dyspnea during short-lived episodes of narrow-complex tachycardias consistent with PAT. Once we were able to get him back on his home beta-blocker dose, he was observed for one more night on telemetry, and had no further tachyarrhythmias maintaining sinus rhythm with rate in the 60s up to 100s with well controlled blood pressures prior to being discharged. Some of his pre-existing anti-hypertensives were adjusted during that hospitalization including reduction of Norvasc from 10 mg down to 5 mg daily as well as discontinuation of hydralazine.    Since then, Mr. Andrews reports he has been having dizziness upon standing, and has to sit on the side of the bed, before getting up. The dizziness is described as a feeling of " lightheadedness. He currently is prescribed irbesartan 300 mg tablet, and states that he was told by Dr. Phan to take 150 mg daily. He is also prescribed doxazocin twice daily, but states that he stopped it completely since he got home from the hospital. He denies difficulty breathing or feeling his heart racing. He denies ever taking Norvasc (amlodipine), or Procardia (nifedipine). He reports that his heart rate runs 75 to 76. He denies experiencing any further short bursts of heart racing fast associated with shortness of breath that he experienced in the hospital.    The following portions of the patient's history were reviewed and updated as appropriate: allergies, current medications, past family history, past medical history, past social history, past surgical history and problem list.    Review of Systems   Constitution: Negative for malaise/fatigue.   Cardiovascular: Negative for chest pain, claudication, dyspnea on exertion, leg swelling, near-syncope, orthopnea, palpitations, paroxysmal nocturnal dyspnea and syncope.   Respiratory: Negative for shortness of breath.    Hematologic/Lymphatic: Does not bruise/bleed easily.   Neurological: Positive for dizziness (upon standing).         Current Outpatient Medications:   •  allopurinol (ZYLOPRIM) 300 MG tablet, Take 300 mg by mouth., Disp: , Rfl:   •  amLODIPine (NORVASC) 5 MG tablet, Take 1 tablet by mouth Daily., Disp: 30 tablet, Rfl: 11  •  atorvastatin (LIPITOR) 20 MG tablet, Take 1 tablet by mouth Daily., Disp:  , Rfl:   •  bisoprolol (ZEBeta) 10 MG tablet, Take 10 mg by mouth Daily., Disp: , Rfl:   •  Diclofenac Sodium (VOLTAREN) 1 % gel gel, Apply 4 g topically to the appropriate area as directed 4 (Four) Times a Day As Needed (knee)., Disp: , Rfl:   •  doxepin (SINEquan) 10 MG capsule, Take 10 mg by mouth Daily., Disp: , Rfl:   •  furosemide (LASIX) 40 MG tablet, Take 40 mg by mouth Daily., Disp: , Rfl:   •  glimepiride (AMARYL) 4 MG tablet, Take 4 mg  by mouth 2 (two) times a day., Disp: , Rfl:   •  irbesartan (AVAPRO) 300 MG tablet, Take 150 mg by mouth Daily., Disp: , Rfl:   •  letrozole (FEMARA) 2.5 MG tablet, Take  by mouth Daily., Disp: , Rfl:   •  metFORMIN ER (GLUCOPHAGE-XR) 500 MG 24 hr tablet, Take 500 mg by mouth 3 (Three) Times a Day., Disp: , Rfl:   •  pentoxifylline (TRENtal) 400 MG CR tablet, 400 mg 2 (two) times a day., Disp: , Rfl:   •  rivaroxaban (XARELTO) 20 MG tablet, Take 1 tablet by mouth Daily With Dinner. START AFTER COMPLETION OF 15 MG BID X 21 DAYS (SAMPLES OF 15 MG TABS PROVIDED TO PT), Disp: 30 tablet, Rfl: 5  •  buPROPion XL (Wellbutrin XL) 300 MG 24 hr tablet, Take 1 tablet by mouth Daily., Disp:  , Rfl:        Objective:      Vitals:    02/19/21 1100   BP: 138/70   Pulse: 79   SpO2: 99%     Vitals signs and nursing note reviewed.   Constitutional:       General: Not in acute distress.     Appearance: Not in distress.   Neck:      Vascular: No JVD or JVR. JVD normal.   Pulmonary:      Effort: Pulmonary effort is normal.      Breath sounds: Normal breath sounds.   Cardiovascular:      Normal rate. Regular rhythm.      Murmurs: There is no murmur.      No gallop. No rub.   Pulses:     Intact distal pulses.   Edema:     Peripheral edema absent.   Musculoskeletal:      Comments: He uses a cane to ambulate.   Skin:     General: Skin is warm and dry.   Neurological:      Mental Status: Alert, oriented to person, place, and time and oriented to person, place and time.         Lab Review:       We reviewed his home log of blood pressures in the office today, which reveals an average systolic blood pressure in the 130s, and an average diastolic blood pressure in the 70s to 80s. The range of systolics goes from 119 up to 156.    ECG 12 Lead    Date/Time: 2/19/2021 4:04 PM  Performed by: Raj Aldridge MD  Authorized by: Raj Aldridge MD   Comparison: not compared with previous ECG   Rhythm: sinus rhythm  BPM: 79  Conduction: 1st degree AV  "block  Other findings: poor R wave progression  Other findings comments: Low voltage QRS    Clinical impression: abnormal EKG          Results for orders placed during the hospital encounter of 01/15/21   Adult Transthoracic Echo Complete W/ Cont if Necessary Per Protocol    Narrative · Left ventricular ejection fraction appears to be greater than 70%. Left   ventricular systolic function is hyperdynamic (EF > 70%).  · The right ventricular cavity is mildly dilated, but has normal systolic   function.  · Estimated right ventricular systolic pressure from tricuspid   regurgitation is normal (<35 mmHg).  · No significant valvular pathology.            Assessment/Plan:     Problem List Items Addressed This Visit        Cardiac and Vasculature    HTN (hypertension), benign    Paroxysmal atrial fibrillation (CMS/HCC) - Primary       Coag and Thromboembolic    History of pulmonary embolism    Overview     Jan '21 - submassive PE started on DOAC and treated with EKOS catheter-directed thrombolytics  Seemed \"unprovoked\" - but we asked that his PCP perform malignancy screening as outpatient                 Recommendations/plans:  1. Atrial fibrillation, and atrial flutter as well as multifocal atrial tachycardia. Those are stable now with no known recurrences, and he is not having any symptoms to suggest recurrences.  - Continue Xarelto to prevent stroke.  - Continue the beta-blocker (bisoprolol) to maintain rate control    2. History of pulmonary embolism: just 5 weeks s/p catheter-directed thrombolytics for submassive PE.   - Continue Xarelto (but given #1 above, recommend continuing indefinitely).   -will cc a copy of this note to Dr. Phan to request assistance in investigating why the pulmonary embolism occurred (make sure he is up-to-date on colonoscopy, prostate, etc.)    3. Essential hypertension: stable/well controlled  - We will request a copy of his medication records from Dr. Phan, particularly irbesartan, and " doxazosin to reconcile our records.   - Continue anti-hypertensive medications.    Return to clinic in 6 months or sooner for any problems.    Scribed for Raj Aldridge MD by MELINDA GRIMALDO.  2/22/2021  16:07 CST    I Raj Aldridge MD have personally performed the services described in this document as scribed by the above individual, and it is both accurate and complete.   I have edited each component as needed.    Raj Aldridge MD  2/22/2021  07:58 CST    ADDENDUM:  MED LIST RECEIVED FROM DR. ELENA AND RECONCILED IN OUR CHART    Raj Aldridge MD

## 2021-03-07 NOTE — TELEPHONE ENCOUNTER
States Dr. Phan is his PCP. States he has been changing his BP medications around. States he just checked it and it is 172/95. Denies any symptoms. States he just took his nighttime BP meds less than an hour ago. Explained as long as no s/s (chest pain, shortness of breath, headache, weakness, etc) to keep track of BP in morning and night and f/u with PCP on Monday. If any symptoms develop or if /120 without s/s to notify healthline. He is agreeable. Explained nighttime medication likely has not had time to start working yet.     Reason for Disposition  • Systolic BP  >= 160 OR Diastolic >= 100    Additional Information  • Negative: Difficult to awaken or acting confused (e.g., disoriented, slurred speech)  • Negative: Severe difficulty breathing (e.g., struggling for each breath, speaks in single words)  • Negative: [1] Weakness of the face, arm or leg on one side of the body AND [2] new onset  • Negative: [1] Numbness (i.e., loss of sensation) of the face, arm or leg on one side of the body AND [2] new onset  • Negative: [1] Chest pain lasts > 5 minutes AND [2] history of heart disease  (i.e., heart attack, bypass surgery, angina, angioplasty, CHF)  • Negative: [1] Chest pain AND [2] took nitrogylcerin AND [3] pain was not relieved  • Negative: Sounds like a life-threatening emergency to the triager  • Negative: Symptom is main concern  (e.g., headache, chest pain)  • Negative: Low blood pressure is main concern  • Negative: [1] Systolic BP  >= 160 OR Diastolic >= 100 AND [2] cardiac or neurologic symptoms (e.g., chest pain, difficulty breathing, unsteady gait, blurred vision)  • Negative: [1] Pregnant > 20 weeks (or postpartum < 6 weeks) AND [2] new hand or face swelling  • Negative: [1] Pregnant > 20 weeks AND [2] BP Systolic BP  >= 140 OR Diastolic >= 90  • Negative: [1] Systolic BP  >= 200 OR Diastolic >= 120  AND [2] having NO cardiac or neurologic symptoms  • Negative: [1] Postpartum < 6 weeks AND [2]  "BP Systolic BP  >= 140 OR Diastolic >= 90  • Negative: [1] Systolic BP  >= 180 OR Diastolic >= 110 AND [2] missed most recent dose of blood pressure medication  • Negative: Systolic BP  >= 180 OR Diastolic >= 110  • Negative: Ran out of BP medications    Answer Assessment - Initial Assessment Questions  1. BLOOD PRESSURE: \"What is the blood pressure?\" \"Did you take at least two measurements 5 minutes apart?\"      See note  2. ONSET: \"When did you take your blood pressure?\"      n/a  3. HOW: \"How did you obtain the blood pressure?\" (e.g., visiting nurse, automatic home BP monitor)      n/a  4. HISTORY: \"Do you have a history of high blood pressure?\"      n/  5. MEDICATIONS: \"Are you taking any medications for blood pressure?\" \"Have you missed any doses recently?\"      An/a  6. OTHER SYMPTOMS: \"Do you have any symptoms?\" (e.g., headache, chest pain, blurred vision, difficulty breathing, weakness)      n/  7. PREGNANCY: \"Is there any chance you are pregnant?\" \"When was your last menstrual period?\"      n/a    Protocols used: HIGH BLOOD PRESSURE-ADULT-AH      "

## 2021-04-01 NOTE — PROGRESS NOTES
Subjective    Mr. Andrews is 80 y.o. male    Chief Complaint: New Patient / Urinary Retention     History of Present Illness  Urinary Retention  Patient complains of urinary retention. Onset of retention was 7 days ago and was sudden in onset. Patient currently does have a urinary catheter in place.  500 ml of urine were drained when catheter was placed. Prior to this event voiding symptoms consisted of none. Prior treatments include none prior to ER visit. Recent medications that may have affected his voiding include none.  Patient had no previous history of retention or has not had to see urologist in the past.  Patient fell and was found to have a fracture of his L2 without spinal involvement.  Patient is also constipated and on pain medication.    The following portions of the patient's history were reviewed and updated as appropriate: allergies, current medications, past family history, past medical history, past social history, past surgical history and problem list.    Review of Systems   Constitutional: Negative for appetite change and fever.   HENT: Negative for hearing loss and sore throat.    Eyes: Negative for pain and redness.   Respiratory: Negative for cough and shortness of breath.    Cardiovascular: Negative for chest pain and leg swelling.   Gastrointestinal: Negative for anal bleeding, nausea and vomiting.   Endocrine: Negative for cold intolerance and heat intolerance.   Genitourinary: Negative for dysuria, flank pain, frequency, hematuria, testicular pain and urgency.   Musculoskeletal: Negative for joint swelling and myalgias.   Skin: Negative for color change and rash.   Allergic/Immunologic: Negative for immunocompromised state.   Neurological: Negative for dizziness and speech difficulty.   Hematological: Negative for adenopathy. Does not bruise/bleed easily.   Psychiatric/Behavioral: Negative for dysphoric mood and suicidal ideas.         Current Outpatient Medications:   •  allopurinol  (ZYLOPRIM) 300 MG tablet, Take 300 mg by mouth., Disp: , Rfl:   •  amLODIPine (NORVASC) 5 MG tablet, Take 1 tablet by mouth Daily., Disp: 30 tablet, Rfl: 11  •  atorvastatin (LIPITOR) 20 MG tablet, Take 1 tablet by mouth Daily., Disp:  , Rfl:   •  bisoprolol (ZEBeta) 10 MG tablet, Take 10 mg by mouth Daily., Disp: , Rfl:   •  buPROPion XL (Wellbutrin XL) 300 MG 24 hr tablet, Take 1 tablet by mouth Daily., Disp:  , Rfl:   •  cyclobenzaprine (FLEXERIL) 10 MG tablet, Take 1 tablet by mouth 3 (Three) Times a Day As Needed for Muscle Spasms., Disp: 15 tablet, Rfl: 0  •  Diclofenac Sodium (VOLTAREN) 1 % gel gel, Apply 4 g topically to the appropriate area as directed 4 (Four) Times a Day As Needed (knee)., Disp: , Rfl:   •  doxazosin (CARDURA) 8 MG tablet, Take 8 mg by mouth., Disp: , Rfl:   •  doxepin (SINEquan) 10 MG capsule, Take 10 mg by mouth Daily., Disp: , Rfl:   •  furosemide (LASIX) 40 MG tablet, Take 40 mg by mouth Daily., Disp: , Rfl:   •  glimepiride (AMARYL) 4 MG tablet, Take 4 mg by mouth 2 (two) times a day., Disp: , Rfl:   •  HYDROcodone-acetaminophen (NORCO) 5-325 MG per tablet, Take 1 tablet by mouth Every 6 (Six) Hours As Needed for Severe Pain ., Disp: 12 tablet, Rfl: 0  •  irbesartan (AVAPRO) 300 MG tablet, Take 150 mg by mouth Daily., Disp: , Rfl:   •  letrozole (FEMARA) 2.5 MG tablet, Take  by mouth Daily., Disp: , Rfl:   •  metFORMIN ER (GLUCOPHAGE-XR) 500 MG 24 hr tablet, Take 500 mg by mouth 3 (Three) Times a Day., Disp: , Rfl:   •  pentoxifylline (TRENtal) 400 MG CR tablet, 400 mg 2 (two) times a day., Disp: , Rfl:   •  rivaroxaban (XARELTO) 20 MG tablet, Take 1 tablet by mouth Daily With Dinner. START AFTER COMPLETION OF 15 MG BID X 21 DAYS (SAMPLES OF 15 MG TABS PROVIDED TO PT), Disp: 30 tablet, Rfl: 5  •  tamsulosin (FLOMAX) 0.4 MG capsule 24 hr capsule, Take 1 capsule by mouth Daily., Disp: 15 capsule, Rfl: 0  •  clotrimazole-betamethasone (LOTRISONE) 1-0.05 % cream, Apply  topically to the  "appropriate area as directed 2 (Two) Times a Day for 14 days. After cleaning the affected area bid for 14 days, Disp: 45 g, Rfl: 1  •  tamsulosin (FLOMAX) 0.4 MG capsule 24 hr capsule, Take 1 capsule by mouth Every Night for 60 days., Disp: 30 capsule, Rfl: 1    Past Medical History:   Diagnosis Date   • Arthritis    • Diabetes (CMS/HCC)    • High blood pressure    • Hx of colonic polyp    • Sleep apnea        Past Surgical History:   Procedure Laterality Date   • COLONOSCOPY N/A 12/13/2018    Procedure: COLONOSCOPY WITH ANESTHESIA;  Surgeon: Lauri Bourne MD;  Location: [facility] ENDOSCOPY;  Service: Gastroenterology   • COLONOSCOPY W/ POLYPECTOMY  09/22/2015    3 Tubular adenomas Ileocecal valve and at 80 cm repeat exam in 3 years   • EKOS CATHETER PLACEMENT Bilateral 1/15/2021    Procedure: Ekos catheter placement;  Surgeon: Raj Aldridge MD;  Location: [facility] CATH INVASIVE LOCATION;  Service: Cardiovascular;  Laterality: Bilateral;   • KNEE SURGERY         Social History     Socioeconomic History   • Marital status:      Spouse name: Not on file   • Number of children: Not on file   • Years of education: Not on file   • Highest education level: Not on file   Tobacco Use   • Smoking status: Never Smoker   • Smokeless tobacco: Never Used   Vaping Use   • Vaping Use: Never used   Substance and Sexual Activity   • Alcohol use: No   • Drug use: Never   • Sexual activity: Defer       Family History   Problem Relation Age of Onset   • Diabetes Mother    • Colon cancer Neg Hx    • Colon polyps Neg Hx        Objective    Ht 185.4 cm (72.99\")   Wt 101 kg (222 lb)   BMI 29.30 kg/m²     Physical Exam  Vitals reviewed.   Constitutional:       Appearance: Normal appearance.   HENT:      Head: Normocephalic and atraumatic.      Right Ear: External ear normal.      Left Ear: External ear normal.      Nose: No congestion.   Eyes:      Conjunctiva/sclera: Conjunctivae normal.   Neck:      Comments: I observed no " obvious neck masses  Pulmonary:      Effort: Pulmonary effort is normal.   Genitourinary:     Penis: Uncircumcised.       Comments: No genitalia swelling some redness involving the urethral meatus is a catheter inserted within urethral meatus.   Musculoskeletal:      Comments: Patient ambulates with difficulty needs assistant to be moved to the exam table.   Neurological:      General: No focal deficit present.      Mental Status: He is alert and oriented to person, place, and time.   Psychiatric:         Mood and Affect: Mood normal.         Behavior: Behavior normal.               Assessment and Plan    Diagnoses and all orders for this visit:    1. Retention of urine (Primary)  -     tamsulosin (FLOMAX) 0.4 MG capsule 24 hr capsule; Take 1 capsule by mouth Every Night for 60 days.  Dispense: 30 capsule; Refill: 1    2. Tinea cruris  -     clotrimazole-betamethasone (LOTRISONE) 1-0.05 % cream; Apply  topically to the appropriate area as directed 2 (Two) Times a Day for 14 days. After cleaning the affected area bid for 14 days  Dispense: 45 g; Refill: 1    3. Constipation, unspecified constipation type    Patient had catheter placed 03/29/2020 with inability to void after a fall which resulted in fracture of L2.  There is no spinal involvement patient is on pain medication is also constipated.  He was started on tamsulosin at the ER.  Catheter is removed for voiding trial and 120 cc was inserted into his bladder is not able to void after discussion we will replace the catheter and 200 cc was returned when catheter was placed.    I feel a combination of pain medication and also with constipation with no bowel movement for 7 days I feel we can have better bowel movements this will help ultimately with his retention I recommend he take over-the-counter MiraLAX as well as the Dulcolax he is already taking.  You follow-up next week as scheduled.    Has a fungal infection in the thigh folds will treat with Lotrisone  cream to apply twice a day for 10 days may repeat.

## 2021-04-05 NOTE — PROGRESS NOTES
Patient of Dr. Fajardo states he is here today to have his catheter removed. The patient denies any fever, chills or  N&V. Using the catheter in place and sterile water 120cc was installed into the bladder with no complications. Patient was able to urinate 00cc.   Patient advised to schedule his 1 week follow up with Dr. Fajardo and to call the office with any questions or concerns. The patient verbalized understanding. Farrukh Velasquez PA-C was in the office at the time of procedure.     Dayday Stevensony is here today for catheter change.  Patient is seen by Dr. Fajardo.  The old catheter was removed without difficulty. Using sterile technique the patient was prepped with 3x Iodine and new 16F catheter was placed. 10 cc of sterile water used to inflated the balloon and catheter was then attached to a leg bag. Patient tolerated the procedure well.  Farrukh Velasquez PA-C was in the office at the time of procedure. The patient was advised to return in 1 week for appointment with Dr Fajardo. Patient verbalized understanding. Once catheter was placed pt voided 200 cc.

## 2021-04-10 PROBLEM — E86.0 MILD DEHYDRATION: Status: ACTIVE | Noted: 2021-01-01

## 2021-04-10 PROBLEM — E86.0 DEHYDRATION: Status: ACTIVE | Noted: 2021-01-01

## 2021-04-10 PROBLEM — R33.9 URINARY RETENTION: Status: ACTIVE | Noted: 2021-01-01

## 2021-04-10 PROBLEM — N39.0 UTI (URINARY TRACT INFECTION) DUE TO URINARY INDWELLING CATHETER (HCC): Status: ACTIVE | Noted: 2021-01-01

## 2021-04-10 PROBLEM — R13.10 DYSPHAGIA: Status: ACTIVE | Noted: 2021-01-01

## 2021-04-10 PROBLEM — R62.7 FAILURE TO THRIVE IN ADULT: Status: ACTIVE | Noted: 2021-01-01

## 2021-04-10 PROBLEM — T83.511A UTI (URINARY TRACT INFECTION) DUE TO URINARY INDWELLING CATHETER (HCC): Status: ACTIVE | Noted: 2021-01-01

## 2021-04-10 PROBLEM — R27.8 DYSMETRIA: Status: ACTIVE | Noted: 2021-01-01

## 2021-04-10 PROBLEM — L97.511 ULCER OF RIGHT FOOT, LIMITED TO BREAKDOWN OF SKIN (HCC): Status: ACTIVE | Noted: 2021-01-01

## 2021-04-10 PROBLEM — Z78.9 INABILITY TO PERFORM ACTIVITIES OF DAILY LIVING: Status: ACTIVE | Noted: 2021-01-01

## 2021-04-10 PROBLEM — R33.8 ACUTE URINARY RETENTION: Status: ACTIVE | Noted: 2021-01-01

## 2021-04-10 PROBLEM — R53.1 GENERALIZED WEAKNESS: Status: ACTIVE | Noted: 2021-01-01

## 2021-04-10 NOTE — TELEPHONE ENCOUNTER
"[facility] nurse Alexsandra calling to report pt. Condition, unable to change position in the bed following ED visit last night. Connected Alexsandra to Dr. Phan and report was made. Alexsandra agrees to call EMS for pt. As advised by Dr. Phan.    Reason for Disposition  • [1] Follow-up call from patient regarding patient's clinical status AND [2] information urgent    Additional Information  • Negative: Lab calling with strep throat test results and triager can call in prescription  • Negative: Lab calling with urinalysis test results and triager can call in prescription  • Negative: Medication questions  • Negative: ED call to PCP  • Negative: Physician call to PCP  • Negative: Call about patient who is currently hospitalized  • Negative: Lab or radiology calling with CRITICAL test results  • Negative: [1] Prescription not at pharmacy AND [2] was prescribed today by PCP    Answer Assessment - Initial Assessment Questions  1. REASON FOR CALL or QUESTION: \"What is your reason for calling today?\" or \"How can I best  help you?\" or \"What question do you have that I can help answer?\"      Caller asking to speak to Dr. Phan  2. CALLER: Document the source of call. (e.g., laboratory, patient).      JO Upton    Protocols used: PCP CALL - NO TRIAGE-ADULT-      "

## 2021-04-10 NOTE — ED PROVIDER NOTES
Subjective   History of Present Illness    Patient is a 80-year-old male with PMH significant for Xarelto use, non-insulin-dependent diabetes, hypertension, arthritis, Burris catheter presenting to ED with failure to thrive.  Niece at bedside to provide additional history.  Patient reports he was seen in the ED earlier this morning and sent home at which time he attempted to use the commode on his own and could not get up.  Niece reports that patient has 1 bathroom in the house with supportive devices to help him stand and he accidentally went to the wrong one.  Niece describes that patient sat on the commode for so long his legs became weak and he could not get himself up at which time his wife called EMS for and assessed.  Family reports that they have been working to get patient placed at [facility] due to worsening failure to thrive, decreased activities of daily living, and increase of falls.  Patient reports that last night he had a fall where he injured his face, right elbow, left knee.  Patient reports that since going home he had increased soreness to the right elbow and left knee as well as worsening generalized weakness.  Niece at bedside reports that patient has the indwelling Burris catheter and has had a hard time caring for it and family has only been able to go over once a day to switch it.  Patient denies any further falls or injuries since his discharge earlier this morning.    Records reviewed show patient was seen in the ED on 3/29/2020 for close compression fracture of L2 lumbar vertebra, lumbosacral strain, acute urinary retention.  Patient was also seen earlier this morning and discharged around 5:30 AM with diagnosis of acute cystitis without hematuria.  At that time patient had a facial bone x-ray which showed: No fracture.  Patient was given a dose of 500 mg IM Rocephin and sent home with a prescription for cefdinir.    No previous urine cultures with growth.     Review of Systems    Constitutional: Negative for fever.   HENT: Negative.    Respiratory: Negative.  Negative for shortness of breath.    Cardiovascular: Negative.  Negative for chest pain.   Gastrointestinal: Negative.  Negative for abdominal pain, nausea and vomiting.   Genitourinary: Negative for hematuria.        Reports + difficulty with fuchs (bag disconnecting and overfilling)   Musculoskeletal: Positive for arthralgias (right elbow, left knee) and joint swelling (left knee). Negative for back pain and neck pain.   Skin: Positive for wound (abrasion to bridge of nose).   Neurological: Positive for weakness (generalized, worsening; bilateral LE weakness after prolonged period of sitting). Negative for syncope.        Reports + head injury (fall last night)  Denies LOC   Psychiatric/Behavioral: Negative for behavioral problems and confusion.   All other systems reviewed and are negative.      Past Medical History:   Diagnosis Date   • Arthritis    • Diabetes (CMS/HCC)    • High blood pressure    • Hx of colonic polyp    • Sleep apnea        No Known Allergies    Past Surgical History:   Procedure Laterality Date   • COLONOSCOPY N/A 12/13/2018    Procedure: COLONOSCOPY WITH ANESTHESIA;  Surgeon: Lauri Bourne MD;  Location: [facility] ENDOSCOPY;  Service: Gastroenterology   • COLONOSCOPY W/ POLYPECTOMY  09/22/2015    3 Tubular adenomas Ileocecal valve and at 80 cm repeat exam in 3 years   • EKOS CATHETER PLACEMENT Bilateral 1/15/2021    Procedure: Ekos catheter placement;  Surgeon: Raj Aldridge MD;  Location: [facility] CATH INVASIVE LOCATION;  Service: Cardiovascular;  Laterality: Bilateral;   • KNEE SURGERY         Family History   Problem Relation Age of Onset   • Diabetes Mother    • Colon cancer Neg Hx    • Colon polyps Neg Hx        Social History     Socioeconomic History   • Marital status:      Spouse name: Not on file   • Number of children: Not on file   • Years of education: Not on file   • Highest education  level: Not on file   Tobacco Use   • Smoking status: Never Smoker   • Smokeless tobacco: Never Used   Vaping Use   • Vaping Use: Never used   Substance and Sexual Activity   • Alcohol use: No   • Drug use: Never   • Sexual activity: Defer           Objective   Physical Exam  Vitals and nursing note reviewed.   Constitutional:       Appearance: Normal appearance. He is well-developed, well-groomed and overweight. He is ill-appearing. He is not diaphoretic.   HENT:      Head: Normocephalic.      Jaw: There is normal jaw occlusion.        Comments: Superficial healing abrasion noted to bridge of nose with no surrounding ecchymosis. No further facial or scalp injuries or reproducible tenderness to palpitation.      Right Ear: No hemotympanum.      Left Ear: No hemotympanum.      Nose: Signs of injury present. No nasal tenderness.      Right Sinus: No maxillary sinus tenderness or frontal sinus tenderness.      Left Sinus: No maxillary sinus tenderness or frontal sinus tenderness.      Mouth/Throat:      Mouth: Mucous membranes are moist.      Pharynx: Oropharynx is clear.   Eyes:      General: No scleral icterus.     Conjunctiva/sclera: Conjunctivae normal.      Pupils: Pupils are equal, round, and reactive to light.   Cardiovascular:      Rate and Rhythm: Regular rhythm. Tachycardia present.   Pulmonary:      Effort: Tachypnea present. No respiratory distress.      Breath sounds: Normal breath sounds. No wheezing or rales.   Abdominal:      General: Bowel sounds are normal. There is no distension.      Palpations: Abdomen is soft.      Tenderness: There is no abdominal tenderness. There is no right CVA tenderness or left CVA tenderness.   Musculoskeletal:      Comments: Mild swelling to left anterior knee with generalized tenderness to palpitation.     Right elbow with tenderness over the posterior aspect and limited ROM due to pain.     Remainder of extremities with no evidence of injury or abnormalities.    Skin:      General: Skin is warm.   Neurological:      General: No focal deficit present.      Mental Status: He is alert and oriented to person, place, and time.   Psychiatric:         Mood and Affect: Mood normal.         Behavior: Behavior normal. Behavior is cooperative.         Procedures           ED Course                                           MDM  Number of Diagnoses or Management Options     Amount and/or Complexity of Data Reviewed  Clinical lab tests: reviewed and ordered  Tests in the radiology section of CPT®: ordered and reviewed  Tests in the medicine section of CPT®: ordered and reviewed  Decide to obtain previous medical records or to obtain history from someone other than the patient: yes  Obtain history from someone other than the patient: yes (Niece)  Review and summarize past medical records: yes  Discuss the patient with other providers: yes (Dr. Dc Moody (attending)  Dr. Arroyo (hospitalist))    Patient Progress  Patient progress: stable    Patient is a 80-year-old male with PMH significant for Xarelto use, non-insulin-dependent diabetes, hypertension, arthritis, Burris catheter presenting to ED with increased difficulty caring for self. Patient seen earlier today in ED and diagnosed with UTI and increased falls. Patient unable to take his home anitbiotics and developed worsening weakness. Brought back in by niece. Family already looking into placement for 24-hour care and assistance with ADLs. Lab work with white blood cell count of 10.24, elevated relative neutrophils 80.8%, decreased relative lymphocytes 12.7%.  ANC 8.28.  CMP with hyperglycemia of 214 and no other acute abnormalities.  No anion gap.  Procalcitonin WNL at 0.11.  Lactic acid slightly elevated at 2.1.  PT/INR 15.8/1.3.  PTT 42.8.  Covid testing was negative.  Urine studies revealed: Moderate leukocytes, nitrite positive, 2 urobilinogen, 30 protein, 15 ketones, 612 RBC, 21-30 WBC, no bacteria, 36 g epithelium.  This is  improved from the urine studies earlier this morning which revealed 4+ bacteria.  Covid testing negative. Head CT without contrast shows: No CT evidence of acute intracranial process.  CT of the cervical spine without contrast shows: No CT evidence of acute bony injury to the cervical spine.  Multilevel disc degeneration and spondylosis.  Chest x-ray shows: No acute cardiopulmonary process.  Imaging of the right elbow and left knee with degenerative changes and no further acute osseous abnormalities. Patient remained slightly hypertensive and tachycardic while in ED.  Oxygen levels maintained around 94% room air.  Patient was unable to get up and ambulate due to weakness.  Patient was able to tolerate small p.o. sips of water.  Patient's Burris catheter bag was changed.  Patient given a dose of IV Rocephin after diagnosis of urinary tract infection earlier in the day and inability to take subsequent home medications. Patient and family contiue to express concern for patient's decreasing ADLs at home.  Case was discussed with Dr. Hernandez, hospitalist, who will kindly accept patient for observation status under his services.  Case was reviewed with Dr. Dc Moody with no further recommendations at this time.       Final diagnoses:   Generalized weakness   Falls frequently   Decreased activities of daily living (ADL)       ED Disposition  ED Disposition     None          No follow-up provider specified.       Medication List      No changes were made to your prescriptions during this visit.          Bogdan Vigil PA-C  04/10/21 0020

## 2021-04-10 NOTE — TELEPHONE ENCOUNTER
"Secure Brielle sent to Dr. Phan and advised to call on Monday.    Reason for Disposition  • Health Information question, no triage required and triager able to answer question    Additional Information  • Negative: [1] Caller is not with the adult (patient) AND [2] reporting urgent symptoms  • Negative: Lab result questions  • Negative: Medication questions  • Negative: Caller can't be reached by phone  • Negative: Caller has already spoken to PCP or another triager  • Negative: RN needs further essential information from caller in order to complete triage  • Negative: Requesting regular office appointment  • Negative: [1] Caller requesting NON-URGENT health information AND [2] PCP's office is the best resource    Answer Assessment - Initial Assessment Questions  1. REASON FOR CALL or QUESTION: \"What is your reason for calling today?\" or \"How can I best help you?\" or \"What question do you have that I can help answer?\"      Mrs. Andrews is requesting Nursing Home Placement for Mr. Andrews.    Protocols used: INFORMATION ONLY CALL - NO TRIAGE-ADULT-      "

## 2021-04-10 NOTE — PROGRESS NOTES
SW has been consulted for possible SNF placement. PT and family prefer [facility] or [facility] according to CM. ANGEL LUIS has contacted Lynn with [facility]. They do not have any beds available on their quarantine unit that PT would be required to go to upon initial admission, unless he has been vaccinated for COVID. SW has been advised that PT has not been vaccinated for COVID. ANGEL LUIS has sent a message to the admissions coordinator for [facility], but has not heard back. It is very difficult to make new SNF referrals on the weekend due to staffing at facilities. PT would need a qualifying 3 night inpatient stay in order to have Medicare cover SNF placement. PT is looking at being private pay otherwise. PT's niece states that they are not really wanting to be private pay at this time and much prefer PT to be admitted inpatient if at all possible. Will follow and assist as needed. ADRIANO Davis

## 2021-04-11 PROBLEM — R29.6 FREQUENT FALLS: Status: ACTIVE | Noted: 2021-01-01

## 2021-04-11 NOTE — THERAPY EVALUATION
Acute Care - Speech Language Pathology   Swallow Initial Evaluation [facility]     Patient Name: Dayday Andrews  : 1940  MRN: [id number]  Today's Date: 2021               Admit Date: 4/10/2021  Clinical bedside swallow evaluation completed. Full range of consistencies except mechanical soft solids presented. No overt s/s of aspiration were observed. Pt had adequate mastication and clearance of the regular solid. Pt initially reported occasional difficulty swallowing liquids, then later in the evaluation stated he sometimes has difficulty with cornbread. Again, no overt s/s of aspiration were observed with any consistency during assessment and pt's chest xray shows no acute process.   Recommend:  1. Regular solids and thin liquids  2. Meds whole with applesauce or pudding  3. Oral care BID and PRN  4. SLP will follow to monitor diet tolerance and for cognitive evaluation if needed  Suzette Schwartz, CCC-SLP 2021 11:14 CDT    Visit Dx:     ICD-10-CM ICD-9-CM   1. Generalized weakness  R53.1 780.79   2. Falls frequently  R29.6 V15.88   3. Decreased activities of daily living (ADL)  Z78.9 V49.89   4. Dysphagia, unspecified type  R13.10 787.20     Patient Active Problem List   Diagnosis   • Dysfunction of right eustachian tube   • Allergic rhinitis   • Sensorineural hearing loss (SNHL) of both ears   • Hx of adenomatous colonic polyps   • HTN (hypertension), benign   • Controlled type 2 diabetes mellitus without complication, without long-term current use of insulin (CMS/HCC)   • Paroxysmal atrial fibrillation (CMS/HCC)   • Atrial flutter (CMS/HCC)   • Acute respiratory failure with hypoxia (CMS/HCC)   • History of pulmonary embolism   • Inability to perform activities of daily living   • Dysmetria   • UTI (urinary tract infection) due to urinary indwelling catheter (CMS/HCC)   • Generalized weakness   • Ulcer of right foot, limited to breakdown of skin (CMS/HCC)   • Failure to thrive in adult   •  Dehydration with poor PO intake, hemoconcentration, tachycardia and dry mucus membranes   • Urinary retention   • Dysphagia   • Frequent falls     Past Medical History:   Diagnosis Date   • Arthritis    • Diabetes (CMS/HCC)    • High blood pressure    • Hx of colonic polyp    • Sleep apnea      Past Surgical History:   Procedure Laterality Date   • COLONOSCOPY N/A 12/13/2018    Procedure: COLONOSCOPY WITH ANESTHESIA;  Surgeon: Lauri Bourne MD;  Location: [facility] ENDOSCOPY;  Service: Gastroenterology   • COLONOSCOPY W/ POLYPECTOMY  09/22/2015    3 Tubular adenomas Ileocecal valve and at 80 cm repeat exam in 3 years   • EKOS CATHETER PLACEMENT Bilateral 1/15/2021    Procedure: Ekos catheter placement;  Surgeon: Raj Aldridge MD;  Location: [facility] CATH INVASIVE LOCATION;  Service: Cardiovascular;  Laterality: Bilateral;   • KNEE SURGERY          SWALLOW EVALUATION (last 72 hours)      SLP Adult Swallow Evaluation     Row Name 04/11/21 0952                   Rehab Evaluation    Document Type  evaluation  -MB        Subjective Information  no complaints  -MB        Patient Observations  alert;cooperative  -MB        Patient/Family/Caregiver Comments/Observations  Femal visitor at bedside  -MB           General Information    Patient Profile Reviewed  yes  -MB        Pertinent History Of Current Problem  Fall with abrasion to bridge of nose, failure to thrive, acute cystitis, L2 fracture on 03/29/21, CXR: no acute process, chronic indwelling Burris, DM, HTN, sleep apnea, sensorineural hearing loss. Reported dysphagia with liquids.   -MB        Current Method of Nutrition  regular textures;thin liquids  -MB        Precautions/Limitations, Vision  WFL;for purposes of eval  -MB        Precautions/Limitations, Hearing  hearing impairment, bilaterally  -MB        Prior Level of Function-Communication  cognitive-linguistic impairment;other (see comments) Visitor reports pt more confused today  -MB        Prior Level of  Function-Swallowing  no diet consistency restrictions  -MB        Plans/Goals Discussed with  patient and family  -MB        Barriers to Rehab  cognitive status;hearing deficit  -MB        Patient's Goals for Discharge  patient did not state  -MB        Family Goals for Discharge  family did not state  -MB           Pain    Additional Documentation  Pain Scale: FACES Pre/Post-Treatment (Group)  -MB           Pain Scale: FACES Pre/Post-Treatment    Pain: FACES Scale, Pretreatment  2-->hurts little bit  -MB           Oral Motor Structure and Function    Dentition Assessment  natural, present and adequate  -MB        Secretion Management  WNL/WFL  -MB        Mucosal Quality  moist, healthy  -MB           Oral Musculature and Cranial Nerve Assessment    Oral Motor General Assessment  oral labial or buccal impairment;lingual impairment  -MB        Oral Labial or Buccal Impairment, Detail, Cranial Nerve VII (Facial):  reduced ROM;reduced strength bilaterally  -MB        Lingual Impairment, Detail. Cranial Nerves IX, XII (Glossopharyngeal and Hypoglossal)  reduced lingual ROM;reduced strength;bilaterally  -MB           General Eating/Swallowing Observations    Respiratory Support Currently in Use  room air  -MB        Eating/Swallowing Skills  fed by SLP;self-fed  -MB        Positioning During Eating  upright in bed  -MB        Utensils Used  spoon;straw  -MB        Consistencies Trialed  regular textures;thin liquids;nectar/syrup-thick liquids;honey-thick liquids;pudding thick  -MB           Clinical Swallow Eval    Oral Prep Phase  WFL  -MB        Oral Transit  WFL  -MB        Oral Residue  WFL  -MB        Pharyngeal Phase  no overt signs/symptoms of pharyngeal impairment  -MB        Esophageal Phase  unremarkable  -MB        Clinical Swallow Evaluation Summary  Full range of consistencies except mechanical soft solids presented. No overt s/s of aspiration were observed. Pt had adequate mastication and clearance of the  regular solid. Pt initially reported occasional difficulty swallowing liquids, then later in the evaluation stated he sometimes has difficulty with cornbread. Again, no overt s/s of aspiration were observed with any consistency during assessment and pt's chest xray shows no acute process.   -MB           Clinical Impression    SLP Swallowing Diagnosis  functional oral phase;R/O pharyngeal dysphagia  -MB        Functional Impact  risk of aspiration/pneumonia;risk of malnutrition;risk of dehydration  -MB        Rehab Potential/Prognosis, Swallowing  good, to achieve stated therapy goals  -MB        Swallow Criteria for Skilled Therapeutic Interventions Met  demonstrates skilled criteria  -MB           Recommendations    Therapy Frequency (Swallow)  PRN  -MB        Predicted Duration Therapy Intervention (Days)  until discharge  -MB        SLP Diet Recommendation  regular textures;thin liquids  -MB        Recommended Diagnostics  SLE/Cog/Motor Speech Evaluation  -MB        Recommended Precautions and Strategies  upright posture during/after eating;small bites of food and sips of liquid;alternate between small bites of food and sips of liquid  -MB        Oral Care Recommendations  Oral Care BID/PRN  -MB        SLP Rec. for Method of Medication Administration  meds whole;with pudding or applesauce  -MB        Monitor for Signs of Aspiration  yes;cough;gurgly voice;throat clearing;pneumonia;notify SLP if any concerns  -MB        Anticipated Discharge Disposition (SLP)  skilled nursing facility  -MB           Swallow Goals (SLP)    Oral Nutrition/Hydration Goal Selection (SLP)  oral nutrition/hydration, SLP goal 1  -MB           Oral Nutrition/Hydration Goal 1 (SLP)    Oral Nutrition/Hydration Goal 1, SLP  Pt will tolerate least restrictive diet with no overt s/s of aspiration.   -MB        Time Frame (Oral Nutrition/Hydration Goal 1, SLP)  by discharge  -MB        Barriers (Oral Nutrition/Hydration Goal 1, SLP)  n/a  -MB         Progress/Outcomes (Oral Nutrition/Hydration Goal 1, SLP)  goal ongoing  -MB          User Key  (r) = Recorded By, (t) = Taken By, (c) = Cosigned By    Initials Name Effective Dates    Suzette Bunch, CCC-SLP 08/02/16 -           EDUCATION  The patient has been educated in the following areas:   Dysphagia (Swallowing Impairment).    SLP Recommendation and Plan  SLP Swallowing Diagnosis: functional oral phase, R/O pharyngeal dysphagia  SLP Diet Recommendation: regular textures, thin liquids  Recommended Precautions and Strategies: upright posture during/after eating, small bites of food and sips of liquid, alternate between small bites of food and sips of liquid  SLP Rec. for Method of Medication Administration: meds whole, with pudding or applesauce     Monitor for Signs of Aspiration: yes, cough, gurgly voice, throat clearing, pneumonia, notify SLP if any concerns  Recommended Diagnostics: SLE/Cog/Motor Speech Evaluation  Swallow Criteria for Skilled Therapeutic Interventions Met: demonstrates skilled criteria  Anticipated Discharge Disposition (SLP): skilled nursing facility  Rehab Potential/Prognosis, Swallowing: good, to achieve stated therapy goals  Therapy Frequency (Swallow): PRN  Predicted Duration Therapy Intervention (Days): until discharge                         Plan of Care Reviewed With: patient, family  Outcome Summary: See note    SLP GOALS     Row Name 04/11/21 0952             Oral Nutrition/Hydration Goal 1 (SLP)    Oral Nutrition/Hydration Goal 1, SLP  Pt will tolerate least restrictive diet with no overt s/s of aspiration.   -MB      Time Frame (Oral Nutrition/Hydration Goal 1, SLP)  by discharge  -MB      Barriers (Oral Nutrition/Hydration Goal 1, SLP)  n/a  -MB      Progress/Outcomes (Oral Nutrition/Hydration Goal 1, SLP)  goal ongoing  -MB        User Key  (r) = Recorded By, (t) = Taken By, (c) = Cosigned By    Initials Name Provider Type    Suzette Bunch, [facility]-SLP Speech  and Language Pathologist             Time Calculation:   Time Calculation- SLP     Row Name 04/11/21 1113             Time Calculation- SLP    SLP Start Time  0952  -MB      SLP Stop Time  1034  -MB      SLP Time Calculation (min)  42 min  -MB      SLP Received On  04/11/21  -MB      SLP Goal Re-Cert Due Date  04/21/21  -MB        User Key  (r) = Recorded By, (t) = Taken By, (c) = Cosigned By    Initials Name Provider Type    Suzette Bunch CCC-SLP Speech and Language Pathologist          Therapy Charges for Today     Code Description Service Date Service Provider Modifiers Qty    [id number] HC ST EVAL ORAL PHARYNG SWALLOW 3 4/11/2021 Suzette Schwartz CCC-SLP GN 1               DALE Santiago  4/11/2021

## 2021-04-11 NOTE — THERAPY EVALUATION
Patient Name: Dayday Andrews  : 1940    MRN: [id number]                              Today's Date: 2021       Admit Date: 4/10/2021    Visit Dx:     ICD-10-CM ICD-9-CM   1. Generalized weakness  R53.1 780.79   2. Falls frequently  R29.6 V15.88   3. Decreased activities of daily living (ADL)  Z78.9 V49.89   4. Dysphagia, unspecified type  R13.10 787.20     Patient Active Problem List   Diagnosis   • Dysfunction of right eustachian tube   • Allergic rhinitis   • Sensorineural hearing loss (SNHL) of both ears   • Hx of adenomatous colonic polyps   • HTN (hypertension), benign   • Controlled type 2 diabetes mellitus without complication, without long-term current use of insulin (CMS/HCC)   • Paroxysmal atrial fibrillation (CMS/HCC)   • Atrial flutter (CMS/HCC)   • Acute respiratory failure with hypoxia (CMS/HCC)   • History of pulmonary embolism   • Inability to perform activities of daily living   • Dysmetria   • UTI (urinary tract infection) due to urinary indwelling catheter (CMS/HCC)   • Generalized weakness   • Ulcer of right foot, limited to breakdown of skin (CMS/HCC)   • Failure to thrive in adult   • Dehydration with poor PO intake, hemoconcentration, tachycardia and dry mucus membranes   • Urinary retention   • Dysphagia   • Frequent falls     Past Medical History:   Diagnosis Date   • Arthritis    • Diabetes (CMS/HCC)    • High blood pressure    • Hx of colonic polyp    • Sleep apnea      Past Surgical History:   Procedure Laterality Date   • COLONOSCOPY N/A 2018    Procedure: COLONOSCOPY WITH ANESTHESIA;  Surgeon: Lauri Bourne MD;  Location: [facility] ENDOSCOPY;  Service: Gastroenterology   • COLONOSCOPY W/ POLYPECTOMY  2015    3 Tubular adenomas Ileocecal valve and at 80 cm repeat exam in 3 years   • EKOS CATHETER PLACEMENT Bilateral 1/15/2021    Procedure: Ekos catheter placement;  Surgeon: Raj Aldridge MD;  Location: [facility] CATH INVASIVE LOCATION;  Service: Cardiovascular;   Laterality: Bilateral;   • KNEE SURGERY       General Information     [facility] Name 04/11/21 1036          OT Time and Intention    Document Type  evaluation  -     Mode of Treatment  occupational therapy  -     Row Name 04/11/21 1036          General Information    Patient Profile Reviewed  yes  -     Prior Level of Function  independent:;ADL's;all household mobility;community mobility;driving past 2 weeks: reports using rwx for  & not able to get in shower, increased fear of falling, weakness  -     Existing Precautions/Restrictions  fall  -     Barriers to Rehab  cognitive status  -     Row Name 04/11/21 1036          Occupational Profile    Performance Patterns (Occupational Profile)  spouse is visual impaired & relies on pt to assist her to compensate outside the home  -     Environmental Supports and Barriers (Occupational Profile)  tub shower & small shower stall with seat, rwx, cane  -[facility] Name 04/11/21 1036          Living Environment    Lives With  spouse  -     Row Name 04/11/21 1036          Home Main Entrance    Number of Stairs, Main Entrance  four  -     Stair Railings, Main Entrance  railings on both sides of stairs  -     Row Name 04/11/21 1036          Stairs Within Home, Primary    Number of Stairs, Within Home, Primary  none  -     Stair Railings, Within Home, Primary  none  -     Row Name 04/11/21 1036          Cognition    Orientation Status (Cognition)  oriented to;person  -[facility] Name 04/11/21 1036          Safety Issues, Functional Mobility    Safety Issues Affecting Function (Mobility)  insight into deficits/self-awareness;judgment;at risk behavior observed  -     Impairments Affecting Function (Mobility)  cognition;endurance/activity tolerance;postural/trunk control;shortness of breath;strength  -     Cognitive Impairments, Mobility Safety/Performance  insight into deficits/self-awareness;judgment;problem-solving/reasoning;sequencing abilities  -        User Key  (r) = Recorded By, (t) = Taken By, (c) = Cosigned By    Initials Name Provider Type     Gala Boyer, OTR/L Occupational Therapist          Mobility/ADL's     Row Name 04/11/21 1036          Bed Mobility    Bed Mobility  supine-sit  -     Supine-Sit [place] (Bed Mobility)  moderate assist (50% patient effort);minimum assist (75% patient effort);1 person assist  -     Bed Mobility, Safety Issues  cognitive deficits limit understanding;decreased use of arms for pushing/pulling;decreased use of legs for bridging/pushing  -     Assistive Device (Bed Mobility)  head of bed elevated;bed rails  -     Row Name 04/11/21 1036          Transfers    Transfers  sit-stand transfer;bed-chair transfer  -     Bed-Chair [place] (Transfers)  minimum assist (75% patient effort);2 person assist  -     Assistive Device (Bed-Chair Transfers)  walker, front-wheeled  -     Sit-Stand [place] (Transfers)  minimum assist (75% patient effort);2 person assist  -     Row Name 04/11/21 1036          Sit-Stand Transfer    Assistive Device (Sit-Stand Transfers)  walker, front-wheeled  -     Row Name 04/11/21 1036          Functional Mobility    Functional Mobility- Safety Issues  balance decreased during turns;sequencing ability decreased;step length decreased;weight-shifting ability decreased;loses balance backward  -     Row Name 04/11/21 1036          Activities of Daily Living    BADL Assessment/Intervention  lower body dressing  -     Row Name 04/11/21 1036          Lower Body Dressing Assessment/Training    [place] Level (Lower Body Dressing)  maximum assist (25% patient effort);don;socks  -     Position (Lower Body Dressing)  edge of bed sitting  -       User Key  (r) = Recorded By, (t) = Taken By, (c) = Cosigned By    Initials Name Provider Type     Gala Boyer, OTR/L Occupational Therapist        Obj/Interventions     Row Name 04/11/21 1036          Range of Motion  Comprehensive    General Range of Motion  upper extremity range of motion deficits identified  -     Comment, General Range of Motion  bilat shoulders achieve 0-100, all other jts WFL for age & hx  -     Row Name 04/11/21 1036          Strength Comprehensive (MMT)    Comment, General Manual Muscle Testing (MMT) Assessment  BUEs 4/5  -     Row Name 04/11/21 1036          Balance    Balance Assessment  sitting static balance;sit to stand dynamic balance;standing static balance;standing dynamic balance  -     Static Sitting Balance  mild impairment  -     Sit to Stand Dynamic Balance  mild impairment;moderate impairment  -     Static Standing Balance  mild impairment;moderate impairment  -     Dynamic Standing Balance  mild impairment;moderate impairment  -     Balance Interventions  sitting;standing;sit to stand;supported;static;dynamic  -       User Key  (r) = Recorded By, (t) = Taken By, (c) = Cosigned By    Initials Name Provider Type     Gala Boyer, OTR/L Occupational Therapist        Goals/Plan     Row Name 04/11/21 1036          Transfer Goal 1 (OT)    Activity/Assistive Device (Transfer Goal 1, OT)  sit-to-stand/stand-to-sit;bed-to-chair/chair-to-bed;toilet  -     [place] Level/Cues Needed (Transfer Goal 1, OT)  contact guard assist  -     Time Frame (Transfer Goal 1, OT)  long term goal (LTG);by discharge  -     Progress/Outcome (Transfer Goal 1, OT)  goal ongoing  -[facility] Name 04/11/21 1036          Dressing Goal 1 (OT)    Activity/Device (Dressing Goal 1, OT)  lower body dressing  -     [place]/Cues Needed (Dressing Goal 1, OT)  minimum assist (75% or more patient effort)  -     Time Frame (Dressing Goal 1, OT)  long term goal (LTG);by discharge  -     Progress/Outcome (Dressing Goal 1, OT)  goal ongoing  -     Row Name 04/11/21 1036          Toileting Goal 1 (OT)    Activity/Device (Toileting Goal 1, OT)  toileting skills, all;adjust/manage  clothing;perform perineal hygiene  -     [place] Level/Cues Needed (Toileting Goal 1, OT)  minimum assist (75% or more patient effort)  -     Time Frame (Toileting Goal 1, OT)  long term goal (LTG);by discharge  -     Progress/Outcome (Toileting Goal 1, OT)  goal ongoing  -     Row Name 04/11/21 1036          Therapy Assessment/Plan (OT)    Planned Therapy Interventions (OT)  adaptive equipment training;activity tolerance training;BADL retraining;functional balance retraining;occupation/activity based interventions;patient/caregiver education/training;ROM/therapeutic exercise;strengthening exercise;transfer/mobility retraining  -       User Key  (r) = Recorded By, (t) = Taken By, (c) = Cosigned By    Initials Name Provider Type     Gala Boyer, OTR/L Occupational Therapist        Clinical Impression     Row Name 04/11/21 1036          Pain Assessment    Additional Documentation  Pain Scale: FACES Pre/Post-Treatment (Group)  -     Row Name 04/11/21 1036          Pain Scale: FACES Pre/Post-Treatment    Pain: FACES Scale, Pretreatment  0-->no hurt  -     Posttreatment Pain Rating  0-->no hurt  -     Row Name 04/11/21 1036          Plan of Care Review    Plan of Care Reviewed With  patient;family  -     Progress  no change  -     Outcome Summary  OT eval completed.  Pt is oriented to self, appears confused but is pleasant & agreeable to eval & skilled tx.  Mr. Andrews required Mod A to get to EOB, Max A to NATHANAEL socks & benefit from Min A x 2 for t/fs.  During t/fs he performed best with tactile cues for sequencing to take steps to chair.  He demo's posterior lean, weakness, & decreased cognition.  Mr. Andrews would benefit from cont'd OT tx to improve his indep in self care & fxl mob.  Anticipate DC home.  -     Row Name 04/11/21 1036          Therapy Assessment/Plan (OT)    Rehab Potential (OT)  fair, will monitor progress closely  -     Criteria for Skilled Therapeutic Interventions Met  (OT)  yes;skilled treatment is necessary  -     Therapy Frequency (OT)  5 times/wk  -     Row Name 04/11/21 1036          Therapy Plan Review/Discharge Plan (OT)    Equipment Needs Upon Discharge (OT)  commode chair  -     Anticipated Discharge Disposition (OT)  skilled nursing facility  -     Row Name 04/11/21 1036          Positioning and Restraints    Pre-Treatment Position  in bed  -     Post Treatment Position  chair  -     In Chair  reclined;sitting;call light within reach;encouraged to call for assist;legs elevated  -       User Key  (r) = Recorded By, (t) = Taken By, (c) = Cosigned By    Initials Name Provider Type     Gala Boyer, OTR/L Occupational Therapist        Outcome Measures     Row Name 04/11/21 1036          How much help from another is currently needed...    Putting on and taking off regular lower body clothing?  2  -CH     Bathing (including washing, rinsing, and drying)  2  -CH     Toileting (which includes using toilet bed pan or urinal)  2  -CH     Putting on and taking off regular upper body clothing  2  -CH     Taking care of personal grooming (such as brushing teeth)  3  -CH     Eating meals  3  -CH     AM-PAC 6 Clicks Score (OT)  14  -     Row Name 04/11/21 1036          Functional Assessment    Outcome Measure Options  AM-PAC 6 Clicks Daily Activity (OT)  -       User Key  (r) = Recorded By, (t) = Taken By, (c) = Cosigned By    Initials Name Provider Type     Gala Boyer, OTR/L Occupational Therapist        Occupational Therapy Education                 Title: PT OT SLP Therapies (In Progress)     Topic: Occupational Therapy (In Progress)     Point: ADL training (Done)     Description:   Instruct learner(s) on proper safety adaptation and remediation techniques during self care or transfers.   Instruct in proper use of assistive devices.              Learning Progress Summary           Patient Acceptance, E,D, VU,NR by  at 4/11/2021 [id number]                    Point: Home exercise program (Not Started)     Description:   Instruct learner(s) on appropriate technique for monitoring, assisting and/or progressing therapeutic exercises/activities.              Learner Progress:  Not documented in this visit.          Point: Precautions (Not Started)     Description:   Instruct learner(s) on prescribed precautions during self-care and functional transfers.              Learner Progress:  Not documented in this visit.          Point: Body mechanics (Not Started)     Description:   Instruct learner(s) on proper positioning and spine alignment during self-care, functional mobility activities and/or exercises.              Learner Progress:  Not documented in this visit.                      User Key     Initials Effective Dates Name Provider Type Discipline     07/05/20 -  Gala Boyer, OTR/L Occupational Therapist OT              OT Recommendation and Plan  Planned Therapy Interventions (OT): adaptive equipment training, activity tolerance training, BADL retraining, functional balance retraining, occupation/activity based interventions, patient/caregiver education/training, ROM/therapeutic exercise, strengthening exercise, transfer/mobility retraining  Therapy Frequency (OT): 5 times/wk  Plan of Care Review  Plan of Care Reviewed With: patient, family  Progress: no change  Outcome Summary: OT eval completed.  Pt is oriented to self, appears confused but is pleasant & agreeable to eval & skilled tx.  Mr. Andrews required Mod A to get to EOB, Max A to NATHANAEL socks & benefit from Min A x 2 for t/fs.  During t/fs he performed best with tactile cues for sequencing to take steps to chair.  He demo's posterior lean, weakness, & decreased cognition.  Mr. Andrews would benefit from cont'd OT tx to improve his indep in self care & fxl mob.  Anticipate DC home.     Time Calculation:   Time Calculation- OT     Row Name 04/11/21 1036             Time Calculation- OT    OT Start Time  1036 add  10 min for chart review  -      OT Stop Time  1120  -      OT Time Calculation (min)  44 min  -      OT Received On  04/11/21  -      OT Goal Re-Cert Due Date  04/21/21  -        User Key  (r) = Recorded By, (t) = Taken By, (c) = Cosigned By    Initials Name Provider Type     Gala Boyer, OTR/L Occupational Therapist        Therapy Charges for Today     Code Description Service Date Service Provider Modifiers Qty    [id number]  OT EVAL MOD COMPLEXITY 4 4/11/2021 Gala Boyer, OTR/L GO 1               Gala Boyer OTR/L  4/11/2021

## 2021-04-11 NOTE — PLAN OF CARE
Goal Outcome Evaluation:  Plan of Care Reviewed With: patient  Progress: no change  Outcome Summary: Pt had no c/o pain this shift. Safety maintained. Pt confused to place and situation. Betadine applied to right 3rd toe. Pt turned and repositioned Q2 hours and PRN. No new skin breakdown noted. Tele on. IV infusing per MD order.  Neurosx consulted. CT completed this shift, awaiting results. Accu checks ACHS. Sliding scale insuin administered as ordered. Tele on. No neuro changes. Cont to monitor.

## 2021-04-11 NOTE — ED PROVIDER NOTES
Subjective   Mr. Andrews is a 80-year-old gentleman who presents the hospital status post fall with nasal abrasion on the bridge of his nose.  He states that he has been unsteady on his feet and had increased amounts of falls over the last couple weeks.  He does not have any type of neurologic change she is able to move all extremities without issues with good sensation and tone bilaterally and symmetrically and has not lost control of his bowels or bladder.  Patient has a chronic indwelling Burris and his indwelling Burris has looked more discolored over the last few days.          Review of Systems   Constitutional: Negative for chills and fever.   HENT: Positive for facial swelling. Negative for congestion, ear pain and sinus pressure.    Eyes: Negative for photophobia, pain and visual disturbance.   Respiratory: Negative for cough, chest tightness, shortness of breath and wheezing.    Cardiovascular: Negative for chest pain and palpitations.   Gastrointestinal: Negative for abdominal pain, diarrhea and nausea.   Endocrine: Negative for cold intolerance and heat intolerance.   Genitourinary: Positive for dysuria. Negative for difficulty urinating and urgency.   Musculoskeletal: Negative for arthralgias, joint swelling and myalgias.   Skin: Negative for color change and wound.   Neurological: Negative for dizziness and headaches.   Hematological: Negative for adenopathy. Does not bruise/bleed easily.   Psychiatric/Behavioral: Negative for agitation, behavioral problems, confusion and decreased concentration.       Past Medical History:   Diagnosis Date   • Arthritis    • Diabetes (CMS/HCC)    • High blood pressure    • Hx of colonic polyp    • Sleep apnea        No Known Allergies    Past Surgical History:   Procedure Laterality Date   • COLONOSCOPY N/A 12/13/2018    Procedure: COLONOSCOPY WITH ANESTHESIA;  Surgeon: Lauri Bourne MD;  Location: [facility] ENDOSCOPY;  Service: Gastroenterology   • COLONOSCOPY W/  POLYPECTOMY  09/22/2015    3 Tubular adenomas Ileocecal valve and at 80 cm repeat exam in 3 years   • EKOS CATHETER PLACEMENT Bilateral 1/15/2021    Procedure: Ekos catheter placement;  Surgeon: Raj Aldridge MD;  Location:  [facility] CATH INVASIVE LOCATION;  Service: Cardiovascular;  Laterality: Bilateral;   • KNEE SURGERY         Family History   Problem Relation Age of Onset   • Diabetes Mother    • Colon cancer Neg Hx    • Colon polyps Neg Hx        Social History     Socioeconomic History   • Marital status:      Spouse name: Not on file   • Number of children: Not on file   • Years of education: Not on file   • Highest education level: Not on file   Tobacco Use   • Smoking status: Never Smoker   • Smokeless tobacco: Never Used   Vaping Use   • Vaping Use: Never used   Substance and Sexual Activity   • Alcohol use: No   • Drug use: Never   • Sexual activity: Defer           Objective   Physical Exam  Vitals and nursing note reviewed.   Constitutional:       Appearance: Normal appearance. He is well-developed.   HENT:      Head: Normocephalic and atraumatic.   Eyes:      Extraocular Movements: Extraocular movements intact.      Conjunctiva/sclera: Conjunctivae normal.      Pupils: Pupils are equal, round, and reactive to light.   Cardiovascular:      Rate and Rhythm: Normal rate and regular rhythm.      Heart sounds: Normal heart sounds.   Pulmonary:      Effort: Pulmonary effort is normal.      Breath sounds: Normal breath sounds.   Abdominal:      General: Bowel sounds are normal.      Palpations: Abdomen is soft.      Tenderness: There is no abdominal tenderness.   Musculoskeletal:         General: Normal range of motion.      Cervical back: Normal range of motion and neck supple.   Skin:     General: Skin is warm and dry.      Findings: No rash.   Neurological:      General: No focal deficit present.      Mental Status: He is alert and oriented to person, place, and time.      Cranial Nerves: No  cranial nerve deficit.      Deep Tendon Reflexes: Reflexes are normal and symmetric.   Psychiatric:         Behavior: Behavior normal.         Thought Content: Thought content normal.         Procedures           ED Course  ED Course as of Apr 10 2054   Sat Apr 10, 2021   2054 Discussed with patient be admitted to the hospital versus going home the patient states that he feels central going home prefer to take antibiotics and follow-up with his doctor.    [RW]      ED Course User Index  [RW] Horace Starr MD                                           [facility]    Final diagnoses:   Acute cystitis without hematuria       ED Disposition  ED Disposition     ED Disposition Condition Comment    Discharge Stable           Johnson Phan MD  [address] [place] [address] [address]  [facility] [id number] [phone]    In 3 days           Medication List      New Prescriptions    cefdinir 300 MG capsule  Commonly known as: OMNICEF  Take 1 capsule by mouth 2 (Two) Times a Day.           Where to Get Your Medications      These medications were sent to [facility] - [place], [place] - [address] - [phone]  - [phone] FX  [address] [facility], [facility] [address]    Phone: [phone]   · cefdinir 300 MG capsule          Horace Starr MD  04/10/21 2054

## 2021-04-11 NOTE — H&P
[facility] Medicine Services  HISTORY AND PHYSICAL    Date of Admission: 4/10/2021  Primary Care Physician: Johnson Phan MD    Subjective     Chief Complaint: falls    History of Present Illness    Mr. Andrews is a 81 yo M with a past medical history of diabetes, hypertension.  Recently, the patient has been suffering from frequent falls, and had on 3/29 a fall with an L2 fracture.  At that time the patient also was suffering from urinary retention, and continues to suffer from this despite seeing urology in clinic and attempting to remove the Burris.  On 3/29, he had an MRI of his lower back, that did not show any signs of cauda equina syndrome or cord compression.  However, over the past few weeks, the patient has continued to have frequent falls and have significant lower extremities.  Today, the patient went to the bathroom, and he has an elevated toilet seat, and he was unable to get back off the toliet.  Since his fall his weakness has been progressive in nature and is now severe.  The patient prior to a few weeks ago was walking around the mall and ambulating fairly well.     Patient is noted to have some small diabetic ulcers on his left foot that do not appear infected.        Review of Systems   Constitutional: Positive for activity change, appetite change and fatigue. Negative for chills and fever.   HENT: Negative for congestion, dental problem, drooling and voice change.    Respiratory: Negative for cough and shortness of breath.    Cardiovascular: Negative for chest pain and palpitations.   Gastrointestinal: Negative for abdominal distention, abdominal pain, diarrhea, nausea and vomiting.   Genitourinary: Positive for difficulty urinating.   Musculoskeletal: Negative for arthralgias and myalgias.   Skin: Positive for wound.   Neurological: Positive for weakness.   All other systems reviewed and are negative.       Otherwise complete ROS reviewed and negative  except as mentioned in the HPI.    Past Medical History:   Past Medical History:   Diagnosis Date   • Arthritis    • Diabetes (CMS/HCC)    • High blood pressure    • Hx of colonic polyp    • Sleep apnea      Past Surgical History:  Past Surgical History:   Procedure Laterality Date   • COLONOSCOPY N/A 12/13/2018    Procedure: COLONOSCOPY WITH ANESTHESIA;  Surgeon: Lauri Bourne MD;  Location: [facility] ENDOSCOPY;  Service: Gastroenterology   • COLONOSCOPY W/ POLYPECTOMY  09/22/2015    3 Tubular adenomas Ileocecal valve and at 80 cm repeat exam in 3 years   • EKOS CATHETER PLACEMENT Bilateral 1/15/2021    Procedure: Ekos catheter placement;  Surgeon: Raj Aldridge MD;  Location: [facility] CATH INVASIVE LOCATION;  Service: Cardiovascular;  Laterality: Bilateral;   • KNEE SURGERY       Social History:  reports that he has never smoked. He has never used smokeless tobacco. He reports that he does not drink alcohol and does not use drugs.    Family History: family history includes Diabetes in his mother.       Allergies:  No Known Allergies  Medications:  Prior to Admission medications    Medication Sig Start Date End Date Taking? Authorizing Provider   allopurinol (ZYLOPRIM) 300 MG tablet Take 300 mg by mouth.    Provider, MD Sheryl   amLODIPine (NORVASC) 5 MG tablet Take 1 tablet by mouth Daily. 1/19/21   Timoteo, BRIANNA Robertson   atorvastatin (LIPITOR) 20 MG tablet Take 1 tablet by mouth Daily. 2/22/21   Raj Aldridge MD   bisoprolol (ZEBeta) 10 MG tablet Take 10 mg by mouth Daily.    Provider, MD Sheryl   buPROPion XL (Wellbutrin XL) 300 MG 24 hr tablet Take 1 tablet by mouth Daily. 2/22/21   Raj Aldridge MD   cefdinir (OMNICEF) 300 MG capsule Take 1 capsule by mouth 2 (Two) Times a Day. 4/10/21   Horace Starr MD   clotrimazole-betamethasone (LOTRISONE) 1-0.05 % cream Apply  topically to the appropriate area as directed 2 (Two) Times a Day for 14 days. After cleaning the affected area bid for 14  "days 4/5/21 4/19/21  Farrukh Velasquez PA   cyclobenzaprine (FLEXERIL) 10 MG tablet Take 1 tablet by mouth 3 (Three) Times a Day As Needed for Muscle Spasms. 3/30/21   Nixon Gibbons MD   Diclofenac Sodium (VOLTAREN) 1 % gel gel Apply 4 g topically to the appropriate area as directed 4 (Four) Times a Day As Needed (knee).    Sheryl Pulliam MD   doxazosin (CARDURA) 8 MG tablet Take 8 mg by mouth.    Sheryl Pulliam MD   doxepin (SINEquan) 10 MG capsule Take 10 mg by mouth Daily.    Sheryl Pulliam MD   furosemide (LASIX) 40 MG tablet Take 40 mg by mouth Daily.    Sheryl Pulliam MD   glimepiride (AMARYL) 4 MG tablet Take 4 mg by mouth 2 (two) times a day.    Sheryl Pulliam MD   HYDROcodone-acetaminophen (NORCO) 5-325 MG per tablet Take 1 tablet by mouth Every 6 (Six) Hours As Needed for Severe Pain . 3/30/21   Nixon Gibbons MD   irbesartan (AVAPRO) 300 MG tablet Take 150 mg by mouth Daily.    Sheryl Pulliam MD   letrozole (FEMARA) 2.5 MG tablet Take  by mouth Daily.    Sheryl Pulliam MD   metFORMIN ER (GLUCOPHAGE-XR) 500 MG 24 hr tablet Take 500 mg by mouth 3 (Three) Times a Day. 10/30/17   Sheryl Pulliam MD   pentoxifylline (TRENtal) 400 MG CR tablet 400 mg 2 (two) times a day. 10/30/17   Sheryl Pulliam MD   rivaroxaban (XARELTO) 20 MG tablet Take 1 tablet by mouth Daily With Dinner. START AFTER COMPLETION OF 15 MG BID X 21 DAYS (SAMPLES OF 15 MG TABS PROVIDED TO PT) 1/19/21   Knees, Jayda EBRIANNA   tamsulosin (FLOMAX) 0.4 MG capsule 24 hr capsule Take 1 capsule by mouth Daily. 3/30/21   Nixon Gibbons MD   tamsulosin (FLOMAX) 0.4 MG capsule 24 hr capsule Take 1 capsule by mouth Every Night for 60 days. 4/5/21 6/4/21  Farrukh Velasquez PA     Objective     Vital Signs: /86   Pulse 104   Temp 97.6 °F (36.4 °C) (Oral)   Resp 24   Ht 185.4 cm (73\")   Wt 108 kg (239 lb)   SpO2 94%   BMI 31.53 kg/m²   Physical Exam  Vitals " and nursing note reviewed.   HENT:      Head: Normocephalic and atraumatic.      Nose: No congestion or rhinorrhea.      Mouth/Throat:      Mouth: Mucous membranes are dry.      Pharynx: Oropharynx is clear.      Comments: Dry mucus membranes  Eyes:      General: No scleral icterus.     Conjunctiva/sclera: Conjunctivae normal.   Cardiovascular:      Rate and Rhythm: Regular rhythm. Tachycardia present.      Heart sounds: Murmur heard.     Pulmonary:      Effort: Pulmonary effort is normal. No respiratory distress.      Breath sounds: Normal breath sounds. No stridor.   Abdominal:      General: Abdomen is flat. Bowel sounds are normal. There is no distension.      Palpations: Abdomen is soft. There is no mass.      Tenderness: There is no abdominal tenderness.      Hernia: No hernia is present.   Musculoskeletal:         General: No swelling or tenderness.   Skin:     General: Skin is warm and dry.      Coloration: Skin is not jaundiced or pale.   Neurological:      Mental Status: He is alert and oriented to person, place, and time.      Cranial Nerves: Cranial nerve deficit present.      Sensory: No sensory deficit.      Motor: Weakness present.      Coordination: Coordination abnormal.      Gait: Gait abnormal (shuffling).      Deep Tendon Reflexes: Reflexes normal.      Comments: Dysmetria upper and lower on R > L; weakness RUE/LUE R > L (3-4/5); mild cogwheeling upper extremities; tongue fasiculations rhythmic   Psychiatric:         Mood and Affect: Mood normal.         Behavior: Behavior normal.               Results Reviewed:  Lab Results (last 24 hours)     Procedure Component Value Units Date/Time    CK [[id number]]  (Normal) Collected: 04/10/21 1354    Specimen: Blood Updated: 04/10/21 1825     Creatine Kinase 44 U/L     Urinalysis With Culture If Indicated - Urine, Clean Catch [[id number]]  (Abnormal) Collected: 04/10/21 1443    Specimen: Urine, Clean Catch Updated: 04/10/21 1533     Color, UA Yellow      Appearance, UA Clear     pH, UA 7.5     Specific Gravity, UA 1.024     Glucose, UA Negative     Ketones, UA 15 mg/dL (1+)     Bilirubin, UA Negative     Blood, UA Large (3+)     Protein, UA 30 mg/dL (1+)     Leuk Esterase, UA Moderate (2+)     Nitrite, UA Positive     Urobilinogen, UA 2.0 E.U./dL    Urinalysis, Microscopic Only - Urine, Clean Catch [[id number]]  (Abnormal) Collected: 04/10/21 1443    Specimen: Urine, Clean Catch Updated: 04/10/21 1533     RBC, UA 6-12 /HPF      WBC, UA 21-30 /HPF      Bacteria, UA None Seen /HPF      Squamous Epithelial Cells, UA 3-6 /HPF      Hyaline Casts, UA None Seen /LPF      Methodology Manual Light Microscopy    Urine Culture - Urine, Urine, Clean Catch [[id number]] Collected: 04/10/21 1443    Specimen: Urine, Clean Catch Updated: 04/10/21 1533    COVID-19,[employer] IN-HOUSE,Nasal Swab No Transport Media 3-4 HR TAT - Swab, Nasal Cavity [[id number]]  (Normal) Collected: 04/10/21 1355    Specimen: Swab from Nasal Cavity Updated: 04/10/21 1439     COVID19 Not Detected    Narrative:      Fact sheet for providers: https://www.fda.gov/media/570723/download     Fact sheet for patients: https://www.fda.gov/media/731981/download    Test performed by PCR.    Consider negative results in combination with clinical observations, patient history, and epidemiological information.    BNP [[id number]]  (Normal) Collected: 04/10/21 1354    Specimen: Blood Updated: 04/10/21 1438     proBNP 476.3 pg/mL     Narrative:      Among patients with dyspnea, NT-proBNP is highly sensitive for the detection of acute congestive heart failure. In addition NT-proBNP of <300 pg/ml effectively rules out acute congestive heart failure with 99% negative predictive value.    Results may be falsely decreased if patient taking Biotin.      Lactic Acid, Plasma [[id number]]  (Abnormal) Collected: 04/10/21 1354    Specimen: Blood Updated: 04/10/21 1437     Lactate 2.1 mmol/L     Procalcitonin [[id number]]  (Normal)  "Collected: 04/10/21 1354    Specimen: Blood Updated: 04/10/21 1433     Procalcitonin 0.11 ng/mL     Narrative:      As a Marker for Sepsis (Non-Neonates):     1. <0.5 ng/mL represents a low risk of severe sepsis and/or septic shock.  2. >2 ng/mL represents a high risk of severe sepsis and/or septic shock.    As a Marker for Lower Respiratory Tract Infections that require antibiotic therapy:  PCT on Admission     Antibiotic Therapy             6-12 Hrs later  >0.5                          Strongly Recommended            >0.25 - <0.5             Recommended  0.1 - 0.25                  Discouraged                       Remeasure/reassess PCT  <0.1                         Strongly Discouraged         Remeasure/reassess PCT      As 28 day mortality risk marker: \"Change in Procalcitonin Result\" (>80% or <=80%) if Day 0 (or Day 1) and Day 4 values are available. Refer to http://www.[employer].pct-calculator.com/    Change in PCT <=80 %   A decrease of PCT levels below or equal to 80% defines a positive change in PCT test result representing a higher risk for 28-day all-cause mortality of patients diagnosed with severe sepsis or septic shock.    Change in PCT >80 %   A decrease of PCT levels of more than 80% defines a negative change in PCT result representing a lower risk for 28-day all-cause mortality of patients diagnosed with severe sepsis or septic shock.              Results may be falsely decreased if patient taking Biotin.     Comprehensive Metabolic Panel [[id number]]  (Abnormal) Collected: 04/10/21 1354    Specimen: Blood Updated: 04/10/21 1428     Glucose 214 mg/dL      BUN 12 mg/dL      Creatinine 0.98 mg/dL      Sodium 140 mmol/L      Potassium 4.3 mmol/L      Chloride 101 mmol/L      CO2 27.0 mmol/L      Calcium 9.6 mg/dL      Total Protein 6.6 g/dL      Albumin 3.60 g/dL      ALT (SGPT) 14 U/L      AST (SGOT) 13 U/L      Alkaline Phosphatase 118 U/L      Total Bilirubin 0.5 mg/dL      eGFR Non African Amer 74 " mL/min/1.73      Globulin 3.0 gm/dL      A/G Ratio 1.2 g/dL      BUN/Creatinine Ratio 12.2     Anion Gap 12.0 mmol/L     Narrative:      GFR Normal >60  Chronic Kidney Disease <60  Kidney Failure <15      aPTT [[id number]]  (Abnormal) Collected: 04/10/21 1355    Specimen: Blood Updated: 04/10/21 1417     PTT 42.8 seconds     Protime-INR [[id number]]  (Abnormal) Collected: 04/10/21 1355    Specimen: Blood Updated: 04/10/21 1417     Protime 15.8 Seconds      INR 1.30    CBC & Differential [[id number]]  (Abnormal) Collected: 04/10/21 1354    Specimen: Blood Updated: 04/10/21 1409    Narrative:      The following orders were created for panel order CBC & Differential.  Procedure                               Abnormality         Status                     ---------                               -----------         ------                     CBC Auto Differential[[id number]]        Abnormal            Final result                 Please view results for these tests on the individual orders.    CBC Auto Differential [[id number]]  (Abnormal) Collected: 04/10/21 1354    Specimen: Blood Updated: 04/10/21 1409     WBC 10.24 10*3/mm3      RBC 4.78 10*6/mm3      Hemoglobin 14.0 g/dL      Hematocrit 40.8 %      MCV 85.4 fL      MCH 29.3 pg      MCHC 34.3 g/dL      RDW 14.1 %      RDW-SD 43.8 fl      MPV 9.4 fL      Platelets 226 10*3/mm3      Neutrophil % 80.8 %      Lymphocyte % 12.7 %      Monocyte % 5.3 %      Eosinophil % 0.3 %      Basophil % 0.4 %      Immature Grans % 0.5 %      Neutrophils, Absolute 8.28 10*3/mm3      Lymphocytes, Absolute 1.30 10*3/mm3      Monocytes, Absolute 0.54 10*3/mm3      Eosinophils, Absolute 0.03 10*3/mm3      Basophils, Absolute 0.04 10*3/mm3      Immature Grans, Absolute 0.05 10*3/mm3      nRBC 0.0 /100 WBC     Blood Culture - Blood, Arm, Left [[id number]] Collected: 04/10/21 1355    Specimen: Blood from Arm, Left Updated: 04/10/21 1408    Blood Culture - Blood, Arm, Right [[id number]] Collected:  04/10/21 1355    Specimen: Blood from Arm, Right Updated: 04/10/21 1407        Imaging Results (Last 24 Hours)     Procedure Component Value Units Date/Time    XR Chest 1 View [[id number]] Collected: 04/10/21 1641     Updated: 04/10/21 1645    Narrative:      EXAMINATION: XR CHEST 1 VW-. 4/10/2021 4:41 PM CDT     CHEST, ONE VIEW:     HISTORY: Pain post fall     COMPARISON: 3/29/2021 and 1/15/2021 chest radiography     A single frontal chest radiograph was obtained.     FINDINGS:     The level of inspiration is relatively shallow and lung volumes  diminished.     The lungs are clear without infiltrates. There are no pleural effusions  or pneumothoraces.     The heart is normal in size, pulmonary circulation appropriate, without  heart failure.     The bony structures are intact without acute osseous abnormality.                                     Impression:      1. No acute cardiopulmonary process.     This report was finalized on 04/10/2021 16:42 by Dr. Josué Patterson MD.    XR Knee 1 or 2 View Bilateral [[id number]] Collected: 04/10/21 1640     Updated: 04/10/21 1644    Narrative:      EXAMINATION:  XR KNEE 1 OR 2 VW BILATERAL-  4/10/2021 2:45 PM CDT     HISTORY: fall pain     COMPARISON: No comparison study.     TECHNIQUE: 4 views: AP lateral projection imaging of both knees were  obtained.     FINDINGS:      Patella femoral and tibial relationships are appropriate without  fracture.     Osteoarthritic changes identified in both knees particularly the  patellofemoral and medial joint space compartments, greater on the  right.     Small joint effusion suspected in the suprapatellar regions bilaterally.     Arterial calcifications noted posteriorly       Impression:      1. No acute osseous abnormality.  2. Small joint effusions.  3. Degenerative changes.  This report was finalized on 04/10/2021 16:41 by Dr. Josué Patterson MD.    XR Elbow 3+ View Right [[id number]] Collected: 04/10/21 1639     Updated: 04/10/21 1643     Narrative:      EXAMINATION:  XR ELBOW 3+ VW RIGHT-  4/10/2021 2:47 PM CDT     HISTORY: fall pain     COMPARISON: No comparison study.     TECHNIQUE: 3 views: AP lateral oblique projection imaging     FINDINGS:      The bony structures are intact. The joint spaces are maintained.     There is no fracture or dislocation.     Degenerative changes identified with osteophyte formation along the  ulnar humeral joint coronoid spurring. Spurring also noted along the  radial head neck junction.     No significant joint effusion identified.       Impression:      1. No acute osseous abnormality.  2. Degenerative changes.  This report was finalized on 04/10/2021 16:40 by Dr. Josué Patterson MD.    CT Cervical Spine Without Contrast [[id number]] Collected: 04/10/21 1444     Updated: 04/10/21 1455    Narrative:      EXAMINATION:  CT CERVICAL SPINE WO CONTRAST-  4/10/2021 2:15 PM CDT     HISTORY: fall onto face. Neck pain     COMPARISON: No comparison study.     TECHNIQUE: Radiation dose equals  mGy-cm.  Automated exposure  control dose reduction technique was implemented.     Thin section axial imaging was obtained without intravenous contrast.  2-D sagittal and coronal reconstruction images were generated.     FINDINGS:      Facets are appropriately aligned with hypertrophic changes.     There is spondylosis changes with ridging anterior osteophyte and  uncinate hypertrophy with mild disc space narrowing C5-C6 and to a  lesser degree C4-C5 and C6-C7.     Vertebral body heights are maintained. The prevertebral soft tissues are  normal.     There is no fracture or subluxation.     There is mild posterior osteophyte and disc complexes at C5-C6, C6-C7  and C4-C5 without significant canal stenosis. There is multilevel  foraminal narrowing particularly at C3-C4, C4-C5 and C5-C6.     There is no CT evidence of significant posttraumatic disc herniation.      Carotid artery calcifications identified.       Impression:      1. No CT  evidence of acute bony injury to the cervical spine.  2. Multilevel disc degeneration and spondylosis.  This report was finalized on 04/10/2021 14:52 by Dr. Josué Patterson MD.    CT Head Without Contrast [[id number]] Collected: 04/10/21 1441     Updated: 04/10/21 1446    Narrative:      EXAMINATION: CT HEAD WO CONTRAST-  4/10/2021 2:41 PM CDT     CT SCAN OF THE HEAD, WITHOUT CONTRAST:      HISTORY: Head pain post fall/head trauma     COMPARISONS: 3/29/2021 head CT      TECHNIQUE:     Radiation dose equals  mGy-cm.  Automated exposure control dose  reduction technique was implemented.        CT evaluation of the head without intravenous contrast. 5 mm transaxial  images were obtained.   2-D sagittal and coronal reconstruction images  were generated.     FINDINGS:      There is no intra or extra-axial hemorrhage.     There is no mass, mass effect or midline shift.     There is no developing hydrocephalus.     There is central and cortical atrophy.     Low-attenuation in the periventricular subcortical white matter observed  compatible with mild chronic ischemic changes.     There is no skull fracture or acute calvarial abnormality.     Paranasal sinuses imaged incompletely are clear.     CT appearance of the head is unchanged.             Impression:      1. No CT evidence of an acute intracranial process.                              This report was finalized on 04/10/2021 14:43 by Dr. Josué Patterson MD.        I have personally reviewed and interpreted the radiology studies and ECG obtained at time of admission.     Assessment / Plan     Assessment:   Active Hospital Problems    Diagnosis    • **Dehydration with poor PO intake, hemoconcentration, tachycardia and dry mucus membranes    • Inability to perform activities of daily living    • Dysmetria    • UTI (urinary tract infection) due to urinary indwelling catheter (CMS/[facility])    • Generalized weakness    • Ulcer of right foot, limited to breakdown of skin  "(CMS/HCC)    • Failure to thrive in adult    • Urinary retention    • History of pulmonary embolism      Jan '21 - submassive PE started on DOAC and treated with EKOS catheter-directed thrombolytics  Seemed \"unprovoked\" - but we asked that his PCP perform malignancy screening as outpatient     • Paroxysmal atrial fibrillation (CMS/HCC)    • HTN (hypertension), benign    • Controlled type 2 diabetes mellitus without complication, without long-term current use of insulin (CMS/HCC)    • Sensorineural hearing loss (SNHL) of both ears        Plan:     Patient will be admitted to my service here by his [facility].  Patient over the last couple of weeks has had frequent falls, lower extremities weakness, and urinary retention, and failed Fuchs removal at the urologist office this past week.    On examination, the patient is noted to be tachycardic, dry mucous membranes, and clinically with labs he is hemoconcentrated and ketones in his urine.  Suspect that patient is dehydrated, has had very poor p.o. intake, and reportedly dysphagia with liquids.  Will give LR at 75 mL/hr.  Speech consult.      MRI of brain for weakness of lower extremities and dysphagia.      Continue fuchs catheter.  Change out fuchs catheter.  Continue ceftriaxone.  Urinary culture is pending.     For the patient's foot ulcers, paint with betadine     Sliding scale insulin with qac/qhs with accuchecks.  Carbohydrate consistent diet.    Xarelto for recent PE, also sufficient for VTE PPx.    Incentive spirometer.      PT/OT/SLP    SW consult for placement     AM labs          Code Status: DNR/DNI     I discussed the patient's findings and my recommendations with the patient, patient's family, bedside RN, ER provider Donnie.    Estimated length of stay >2 days    Patient seen and examined by me on 4/10/2021 at 1902.    Electronically signed by Darin Hernandez MD, 04/10/21, 19:02 CDT            "

## 2021-04-11 NOTE — CONSULTS
Reason for consult compression fracture    Chief Complaint   Patient presents with   • Failure To Thrive         Requesting provider: Dr. Lee    HPI: 80-year-old gentleman with a couple week history of several falls, L2 compression fracture, urinary retention.  Who presented to the emergency room on 3/29 originally after a fall and complaint of back pain.  Patient was diagnosed with an L2 superior endplate fracture with no retropulsion or canal stenosis at that time.  He was discharged he did follow-up with urology on 5 April.  He has had several falls since then.  Who presented the emergency room yesterday for continued urinary retention and continued falls in the family essentially could not get him off of the toilet due to  weakness.  The patient currently cannot provide any reasonable history.  He says his back is sore.    Review of Systems   Musculoskeletal: Positive for gait problem.   Psychiatric/Behavioral: Positive for confusion.   All other systems reviewed and are negative.       Pertinent positives/negatives documented in HPI.  All other systems reviewed and negative.    Past Medical History:  has a past medical history of Arthritis, Diabetes (CMS/[facility]), High blood pressure, colonic polyp, and Sleep apnea.    Past Surgical History:  has a past surgical history that includes Knee surgery; Colonoscopy w/ polypectomy (09/22/2015); Colonoscopy (N/A, 12/13/2018); and EKOS Catheter Placement (Bilateral, 1/15/2021).    Family History: family history includes Diabetes in his mother.    Social History:  reports that he has never smoked. He has never used smokeless tobacco. He reports that he does not drink alcohol and does not use drugs.    Allergies: Patient has no known allergies.    Medications: Scheduled Meds:cefTRIAXone, 1 g, Intravenous, Q24H  insulin lispro, 0-9 Units, Subcutaneous, TID AC  rivaroxaban, 20 mg, Oral, Daily With Dinner  sodium chloride, 10 mL, Intravenous, Q12H      Continuous  "Infusions:lactated ringers, 75 mL/hr, Last Rate: 75 mL/hr (04/11/21 0650)      PRN Meds:.•  acetaminophen  •  dextrose  •  dextrose  •  glucagon (human recombinant)  •  ondansetron **OR** ondansetron  •  [COMPLETED] Insert peripheral IV **AND** sodium chloride  •  sodium chloride     Objective:  Vital signs: (most recent): Blood pressure 149/79, pulse 80, temperature 97.6 °F (36.4 °C), temperature source Temporal, resp. rate 18, height 185.4 cm (73\"), weight 102 kg (225 lb 12.8 oz), SpO2 96 %.    Lungs:  He is not in respiratory distress.    Heart: Normal rate.  Regular rhythm.    Abdomen: Abdomen is soft and non-distended.        Neurologic Exam     Mental Status   Attention: normal.   Speech: speech is normal   Level of consciousness: alert  Knowledge: good.   Patient is awake and alert but oriented only to self.  He is easily confused.     Cranial Nerves     CN II   Visual fields full to confrontation.     CN III, IV, VI   Pupils are equal, round, and reactive to light.  Extraocular motions are normal.     CN V   Facial sensation intact.     CN VII   Facial expression full, symmetric.     CN VIII   CN VIII normal.     CN IX, X   CN IX normal.   CN X normal.     CN XI   CN XI normal.     CN XII   CN XII normal.     Motor Exam   Muscle bulk: normal  Overall muscle tone: normal  Right arm pronator drift: absent  Left arm pronator drift: absent    Strength   Strength 5/5 throughout. Lower extremity strength bilaterally and bedside exam is 5 out of 5 in all flexion-extension movements.     Sensory Exam   Light touch normal.   Pinprick normal.     Gait, Coordination, and Reflexes     Coordination   Finger to nose coordination: normal    Tremor   Resting tremor: absent  Intention tremor: absent  Action tremor: absent    Reflexes   Reflexes 2+ except as noted.   Right brachioradialis: 1+  Left brachioradialis: 1+  Right biceps: 1+  Left biceps: 1+  Right patellar: 1+  Left patellar: 1+  Right achilles: 1+  Left achilles: " 1+  Right Burrows: absent  Left Burrows: absent  Right ankle clonus: absent  Left pendular knee jerk: absentAble to go from sitting to standing with moderate assistance.  Patient persistently leans backwards and will not take a step.    No upper extremity weakness.  No upper extremity dysmetria dysdiadochokinesia.       Vital Signs  Temp:  [97.5 °F (36.4 °C)-98.2 °F (36.8 °C)] 97.6 °F (36.4 °C)  Heart Rate:  [] 80  Resp:  [18] 18  BP: (137-174)/(62-83) 149/79    Physical Exam  Eyes:      Extraocular Movements: EOM normal.      Pupils: Pupils are equal, round, and reactive to light.   Cardiovascular:      Rate and Rhythm: Normal rate and regular rhythm.   Pulmonary:      Effort: No respiratory distress.      Breath sounds: Normal breath sounds.   Abdominal:      General: There is no distension.      Palpations: Abdomen is soft.   Neurological:      Coordination: Finger-Nose-Finger Test normal.      Deep Tendon Reflexes: Strength normal.      Reflex Scores:       Bicep reflexes are 1+ on the right side and 1+ on the left side.       Brachioradialis reflexes are 1+ on the right side and 1+ on the left side.       Patellar reflexes are 1+ on the right side and 1+ on the left side.       Achilles reflexes are 1+ on the right side and 1+ on the left side.  Psychiatric:         Speech: Speech normal.         Results Review:   I reviewed the patient's new clinical results.  I reviewed the patient's new imaging results and agree with the interpretation.  I reviewed the patient's other test results and agree with the interpretation          Lab Results (last 24 hours)     Procedure Component Value Units Date/Time    Blood Culture - Blood, Arm, Left [[id number]] Collected: 04/10/21 1355    Specimen: Blood from Arm, Left Updated: 04/11/21 1415     Blood Culture No growth at 24 hours    Blood Culture - Blood, Arm, Right [[id number]] Collected: 04/10/21 1355    Specimen: Blood from Arm, Right Updated: 04/11/21 1415     Blood  Culture No growth at 24 hours    POC Glucose Once [[id number]]  (Abnormal) Collected: 04/11/21 1138    Specimen: Blood Updated: 04/11/21 1150     Glucose 169 mg/dL      Comment: : [id number] Edward AmandaMeter ID: [id number]       Urine Culture - Urine, Urine, Clean Catch [[id number]]  (Normal) Collected: 04/10/21 1443    Specimen: Urine, Clean Catch Updated: 04/11/21 1135     Urine Culture No growth    POC Glucose Once [[id number]]  (Normal) Collected: 04/11/21 0825    Specimen: Blood Updated: 04/11/21 0838     Glucose 116 mg/dL      Comment: : [id number] Edward AmandaMeter ID: [id number]       TSH [[id number]]  (Normal) Collected: 04/11/21 0619    Specimen: Blood Updated: 04/11/21 0711     TSH 0.865 uIU/mL     Basic Metabolic Panel [[id number]]  (Abnormal) Collected: 04/11/21 0619    Specimen: Blood Updated: 04/11/21 0711     Glucose 130 mg/dL      BUN 10 mg/dL      Creatinine 0.90 mg/dL      Sodium 140 mmol/L      Potassium 3.6 mmol/L      Chloride 99 mmol/L      CO2 29.0 mmol/L      Calcium 9.1 mg/dL      eGFR Non African Amer 82 mL/min/1.73      BUN/Creatinine Ratio 11.1     Anion Gap 12.0 mmol/L     Narrative:      GFR Normal >60  Chronic Kidney Disease <60  Kidney Failure <15      Phosphorus [[id number]]  (Normal) Collected: 04/11/21 0619    Specimen: Blood Updated: 04/11/21 0708     Phosphorus 3.8 mg/dL     Hemoglobin A1c [[id number]]  (Abnormal) Collected: 04/11/21 0620    Specimen: Blood Updated: 04/11/21 0708     Hemoglobin A1C 8.30 %     Narrative:      Hemoglobin A1C Ranges:    Increased Risk for Diabetes  5.7% to 6.4%  Diabetes                     >= 6.5%  Diabetic Goal                < 7.0%    Magnesium [[id number]]  (Normal) Collected: 04/11/21 0619    Specimen: Blood Updated: 04/11/21 0706     Magnesium 2.1 mg/dL     CBC Auto Differential [[id number]]  (Abnormal) Collected: 04/11/21 0620    Specimen: Blood Updated: 04/11/21 0646     WBC 7.48 10*3/mm3      RBC 4.20 10*6/mm3      Hemoglobin 12.3  g/dL      Hematocrit 36.4 %      MCV 86.7 fL      MCH 29.3 pg      MCHC 33.8 g/dL      RDW 14.0 %      RDW-SD 44.2 fl      MPV 9.0 fL      Platelets 208 10*3/mm3      Neutrophil % 71.3 %      Lymphocyte % 20.1 %      Monocyte % 6.7 %      Eosinophil % 0.8 %      Basophil % 0.7 %      Immature Grans % 0.4 %      Neutrophils, Absolute 5.34 10*3/mm3      Lymphocytes, Absolute 1.50 10*3/mm3      Monocytes, Absolute 0.50 10*3/mm3      Eosinophils, Absolute 0.06 10*3/mm3      Basophils, Absolute 0.05 10*3/mm3      Immature Grans, Absolute 0.03 10*3/mm3      nRBC 0.0 /100 WBC     POC Glucose Once [[id number]]  (Abnormal) Collected: 04/10/21 2103    Specimen: Blood Updated: 04/10/21 2133     Glucose 155 mg/dL      Comment: : [id number] Lila Gerard (Drury) AnnMeter ID: [id number]               Assessment/Plan:   CT scan of the cervical spine shows age-related spondylosis.  MRI of the brain does not show any acute issues only age-related changes.  MRI of the lumbar spine from March 30, 2021 shows a L2 superior endplate fracture with no significant neuroforaminal compromise or compression.    1.  Compression fracture.  Patient complains of some low back pain but given his disorientation and cognitive issues hard to say whether back pain is significant obstacle to his gait.  Certainly the imaging from March 30 does not suggest cauda equina syndrome or any explanation for his weakness or difficulty walking.  The patient is not myelopathic.  I would recommend repeating the CT scan of the lumbar spine and adding a CT scan of the thoracic spine to see if there is any other injury previously not recognized any progression of the L2 compression fracture.  2.  Urinary retention.  I think this is likely related to the patient's dementia/cognitive issues.  Nothing on the imaging done to this point suggests a neurologic explanation for his urinary retention.  We will repeat the CT scan of the lumbar spine and order CT scan of thoracic  spine to complete the evaluation.          Frequent falls    Sensorineural hearing loss (SNHL) of both ears    HTN (hypertension), benign    Controlled type 2 diabetes mellitus without complication, without long-term current use of insulin (CMS/[facility])    Paroxysmal atrial fibrillation (CMS/[facility])    History of pulmonary embolism    Inability to perform activities of daily living    Dysmetria    UTI (urinary tract infection) due to urinary indwelling catheter (CMS/HCC)    Generalized weakness    Ulcer of right foot, limited to breakdown of skin (CMS/[facility])    Failure to thrive in adult    Dehydration with poor PO intake, hemoconcentration, tachycardia and dry mucus membranes    Urinary retention    Dysphagia      I discussed the patients findings and my recommendations with patient    Giuseppe Diop MD  04/11/21  14:37 CDT    Time: More than 50% of time spent in counseling and coordination of care:  Total face-to-face/floor time 45 min.  Time spent in counseling 25 min. Counseling included the following topics: Diagnosis, condition, treatment, and plan

## 2021-04-11 NOTE — PLAN OF CARE
Goal Outcome Evaluation:  Plan of Care Reviewed With: patient, family  Progress: no change  Outcome Summary: OT eval completed.  Pt is oriented to self, appears confused but is pleasant & agreeable to eval & skilled tx.  Mr. Andrews required Mod A to get to EOB, Max A to NATHANAEL socks & benefit from Min A x 2 for t/fs.  During t/fs he performed best with tactile cues for sequencing to take steps to chair.  He demo's posterior lean, weakness, & decreased cognition.  Mr. Andrews would benefit from cont'd OT tx to improve his indep in self care & fxl mob.  Anticipate DC home.

## 2021-04-11 NOTE — PLAN OF CARE
Goal Outcome Evaluation:  Plan of Care Reviewed With: patient  Progress: no change  Outcome Summary: patient disoriented to place and situation, wound to coccyx and 3rd toe to L foot, scabs to both elbows and bridge of nose, unsteady gait, on bedrest at this time, fuchs catheter in place, niece states it was changed on monday of this week, confusion has worsened as the night has gone on and patient has not slept, keeps pulling continuous pulse ox and fuchs statlock off and states that he is at home, also has a dressing to left flank that is dry and intact, per report from ER nurse it was a skin tag/black head that was expressed in the ER

## 2021-04-11 NOTE — PLAN OF CARE
Goal Outcome Evaluation:  Plan of Care Reviewed With: patient, family     Outcome Summary: See note

## 2021-04-11 NOTE — PROGRESS NOTES
[facility]  INPATIENT PROGRESS NOTE    Patient Name: Dayday Andrews  Date of Admission: 4/10/2021  Today's Date: 04/11/21  Length of Stay: 1  Primary Care Physician: Johnson Phan MD    Subjective   Chief Complaint: Follow-up fall/UTI    HPI   She is seen sitting in chair at bedside.  His daughter is also at bedside.  He is awake alert interactive.  Has a flat affect and seems confused and a poor historian.  He denies any overt complaints.  He knows him in [place] but otherwise not oriented.  Does not know the month or year.  Patient has had a BM today despite ongoing urinary retention.  Was able to inform the staff and we had a bedpan to go without having fecal incontinence.    Review of Systems   Patient is not a reliable historian but overall review of systems is negative.     Objective    Temp:  [97.5 °F (36.4 °C)-98.2 °F (36.8 °C)] 98.2 °F (36.8 °C)  Heart Rate:  [] 77  Resp:  [18-24] 18  BP: (137-174)/(62-86) 156/69  Physical Exam  GEN: Awake, alert, interactive, in NAD  HEENT: Atraumatic, PERRLA, EOMI, Anicteric, Trachea midline  Lungs: CTAB, no wheezing/rales/rhonchi  Heart: RRR, +S1/s2, no rub  ABD: soft, nt/nd, +BS, no guarding/rebound  Extremities:no cyanosis, no edema  Neuro: AAOx2, no obvious focal deficits, no clonus  Psych: normal mood & affect        Results Review:  I have reviewed the labs, radiology results, and diagnostic studies.    Laboratory Data:   Results from last 7 days   Lab Units 04/11/21  0620 04/10/21  1354   WBC 10*3/mm3 7.48 10.24   HEMOGLOBIN g/dL 12.3* 14.0   HEMATOCRIT % 36.4* 40.8   PLATELETS 10*3/mm3 208 226        Results from last 7 days   Lab Units 04/11/21  0619 04/10/21  1354   SODIUM mmol/L 140 140   POTASSIUM mmol/L 3.6 4.3   CHLORIDE mmol/L 99 101   CO2 mmol/L 29.0 27.0   BUN mg/dL 10 12   CREATININE mg/dL 0.90 0.98   CALCIUM mg/dL 9.1 9.6   BILIRUBIN mg/dL  --  0.5   ALK PHOS U/L  --  118*   ALT (SGPT)  U/L  --  14   AST (SGOT) U/L  --  13   GLUCOSE mg/dL 130* 214*       Culture Data:   No results found for: BLOODCX, URINECX, WOUNDCX, MRSACX, RESPCX, STOOLCX    Radiology Data:   Imaging Results (Last 24 Hours)     Procedure Component Value Units Date/Time    MRI Brain Without Contrast [[id number]] Collected: 04/11/21 1005     Updated: 04/11/21 1012    Narrative:      EXAMINATION:  MRI BRAIN WO CONTRAST-  4/11/2021 9:02 AM CDT     HISTORY: Neuro deficit, acute, stroke suspected      COMPARISON: Head CT dated 4/10/2021     TECHNIQUE: MR imaging the brain was performed.     FINDINGS:      Significant image quality degradation related to patient motion.  Multiple sequences were repeated.     There is no diffusion signal abnormality to indicate an acute ischemic  event/area of infarction.     There is central and cortical atrophy.     There are punctate and confluent periventricular and subcortical FLAIR  signal white matter hyperintensities that are nonspecific, chronic  ischemic changes most likely.     There is no definite abnormal intra-axial extra-axial blood products  identified.  No definite flow void appreciated, suboptimally imaged on the gradient  echo imaging sequences.     There is no mass effect or midline shift.     There is no hydrocephalus.     The globes and intraorbital structures are imaged symmetrically.     Pituitary gland is not enlarged.     Cervical spine imaged in part is unremarkable.       Impression:      1. No MR evidence of an acute intracranial process.  2. Atrophy.  3. Probable chronic ischemic white matter changes.  This report was finalized on 04/11/2021 10:09 by Dr. Josué Patterson MD.    XR Chest 1 View [[id number]] Collected: 04/10/21 1641     Updated: 04/10/21 1645    Narrative:      EXAMINATION: XR CHEST 1 VW-. 4/10/2021 4:41 PM CDT     CHEST, ONE VIEW:     HISTORY: Pain post fall     COMPARISON: 3/29/2021 and 1/15/2021 chest radiography     A single frontal chest radiograph was  obtained.     FINDINGS:     The level of inspiration is relatively shallow and lung volumes  diminished.     The lungs are clear without infiltrates. There are no pleural effusions  or pneumothoraces.     The heart is normal in size, pulmonary circulation appropriate, without  heart failure.     The bony structures are intact without acute osseous abnormality.                                     Impression:      1. No acute cardiopulmonary process.     This report was finalized on 04/10/2021 16:42 by Dr. Josué Patterson MD.    XR Knee 1 or 2 View Bilateral [[id number]] Collected: 04/10/21 1640     Updated: 04/10/21 1644    Narrative:      EXAMINATION:  XR KNEE 1 OR 2 VW BILATERAL-  4/10/2021 2:45 PM CDT     HISTORY: fall pain     COMPARISON: No comparison study.     TECHNIQUE: 4 views: AP lateral projection imaging of both knees were  obtained.     FINDINGS:      Patella femoral and tibial relationships are appropriate without  fracture.     Osteoarthritic changes identified in both knees particularly the  patellofemoral and medial joint space compartments, greater on the  right.     Small joint effusion suspected in the suprapatellar regions bilaterally.     Arterial calcifications noted posteriorly       Impression:      1. No acute osseous abnormality.  2. Small joint effusions.  3. Degenerative changes.  This report was finalized on 04/10/2021 16:41 by Dr. Josué Patterson MD.    XR Elbow 3+ View Right [[id number]] Collected: 04/10/21 1639     Updated: 04/10/21 1643    Narrative:      EXAMINATION:  XR ELBOW 3+ VW RIGHT-  4/10/2021 2:47 PM CDT     HISTORY: fall pain     COMPARISON: No comparison study.     TECHNIQUE: 3 views: AP lateral oblique projection imaging     FINDINGS:      The bony structures are intact. The joint spaces are maintained.     There is no fracture or dislocation.     Degenerative changes identified with osteophyte formation along the  ulnar humeral joint coronoid spurring. Spurring also noted  "along the  radial head neck junction.     No significant joint effusion identified.       Impression:      1. No acute osseous abnormality.  2. Degenerative changes.  This report was finalized on 04/10/2021 16:40 by Dr. Josué Patterson MD.          I have reviewed the patient's current medications.     Assessment/Plan     Active Hospital Problems    Diagnosis    • **Frequent falls    • Inability to perform activities of daily living    • Dysmetria    • UTI (urinary tract infection) due to urinary indwelling catheter (CMS/HCC)    • Generalized weakness    • Ulcer of right foot, limited to breakdown of skin (CMS/HCC)    • Failure to thrive in adult    • Dehydration with poor PO intake, hemoconcentration, tachycardia and dry mucus membranes    • Urinary retention    • Dysphagia    • History of pulmonary embolism      Jan '21 - submassive PE started on DOAC and treated with EKOS catheter-directed thrombolytics  Seemed \"unprovoked\" - but we asked that his PCP perform malignancy screening as outpatient     • Paroxysmal atrial fibrillation (CMS/[facility])    • HTN (hypertension), benign    • Controlled type 2 diabetes mellitus without complication, without long-term current use of insulin (CMS/[facility])    • Sensorineural hearing loss (SNHL) of both ears        Patient appears to have failure to thrive with likely underlying dementia that could be progressing.  He also has evidence for dehydration and UTI.    Continue antibiotics and gentle IV fluids.  Encourage p.o. intake.    Due to ongoing urinary retention issues post fall and endplate fracture will ask neurosurgery to evaluate.  No obvious red flag neurosurgical issues.  Previous MRI imaging had no spinal cord compromise.    Continue Xarelto for now for previous history of proximal A. fib and pulmonary embolism.  MRI of the brain on arrival was negative for stroke or bleed.      Discharge Planning: Unclear dispo at this time.  Patient is unable to ambulate well.  Not safe for " discharge home.  Potentially needs placement.  Case management aware and trying to work on placement.    Electronically signed by Jhoan Lee DO, 04/11/21, 11:07 CDT.

## 2021-04-11 NOTE — PROGRESS NOTES
Discharge Planning Assessment  [facility]     Patient Name: Dayday Andrews  MRN: [id number]  Today's Date: 4/11/2021    Admit Date: 4/10/2021    Discharge Needs Assessment     Row Name 04/11/21 1035       Living Environment    Lives With  spouse    Current Living Arrangements  home/apartment/condo    Primary Care Provided by  self    Provides Primary Care For  no one    Family Caregiver if Needed  other relative(s)    Quality of Family Relationships  helpful;involved;supportive    Able to Return to Prior Arrangements  yes       Resource/Environmental Concerns    Resource/Environmental Concerns  none    Transportation Concerns  car, none       Transition Planning    Patient/Family Anticipates Transition to  inpatient rehabilitation facility    Patient/Family Anticipated Services at Transition  rehabilitation services    Transportation Anticipated  family or friend will provide       Discharge Needs Assessment    Readmission Within the Last 30 Days  no previous admission in last 30 days    Equipment Currently Used at Home  walker, rolling;cpap;grab bar    Concerns to be Addressed  no discharge needs identified;denies needs/concerns at this time    Discharge Coordination/Progress  Pt has RX coverage and a PCP. SW received consult stating that pt needs placement. Pt is in obs status at this time. SW is unsure if facilities are doing the 3 day covid waiver at this time. SW can contact admissions office tomorrow to determine this due to admissions not being in facilities on weekends. SW will be in contact with facilities tomorrow.        Discharge Plan    No documentation.       Continued Care and Services - Admitted Since 4/10/2021    Coordination has not been started for this encounter.         Demographic Summary    No documentation.       Functional Status    No documentation.       Psychosocial    No documentation.       Abuse/Neglect    No documentation.       Legal    No documentation.       Substance Abuse    No  documentation.       Patient Forms    No documentation.           Coral Delvalle

## 2021-04-12 NOTE — PLAN OF CARE
Goal Outcome Evaluation:  Plan of Care Reviewed With: patient  Progress: no change  Outcome Summary: PT paigeal completed.  Pt crying out for Leydi, his wife right before therapy entered room.  Attempted to comfort pt and discussed w/ RN who informed pt she was going to try to call his wife soon.  Pt fearful of moving and demonstrated resistance requiring much encouragement for participation.  Pt required assist of 2 for bed mobility, tfers and activity in standing w/ use of rwx.  Pt impulsive once steps initiated turning to sit and sit w/out warning.  Pt only tolerated taking steps bed to chair this date.   Pt education for safety/falls prevention.  Pt will benefit from continued PT services to improve knowledge of spinal precautions, safety/falls prevention, to improve activity tolerance, balance, knowledge re: use of AD, and to improve I w/ functional mobility.  Recommend continued skilled care in SNF at discharge.   Will follow for pt progress.

## 2021-04-12 NOTE — PLAN OF CARE
Goal Outcome Evaluation:  Plan of Care Reviewed With: patient  Progress: no change  Outcome Summary: c/o back pain at times, tylenol given with relief, patient disoriented to time and situation and somtimes disoriented to place, weak, pulls at lines and tubes, LR infusing, turning, multiple scabs from previous falls, small wound to coccyx and necrotic area to L 3rd toe, chronic fuchs in place, fuchs care q4h, anchor in place at this time, patient has pulled anchor off multiple times, bed alarm set, safety maintained

## 2021-04-12 NOTE — PLAN OF CARE
Goal Outcome Evaluation:     Progress: no change  Outcome Summary: Ntn assessment. Pt with reported decreased appeite and wt change upon admission. CCHO diet, oral intake not available to review at this time. Boost Glucose Control BID. Cont to follow for POC.

## 2021-04-12 NOTE — THERAPY TREATMENT NOTE
Acute Care - Occupational Therapy Treatment Note  [facility]     Patient Name: Dayday Andrews  : 1940  MRN: [id number]  Today's Date: 2021             Admit Date: 4/10/2021       ICD-10-CM ICD-9-CM   1. Generalized weakness  R53.1 780.79   2. Falls frequently  R29.6 V15.88   3. Decreased activities of daily living (ADL)  Z78.9 V49.89   4. Dysphagia, unspecified type  R13.10 787.20     Patient Active Problem List   Diagnosis   • Dysfunction of right eustachian tube   • Allergic rhinitis   • Sensorineural hearing loss (SNHL) of both ears   • Hx of adenomatous colonic polyps   • HTN (hypertension), benign   • Controlled type 2 diabetes mellitus without complication, without long-term current use of insulin (CMS/[facility])   • Paroxysmal atrial fibrillation (CMS/HCC)   • Atrial flutter (CMS/HCC)   • Acute respiratory failure with hypoxia (CMS/[facility])   • History of pulmonary embolism   • Inability to perform activities of daily living   • Dysmetria   • UTI (urinary tract infection) due to urinary indwelling catheter (CMS/HCC)   • Generalized weakness   • Ulcer of right foot, limited to breakdown of skin (CMS/HCC)   • Failure to thrive in adult   • Dehydration with poor PO intake, hemoconcentration, tachycardia and dry mucus membranes   • Urinary retention   • Dysphagia   • Frequent falls     Past Medical History:   Diagnosis Date   • Arthritis    • Diabetes (CMS/[facility])    • High blood pressure    • Hx of colonic polyp    • Sleep apnea      Past Surgical History:   Procedure Laterality Date   • COLONOSCOPY N/A 2018    Procedure: COLONOSCOPY WITH ANESTHESIA;  Surgeon: Lauri Bourne MD;  Location: [facility] ENDOSCOPY;  Service: Gastroenterology   • COLONOSCOPY W/ POLYPECTOMY  2015    3 Tubular adenomas Ileocecal valve and at 80 cm repeat exam in 3 years   • EKOS CATHETER PLACEMENT Bilateral 1/15/2021    Procedure: Ekos catheter placement;  Surgeon: Raj Aldridge MD;  Location: [facility] CATH INVASIVE  LOCATION;  Service: Cardiovascular;  Laterality: Bilateral;   • KNEE SURGERY              OT ASSESSMENT FLOWSHEET (last 12 hours)      OT Evaluation and Treatment     Row Name 04/12/21 3387                   OT Time and Intention    Subjective Information  -- pt upset reporting confusion and fear of hospital upon enter  -        Document Type  therapy note (daily note)  -        Mode of Treatment  occupational therapy  -           General Information    Existing Precautions/Restrictions  fall;spinal  -MW           Cognition    Follows Commands (Cognition)  follows one-step commands;25-49% accuracy;delayed response/completion;increased processing time needed;physical/tactile prompts required;repetition of directions required;verbal cues/prompting required  -        Personal Safety Interventions  fall prevention program maintained;gait belt;muscle strengthening facilitated;nonskid shoes/slippers when out of bed;supervised activity  -           Pain Assessment    Additional Documentation  Pain Scale: FACES Pre/Post-Treatment (Group)  -           Pain Scale: Numbers Pre/Post-Treatment    Pretreatment Pain Rating  --  -MW        Posttreatment Pain Rating  --  -           Pain Scale: FACES Pre/Post-Treatment    Pain: FACES Scale, Pretreatment  2-->hurts little bit  -MW        Posttreatment Pain Rating  4-->hurts little more  -        Pre/Posttreatment Pain Comment  reports increased low back pain with mobility  -MW           Bed Mobility    Bed Mobility  rolling left;sidelying-sit  -        Rolling Left [place] (Bed Mobility)  moderate assist (50% patient effort);2 person assist;verbal cues;nonverbal cues (demo/gesture)  -        Sidelying-Sit [place] (Bed Mobility)  moderate assist (50% patient effort);verbal cues;nonverbal cues (demo/gesture);2 person assist  -        Bed Mobility, Safety Issues  cognitive deficits limit understanding;decreased use of arms for pushing/pulling;decreased  use of legs for bridging/pushing  -MW        Assistive Device (Bed Mobility)  bed rails  -MW        Comment (Bed Mobility)  required much encouragement and therapeutic use of self to calm pt   -MW           Functional Mobility    Functional Mobility- Ind. Level  moderate assist (50% patient effort);2 person assist required;verbal cues required;nonverbal cues required (demo/gesture)  -MW        Functional Mobility- Device  rolling walker  -MW        Functional Mobility- Comment  took steps to chair but impulsively sits too soon requiring assist to control to chair  -MW           Transfer Assessment/Treatment    Transfers  sit-stand transfer;stand-sit transfer  -MW        Comment (Transfers)  impulsively sits before near chair requiring assist to control to chair  -MW           Transfers    Bed-Chair [place] (Transfers)  minimum assist (75% patient effort);moderate assist (50% patient effort);2 person assist;verbal cues;nonverbal cues (demo/gesture)  -MW        Assistive Device (Bed-Chair Transfers)  walker, front-wheeled  -MW        Sit-Stand [place] (Transfers)  moderate assist (50% patient effort);2 person assist;verbal cues;nonverbal cues (demo/gesture)  -MW        Stand-Sit [place] (Transfers)  moderate assist (50% patient effort);2 person assist;verbal cues;nonverbal cues (demo/gesture)  -MW           Sit-Stand Transfer    Assistive Device (Sit-Stand Transfers)  walker, front-wheeled  -MW           Activities of Daily Living    BADL Assessment/Intervention  lower body dressing  -MW           Lower Body Dressing Assessment/Training    [place] Level (Lower Body Dressing)  dependent (less than 25% patient effort);socks  -MW        Position (Lower Body Dressing)  edge of bed sitting  -MW           Wound 04/10/21 2231 Left coccyx    Wound - Properties Group Placement Date: 04/10/21  -AD Placement Time: 2231 -AD Present on Hospital Admission: Y  -AD Side: Left  -AD Location: coccyx  -AD    Retired  Wound - Properties Group Date first assessed: 04/10/21  -AD Time first assessed: 2231 -AD Present on Hospital Admission: Y  -AD Side: Left  -AD Location: coccyx  -AD       Wound 04/10/21 2236 Left anterior third toe    Wound - Properties Group Placement Date: 04/10/21  -AD Placement Time: 2236 -AD Side: Left  -AD Orientation: anterior  -AD Location: third toe  -AD    Retired Wound - Properties Group Date first assessed: 04/10/21  -AD Time first assessed: 2236 -AD Side: Left  -AD Location: third toe  -AD       Plan of Care Review    Plan of Care Reviewed With  patient  -MW        Progress  no change  -MW        Outcome Summary  OT txt completed. Pt very upset and confused upon entering room, physical touch and therapeutic use of self utilized to redirect pt throughout session for encouragement of increased activity. Pt ModAx2 to come to EOB, modAx2 to stand and take steps to chair. Impulsively sits too early from chair requiring assist to safely get to chair. Dep for LB dressing. Confusion limiting progress this date. Pt will need SNF at d/c. Cnt OT POC.  -MW           Positioning and Restraints    Pre-Treatment Position  in bed  -MW        Post Treatment Position  chair  -MW        In Chair  notified nsg;reclined;call light within reach;encouraged to call for assist;with nsg;legs elevated;exit alarm on  -MW           Progress Summary (OT)    Progress Toward Functional Goals (OT)  unable to show any progress toward functional goals  -MW           Therapy Plan Review/Discharge Plan (OT)    Anticipated Discharge Disposition (OT)  skilled nursing facility  -MW          User Key  (r) = Recorded By, (t) = Taken By, (c) = Cosigned By    Initials Name Effective Dates    Raquel Cannon RN 08/02/16 -     MW Gisele Mendiola OTR/ALCON 08/28/18 -          Occupational Therapy Education                 Title: PT OT SLP Therapies (In Progress)     Topic: Occupational Therapy (In Progress)     Point: ADL training (Done)      Description:   Instruct learner(s) on proper safety adaptation and remediation techniques during self care or transfers.   Instruct in proper use of assistive devices.              Learning Progress Summary           Patient Acceptance, E,D, VU,NR by  at 4/11/2021 1305                   Point: Home exercise program (Not Started)     Description:   Instruct learner(s) on appropriate technique for monitoring, assisting and/or progressing therapeutic exercises/activities.              Learner Progress:  Not documented in this visit.          Point: Precautions (Not Started)     Description:   Instruct learner(s) on prescribed precautions during self-care and functional transfers.              Learner Progress:  Not documented in this visit.          Point: Body mechanics (Not Started)     Description:   Instruct learner(s) on proper positioning and spine alignment during self-care, functional mobility activities and/or exercises.              Learner Progress:  Not documented in this visit.                      User Key     Initials Effective Dates Name Provider Type Discipline     07/05/20 -  Gala Boyer, OTR/L Occupational Therapist OT                  OT Recommendation and Plan     Progress Toward Functional Goals (OT): unable to show any progress toward functional goals  Plan of Care Review  Plan of Care Reviewed With: patient  Progress: no change  Outcome Summary: OT txt completed. Pt very upset and confused upon entering room, physical touch and therapeutic use of self utilized to redirect pt throughout session for encouragement of increased activity. Pt ModAx2 to come to EOB, modAx2 to stand and take steps to chair. Impulsively sits too early from chair requiring assist to safely get to chair. Dep for LB dressing. Confusion limiting progress this date. Pt will need SNF at d/c. Cnt OT POC.  Plan of Care Reviewed With: patient  Outcome Summary: OT txt completed. Pt very upset and confused upon entering  room, physical touch and therapeutic use of self utilized to redirect pt throughout session for encouragement of increased activity. Pt ModAx2 to come to EOB, modAx2 to stand and take steps to chair. Impulsively sits too early from chair requiring assist to safely get to chair. Dep for LB dressing. Confusion limiting progress this date. Pt will need SNF at d/c. Cnt OT POC.        Time Calculation:   Time Calculation- OT     Row Name 04/12/21 1336             Time Calculation- OT    OT Start Time  1145  -MW      OT Stop Time  1224  -MW      OT Time Calculation (min)  39 min  -MW      Total Timed Code Minutes- OT  39 minute(s)  -MW      OT Received On  04/12/21  -MW        User Key  (r) = Recorded By, (t) = Taken By, (c) = Cosigned By    Initials Name Provider Type    Gisele Rodríguez OTR/L Occupational Therapist        Therapy Charges for Today     Code Description Service Date Service Provider Modifiers Qty    [id number] HC OT SELF CARE/MGMT/TRAIN EA 15 MIN 4/12/2021 Gisele Mendiola OTR/L GO 3               HELEN Jimenez/ALCON  4/12/2021

## 2021-04-12 NOTE — PROGRESS NOTES
[facility] Medicine Services  INPATIENT PROGRESS NOTE    Length of Stay: 1  Date of Admission: 4/10/2021  Primary Care Physician: Johnson Phan MD    Subjective   Chief Complaint: Follow-up  HPI   Patient resting in bed.  He states he is feeling relatively well today.  He has had some diarrhea, states nurses have had to clean him up twice thus far today.  He states his abdomen is a little tender, but not painful.  He does complain of some nausea without vomiting.  He denies any chest pain or shortness of breath.  He denies any back pain at present.      Review of Systems   All pertinent negatives and positives are as above. All other systems have been reviewed and are negative unless otherwise stated.     Objective    Temp:  [97.4 °F (36.3 °C)-98.2 °F (36.8 °C)] 98.2 °F (36.8 °C)  Heart Rate:  [75-80] 75  Resp:  [18] 18  BP: (149-159)/(73-83) 159/73  Physical Exam  Vitals and nursing note reviewed.   Constitutional:       General: He is not in acute distress.     Appearance: Normal appearance. He is not ill-appearing.   HENT:      Head: Normocephalic and atraumatic.   Cardiovascular:      Rate and Rhythm: Normal rate and regular rhythm.      Pulses: Normal pulses.      Heart sounds: Normal heart sounds.   Pulmonary:      Effort: Pulmonary effort is normal.      Breath sounds: Normal breath sounds. No wheezing, rhonchi or rales.   Abdominal:      General: Bowel sounds are normal. There is no distension.      Palpations: Abdomen is soft.      Tenderness: There is abdominal tenderness.   Musculoskeletal:         General: No swelling or tenderness. Normal range of motion.      Cervical back: Normal range of motion and neck supple. No tenderness.   Skin:     General: Skin is warm and dry.      Findings: No erythema or rash.      Comments: Slight excoriation to bilateral groin   Neurological:      Mental Status: He is alert and oriented to person, place, and time.      Comments:  Right leg weaker than left.  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   Psychiatric:         Mood and Affect: Mood normal.         Behavior: Behavior normal.         Thought Content: Thought content normal.         Judgment: Judgment normal.     Results Review:  I have reviewed the labs, radiology results, and diagnostic studies.    Laboratory Data:   Results from last 7 days   Lab Units 04/11/21  0620 04/10/21  1354   WBC 10*3/mm3 7.48 10.24   HEMOGLOBIN g/dL 12.3* 14.0   HEMATOCRIT % 36.4* 40.8   PLATELETS 10*3/mm3 208 226     Results from last 7 days   Lab Units 04/12/21  0438 04/11/21  0619 04/10/21  1354   SODIUM mmol/L 140 140 140   POTASSIUM mmol/L 3.9 3.6 4.3   CHLORIDE mmol/L 102 99 101   CO2 mmol/L 27.0 29.0 27.0   BUN mg/dL 9 10 12   CREATININE mg/dL 0.87 0.90 0.98   CALCIUM mg/dL 9.0 9.1 9.6   BILIRUBIN mg/dL  --   --  0.5   ALK PHOS U/L  --   --  118*   ALT (SGPT) U/L  --   --  14   AST (SGOT) U/L  --   --  13   GLUCOSE mg/dL 158* 130* 214*     Radiology Data:   Imaging Results (Last 24 Hours)     Procedure Component Value Units Date/Time    CT Thoracic Spine Without Contrast [[id number]] Collected: 04/11/21 1642     Updated: 04/11/21 1651    Narrative:      EXAMINATION:  CT THORACIC SPINE WO CONTRAST-  4/11/2021 3:31 PM CDT     HISTORY: Back trauma (Ped 0-15y); R53.1-Weakness; R29.6-Repeated falls;  Z78.9-Other specified health status; R13.10-Dysphagia, unspecified      COMPARISON: No comparison study.     TECHNIQUE: Radiation dose equals  mGy-cm.  Automated exposure  control dose reduction technique was implemented.     Thin section axial imaging was obtained without intravenous contrast.  2-D sagittal and coronal reconstruction images were generated.     FINDINGS:      There is kyphosis of the mid thoracic spine with mild right lateral  deviation, there is mild roto-scoliosis.     There is diffuse spondylosis changes with anterior and lateral  bridging  osteophyte formation.     The facets are appropriately aligned.     Vertebral body heights are maintained without fracture or subluxation.     There is no CT evidence of disc herniation or significant canal  stenosis.     There is relative mild foraminal narrowing associated with the  rotoscoliotic change T6-T7 through T9-T10.       Impression:      1. No CT evidence of acute bony injury to the thoracic spine.  2. Rotoscoliotic change with diffuse spondylosis.  This report was finalized on 04/11/2021 16:47 by Dr. Josué Patterson MD.    CT Lumbar Spine Without Contrast [[id number]] Collected: 04/11/21 1615     Updated: 04/11/21 1628    Narrative:      EXAMINATION:  CT LUMBAR SPINE WO CONTRAST-  4/11/2021 3:31 PM CDT     HISTORY: Low back pain, trauma; R53.1-Weakness; R29.6-Repeated falls;  Z78.9-Other specified health status; R13.10-Dysphagia, unspecified      COMPARISON: 3/29/2021 lumbar spine CT imaging     TECHNIQUE:      Radiation dose equals  mGy-cm.  Automated exposure control dose  reduction technique was implemented.     Thin section axial imaging was obtained without intravenous contrast.  2-D sagittal and coronal reconstruction images were generated.     FINDINGS:      There is progression of endplate depression deformity of the superior  plate of L2. An estimated 20% loss vertebral body height observed  particularly centrally. There is developing vacuum phenomenon with  Schmorl's-like changes. The fracture line noted on the previous  examination is less conspicuous.     There is no retropulsion associated with posterior element involvement  with fracture.     There is no CT evidence of additional acute or subacute fractures.     There is bilateral spondylolysis of L5 with mild grade 1 spinal  listhesis of L5 on S1 that appears stable.     There are spondylosis changes diffusely in the lumbar spine with mild  anterior osteophyte formation and mild Schmorl's node change.     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     There is broad-based disc bulging at L5-S1 and minimally at L4-L5. There  is no significant canal stenosis.     There is relative foraminal narrowing at L5-S1, L4-L5 and to lesser  L3-L4.     There is relative foraminal stenosis       Impression:      1. Subacute superior endplate fracture of L2 with mild progression of  superior endplate deformity and developing disc degeneration/vacuum  phenomenon with Schmorl's node-like changes.  2. No CT evidence of acute bony injury to the lumbar spine.  3. 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  4. Disc bulging L5-S1 and L4-L5 without significant canal stenosis  5. Foraminal narrowing L5-S1 and L4-L5 and to lesser degree L3-L4.  6.Baastrup's disease L4-L5 and L3-L4.  This report was finalized on 04/11/2021 16:25 by Dr. Josué Patterson MD.        I have reviewed the patient current medications.     Assessment/Plan     Active Hospital Problems    Diagnosis    • **Frequent falls    • Inability to perform activities of daily living    • Dysmetria    • UTI (urinary tract infection) due to urinary indwelling catheter (CMS/HCC)    • Generalized weakness    • Ulcer of right foot, limited to breakdown of skin (CMS/HCC)    • Failure to thrive in adult    • Dehydration with poor PO intake, hemoconcentration, tachycardia and dry mucus membranes    • Urinary retention    • Dysphagia    • History of pulmonary embolism      Jan '21 - submassive PE started on DOAC and treated with EKOS catheter-directed thrombolytics  Seemed \"unprovoked\" - but we asked that his PCP perform malignancy screening as outpatient     • Paroxysmal atrial fibrillation (CMS/HCC)    • HTN (hypertension), benign    • Controlled type 2 diabetes mellitus without complication, without long-term current use of insulin (CMS/HCC)    • Sensorineural hearing loss " (SNHL) of both ears      Plan:  1.  Patient admitted 4/10/2021 secondary to frequent falls and weakness.  2.  CT of the head and cervical spine negative for acute process.  3.  X-ray of the bilateral knees, right elbow, and facial bones on admission showed no acute injury.  4.  The patient was seen in the emergency department on 3/29 following a fall with resultant back pain.  He had an MRI at that time which showed an acute superior endplate fracture at L2, confined to the anterior column with no significant loss of vertebral body height no evidence of retropulsion or cord compression.  Mild multilevel degenerative changes.  Bulging of discs relatively diffusely with no evidence for disc herniation.  No significant canal stenosis noted.  Patient evaluated by neurosurgery.  A CT of the lumbar and thoracic spine was obtained to ensure there was no progression of the L2 compression fracture.  This shows mild progression of deformity and developing disc degeneration/vacuum phenomenon with Schmorl's node-like changes.  CT of the thoracic spine negative for acute changes.  No intervention per neurosurgery at this time, will monitor progress with therapy.  5.  The patient has had difficulty with urinary retention on an outpatient basis, felt to be first noted during his ER evaluation on 3/29.  He has followed up with urology on 4/5.  Voiding trial was attempted in office and unsuccessful.  A Burris catheter was replaced in office.  Patient has been placed on Flomax.  Felt to be possibly secondary to combination of pain medication and constipation.  Continue Burris catheter and Flomax, to follow-up with urology as an outpatient.  6.  Patient had been evaluated in the emergency department on the morning of 4/10 and noted to have a urinary tract infection.  Urinalysis at that time showed positive nitrites, leukocytes, too numerous to count white blood cells, and 4+ bacteria.  He was to be discharged home with Omnicef.  His  urine culture from that visit is pending.  Repeat urinalysis later that evening again demonstrated nitrites, leukocytes, 21-30 white blood cells, and no bacteria.  Urine culture showed no growth, however this was obtained after starting antibiotic therapy.  Blood cultures with no growth thus far.  Continue Rocephin, day 3.  Await urine culture results.  7.  Will need to monitor diarrhea closely.  Start probiotic.  No indication for stool testing at this time-patient remains afebrile.  Consider further imaging if diarrhea and/or nausea persists.  8.  Nystatin powder to bilateral groin.  9.  Home medications resumed as appropriate.  10.  DVT prophylaxis achieved by virtue of home medication Xarelto.  Patient has history of pulmonary embolus diagnosed in January 2021.  11.  Continue PT/OT.  It is not felt patient is safe to return home alone at this time.   to determine if any nursing facilities will be able to take patient to rehabilitation as he is in observation status.  12.  Labs in a.m.    Discharge Planning: I expect the patient to be discharged to SNF vs. Home with home health in 1-2 days.    Electronically signed by BRIANNA Carrizales, 4/12/2021, 11:05 CDT.

## 2021-04-12 NOTE — PLAN OF CARE
Goal Outcome Evaluation:  Plan of Care Reviewed With: (P) patient  Progress: (P) improving  Outcome Summary: (P) No pn pre tx, Stood x 2 (second time 2 mins), 1st attempt mod A x 2, 2nd min A x 2, perfomed standing marches, sat with min A x 2

## 2021-04-12 NOTE — CONSULTS
Psychiatric  INPATIENT WOUND CONSULTATION    Today's Date: 04/12/21    Patient Name: Dayday Andrews  MRN: [id number]  CSN: [id number]  PCP: Johnson Phan MD  Referring Provider: Darin Hernandez MD  Attending Provider: Jhoan Lee DO  Length of Stay: 1    SUBJECTIVE   Chief Complaint: Wound of the coccyx    HPI: Dayday Andrews, a 80 y.o.male, presents with a history of diabetes mellitus, hypertension, and arthritis.  Patient presented to the emergency status post fall with nasal abrasion on the bridge of his nose.  He recently had a fall on 3/29 which resulted with an L2 fracture and he has experienced increasing weakness since this event.  He also has chronic urinary retention with a Burris in place.  It is noted that there was discoloration of urine on admission.  He was found to have a UTI.    Inpatient wound care is consulted due to wound on coccyx.  He is also noted to have a callus on the left third toe.  On assessment it is found that the wound on his sacral region is related to trauma from fall.  All areas are blanching with abrasions present.  Patient notes this area is very tender to the touch.  He states his last fall was directly on this area.      Visit Dx:    ICD-10-CM ICD-9-CM   1. Generalized weakness  R53.1 780.79   2. Falls frequently  R29.6 V15.88   3. Decreased activities of daily living (ADL)  Z78.9 V49.89   4. Dysphagia, unspecified type  R13.10 787.20   5. Gait abnormality  R26.9 781.2   6. Ulcer of right foot, limited to breakdown of skin (CMS/HCC)  L97.511 707.15     Patient Active Problem List   Diagnosis   • Dysfunction of right eustachian tube   • Allergic rhinitis   • Sensorineural hearing loss (SNHL) of both ears   • Hx of adenomatous colonic polyps   • HTN (hypertension), benign   • Controlled type 2 diabetes mellitus without complication, without long-term current use of insulin (CMS/HCC)   • Paroxysmal atrial fibrillation (CMS/HCC)   • Atrial flutter  (CMS/[facility])   • Acute respiratory failure with hypoxia (CMS/[facility])   • History of pulmonary embolism   • Inability to perform activities of daily living   • Dysmetria   • UTI (urinary tract infection) due to urinary indwelling catheter (CMS/[facility])   • Generalized weakness   • Ulcer of right foot, limited to breakdown of skin (CMS/HCC)   • Failure to thrive in adult   • Dehydration with poor PO intake, hemoconcentration, tachycardia and dry mucus membranes   • Urinary retention   • Dysphagia   • Frequent falls       History:   Past Medical History:   Diagnosis Date   • Arthritis    • Diabetes (CMS/[facility])    • High blood pressure    • Hx of colonic polyp    • Sleep apnea      Past Surgical History:   Procedure Laterality Date   • COLONOSCOPY N/A 12/13/2018    Procedure: COLONOSCOPY WITH ANESTHESIA;  Surgeon: Lauri Bourne MD;  Location: [facility] ENDOSCOPY;  Service: Gastroenterology   • COLONOSCOPY W/ POLYPECTOMY  09/22/2015    3 Tubular adenomas Ileocecal valve and at 80 cm repeat exam in 3 years   • EKOS CATHETER PLACEMENT Bilateral 1/15/2021    Procedure: Ekos catheter placement;  Surgeon: Raj Aldridge MD;  Location: [facility] CATH INVASIVE LOCATION;  Service: Cardiovascular;  Laterality: Bilateral;   • KNEE SURGERY       Social History     Socioeconomic History   • Marital status:      Spouse name: Not on file   • Number of children: Not on file   • Years of education: Not on file   • Highest education level: Not on file   Tobacco Use   • Smoking status: Never Smoker   • Smokeless tobacco: Never Used   Vaping Use   • Vaping Use: Never used   Substance and Sexual Activity   • Alcohol use: No   • Drug use: Never   • Sexual activity: Defer       Allergies:  No Known Allergies    Medications:    Current Facility-Administered Medications:   •  acetaminophen (TYLENOL) tablet 650 mg, 650 mg, Oral, Q4H PRN, Darin Hernandez MD, 650 mg at 04/11/21 2035  •  allopurinol (ZYLOPRIM) tablet 300 mg, 300 mg, Oral, Daily,  Woeltz, Ana K, APRN, 300 mg at 04/12/21 1207  •  bisoprolol (ZEBeta) tablet 10 mg, 10 mg, Oral, Daily, Woeltz, Ana K, APRN, 10 mg at 04/12/21 1205  •  buPROPion XL (WELLBUTRIN XL) 24 hr tablet 300 mg, 300 mg, Oral, Daily, Woeltz, Ana K, APRN, 300 mg at 04/12/21 1207  •  cefTRIAXone (ROCEPHIN) 1 g/100 mL 0.9% NS (MBP), 1 g, Intravenous, Q24H, Darin Hernandez MD, Last Rate: 0 mL/hr at 04/11/21 1900, 1 g at 04/12/21 1404  •  dextrose (D50W) 25 g/ 50mL Intravenous Solution 25 g, 25 g, Intravenous, Q15 Min PRN, Darin Hernandez MD  •  dextrose (GLUTOSE) oral gel 15 g, 15 g, Oral, Q15 Min PRN, Darin Hernandez MD  •  glucagon (human recombinant) (GLUCAGEN DIAGNOSTIC) injection 1 mg, 1 mg, Subcutaneous, Q15 Min PRN, Darin Hernandez MD  •  insulin lispro (humaLOG) injection 0-9 Units, 0-9 Units, Subcutaneous, TID AC, Darin Hernandez MD, 2 Units at 04/12/21 1215  •  mupirocin (BACTROBAN) 2 % ointment, , Topical, Q12H, Alexa Nicole, APRN  •  nystatin (MYCOSTATIN) powder, , Topical, Q12H, Woeltz, Ana K, APRN, Given at 04/12/21 1206  •  ondansetron (ZOFRAN) tablet 4 mg, 4 mg, Oral, Q6H PRN **OR** ondansetron (ZOFRAN) injection 4 mg, 4 mg, Intravenous, Q6H PRN, Darin Hernandez MD  •  pentoxifylline (TRENtal) CR tablet 400 mg, 400 mg, Oral, BID, Woeltz, Ana K, APRN, 400 mg at 04/12/21 1205  •  rivaroxaban (XARELTO) tablet 20 mg, 20 mg, Oral, Daily With Dinner, Darin Hernandez MD, 20 mg at 04/11/21 1732  •  saccharomyces boulardii (FLORASTOR) capsule 250 mg, 250 mg, Oral, BID, Ana Cui APRN, 250 mg at 04/12/21 1207  •  [COMPLETED] Insert peripheral IV, , , Once **AND** sodium chloride 0.9 % flush 10 mL, 10 mL, Intravenous, PRN, Bogdan Vigil PA-C  •  sodium chloride 0.9 % flush 10 mL, 10 mL, Intravenous, Q12H, Darin Hernandez MD, 10 mL at 04/11/21 2035  •  sodium chloride 0.9 % flush 10 mL, 10 mL, Intravenous, PRN, Darin Hernandez MD  •  tamsulosin  (FLOMAX) 24 hr capsule 0.4 mg, 0.4 mg, Oral, Daily, Ana Cui APRN, 0.4 mg at 04/12/21 1206    Review of Systems:  Review of Systems   Constitutional: Negative for chills and fever.   HENT: Negative for rhinorrhea and sinus pressure.    Respiratory: Negative for cough and shortness of breath.    Cardiovascular: Negative for chest pain and palpitations.   Gastrointestinal: Negative for diarrhea, nausea and vomiting.   Genitourinary: Positive for difficulty urinating. Negative for flank pain.   Musculoskeletal: Positive for back pain. Negative for myalgias.   Neurological: Positive for weakness. Negative for dizziness and headaches.   Psychiatric/Behavioral: Negative for agitation and behavioral problems.         OBJECTIVE     Vitals:    04/12/21 1354   BP: 141/78   Pulse:    Resp: 17   Temp: 98.5 °F (36.9 °C)   SpO2: 96%       PHYSICAL EXAM  Physical Exam  Vitals and nursing note reviewed.   Constitutional:       Appearance: He is overweight.      Comments: BMI: 29.79   HENT:      Head: Normocephalic and atraumatic.   Eyes:      General: Lids are normal. Gaze aligned appropriately.   Cardiovascular:      Rate and Rhythm: Normal rate and regular rhythm.   Pulmonary:      Effort: Pulmonary effort is normal. No respiratory distress.   Abdominal:      General: There is no distension.      Palpations: Abdomen is soft.   Musculoskeletal:      Cervical back: Normal range of motion and neck supple.      Right foot: Deformity present.      Left foot: Deformity present.   Feet:      Right foot:      Skin integrity: Dry skin present.      Toenail Condition: Right toenails are long.      Left foot:      Skin integrity: Ulcer, callus and dry skin present.      Toenail Condition: Left toenails are long.      Comments: Hammertoes present on bilateral feet.  Ulcer of left 3rd toe is a neuropathic ulcer with the combined etiology of pressure and diabetes.  Callus measures 0.7cm x 0.8cm.  This was partially attached.  After  callus was removed using scissors, the remaining ulcer measured 0.3cm x 0.3cm x 0.1cm.  The wound base is pink and moist.  There was no bleeding and no pain reported.    Skin:     General: Skin is warm and dry.      Findings: Wound present.          Neurological:      Mental Status: He is oriented to person, place, and time. He is lethargic and confused.   Psychiatric:         Attention and Perception: Attention normal.         Mood and Affect: Mood normal.         Speech: Speech is delayed.       Pictures taken by nursing staff:                Results Review:  Lab Results (last 48 hours)     Procedure Component Value Units Date/Time    Blood Culture - Blood, Arm, Left [[id number]] Collected: 04/10/21 1355    Specimen: Blood from Arm, Left Updated: 04/12/21 1415     Blood Culture No growth at 2 days    Blood Culture - Blood, Arm, Right [[id number]] Collected: 04/10/21 1355    Specimen: Blood from Arm, Right Updated: 04/12/21 1415     Blood Culture No growth at 2 days    POC Glucose Once [[id number]]  (Abnormal) Collected: 04/12/21 1203    Specimen: Blood Updated: 04/12/21 1215     Glucose 159 mg/dL      Comment: : [id number] Leggett (Hard) KatyMeter ID: [id number]       POC Glucose Once [[id number]]  (Normal) Collected: 04/12/21 0751    Specimen: Blood Updated: 04/12/21 0803     Glucose 114 mg/dL      Comment: : [id number] Farzadreshma TammyMeter ID: [id number]       Extra Tubes [[id number]] Collected: 04/12/21 0438    Specimen: Blood, Venous Line Updated: 04/12/21 0600    Narrative:      The following orders were created for panel order Extra Tubes.  Procedure                               Abnormality         Status                     ---------                               -----------         ------                     Lavender Top[[id number]]                                     Final result                 Please view results for these tests on the individual orders.    Lavender Top [[id number]] Collected: 04/12/21  0438    Specimen: Blood Updated: 04/12/21 0600     Extra Tube hold for add-on     Comment: Auto resulted       Basic Metabolic Panel [[id number]]  (Abnormal) Collected: 04/12/21 0438    Specimen: Blood Updated: 04/12/21 0533     Glucose 158 mg/dL      BUN 9 mg/dL      Creatinine 0.87 mg/dL      Sodium 140 mmol/L      Potassium 3.9 mmol/L      Chloride 102 mmol/L      CO2 27.0 mmol/L      Calcium 9.0 mg/dL      eGFR Non African Amer 85 mL/min/1.73      BUN/Creatinine Ratio 10.3     Anion Gap 11.0 mmol/L     Narrative:      GFR Normal >60  Chronic Kidney Disease <60  Kidney Failure <15      Magnesium [[id number]]  (Normal) Collected: 04/12/21 0438    Specimen: Blood Updated: 04/12/21 0533     Magnesium 2.1 mg/dL     POC Glucose Once [[id number]]  (Abnormal) Collected: 04/11/21 2030    Specimen: Blood Updated: 04/11/21 2050     Glucose 158 mg/dL      Comment: : [id number] Lila Gerard (Drury) AnnMeter ID: [id number]       POC Glucose Once [[id number]]  (Abnormal) Collected: 04/11/21 1635    Specimen: Blood Updated: 04/11/21 1650     Glucose 159 mg/dL      Comment: : [id number] Edward PedroandaMeter ID: [id number]       POC Glucose Once [[id number]]  (Abnormal) Collected: 04/11/21 1138    Specimen: Blood Updated: 04/11/21 1150     Glucose 169 mg/dL      Comment: : [id number] Edward AmandaMeter ID: [id number]       Urine Culture - Urine, Urine, Clean Catch [[id number]]  (Normal) Collected: 04/10/21 1443    Specimen: Urine, Clean Catch Updated: 04/11/21 1135     Urine Culture No growth    POC Glucose Once [[id number]]  (Normal) Collected: 04/11/21 0825    Specimen: Blood Updated: 04/11/21 0838     Glucose 116 mg/dL      Comment: : [id number] Edward AmandaMeter ID: [id number]       TSH [[id number]]  (Normal) Collected: 04/11/21 0619    Specimen: Blood Updated: 04/11/21 0711     TSH 0.865 uIU/mL     Basic Metabolic Panel [[id number]]  (Abnormal) Collected: 04/11/21 0619    Specimen: Blood Updated: 04/11/21 0711      Glucose 130 mg/dL      BUN 10 mg/dL      Creatinine 0.90 mg/dL      Sodium 140 mmol/L      Potassium 3.6 mmol/L      Chloride 99 mmol/L      CO2 29.0 mmol/L      Calcium 9.1 mg/dL      eGFR Non African Amer 82 mL/min/1.73      BUN/Creatinine Ratio 11.1     Anion Gap 12.0 mmol/L     Narrative:      GFR Normal >60  Chronic Kidney Disease <60  Kidney Failure <15      Phosphorus [[id number]]  (Normal) Collected: 04/11/21 0619    Specimen: Blood Updated: 04/11/21 0708     Phosphorus 3.8 mg/dL     Hemoglobin A1c [[id number]]  (Abnormal) Collected: 04/11/21 0620    Specimen: Blood Updated: 04/11/21 0708     Hemoglobin A1C 8.30 %     Narrative:      Hemoglobin A1C Ranges:    Increased Risk for Diabetes  5.7% to 6.4%  Diabetes                     >= 6.5%  Diabetic Goal                < 7.0%    Magnesium [[id number]]  (Normal) Collected: 04/11/21 0619    Specimen: Blood Updated: 04/11/21 0706     Magnesium 2.1 mg/dL     CBC Auto Differential [[id number]]  (Abnormal) Collected: 04/11/21 0620    Specimen: Blood Updated: 04/11/21 0646     WBC 7.48 10*3/mm3      RBC 4.20 10*6/mm3      Hemoglobin 12.3 g/dL      Hematocrit 36.4 %      MCV 86.7 fL      MCH 29.3 pg      MCHC 33.8 g/dL      RDW 14.0 %      RDW-SD 44.2 fl      MPV 9.0 fL      Platelets 208 10*3/mm3      Neutrophil % 71.3 %      Lymphocyte % 20.1 %      Monocyte % 6.7 %      Eosinophil % 0.8 %      Basophil % 0.7 %      Immature Grans % 0.4 %      Neutrophils, Absolute 5.34 10*3/mm3      Lymphocytes, Absolute 1.50 10*3/mm3      Monocytes, Absolute 0.50 10*3/mm3      Eosinophils, Absolute 0.06 10*3/mm3      Basophils, Absolute 0.05 10*3/mm3      Immature Grans, Absolute 0.03 10*3/mm3      nRBC 0.0 /100 WBC     POC Glucose Once [[id number]]  (Abnormal) Collected: 04/10/21 2103    Specimen: Blood Updated: 04/10/21 2133     Glucose 155 mg/dL      Comment: : [id number] Lila Gerard (Drury) AnnMeter ID: [id number]           Imaging Results (Last 72 Hours)     Procedure  Component Value Units Date/Time    CT Thoracic Spine Without Contrast [[id number]] Collected: 04/11/21 1642     Updated: 04/11/21 1651    Narrative:      EXAMINATION:  CT THORACIC SPINE WO CONTRAST-  4/11/2021 3:31 PM CDT     HISTORY: Back trauma (Ped 0-15y); R53.1-Weakness; R29.6-Repeated falls;  Z78.9-Other specified health status; R13.10-Dysphagia, unspecified      COMPARISON: No comparison study.     TECHNIQUE: Radiation dose equals  mGy-cm.  Automated exposure  control dose reduction technique was implemented.     Thin section axial imaging was obtained without intravenous contrast.  2-D sagittal and coronal reconstruction images were generated.     FINDINGS:      There is kyphosis of the mid thoracic spine with mild right lateral  deviation, there is mild roto-scoliosis.     There is diffuse spondylosis changes with anterior and lateral bridging  osteophyte formation.     The facets are appropriately aligned.     Vertebral body heights are maintained without fracture or subluxation.     There is no CT evidence of disc herniation or significant canal  stenosis.     There is relative mild foraminal narrowing associated with the  rotoscoliotic change T6-T7 through T9-T10.       Impression:      1. No CT evidence of acute bony injury to the thoracic spine.  2. Rotoscoliotic change with diffuse spondylosis.  This report was finalized on 04/11/2021 16:47 by Dr. Josué Patterson MD.    CT Lumbar Spine Without Contrast [[id number]] Collected: 04/11/21 1615     Updated: 04/11/21 1628    Narrative:      EXAMINATION:  CT LUMBAR SPINE WO CONTRAST-  4/11/2021 3:31 PM CDT     HISTORY: Low back pain, trauma; R53.1-Weakness; R29.6-Repeated falls;  Z78.9-Other specified health status; R13.10-Dysphagia, unspecified      COMPARISON: 3/29/2021 lumbar spine CT imaging     TECHNIQUE:      Radiation dose equals  mGy-cm.  Automated exposure control dose  reduction technique was implemented.     Thin section axial imaging was  obtained without intravenous contrast.  2-D sagittal and coronal reconstruction images were generated.     FINDINGS:      There is progression of endplate depression deformity of the superior  plate of L2. An estimated 20% loss vertebral body height observed  particularly centrally. There is developing vacuum phenomenon with  Schmorl's-like changes. The fracture line noted on the previous  examination is less conspicuous.     There is no retropulsion associated with posterior element involvement  with fracture.     There is no CT evidence of additional acute or subacute fractures.     There is bilateral spondylolysis of L5 with mild grade 1 spinal  listhesis of L5 on S1 that appears stable.     There are spondylosis changes diffusely in the lumbar spine with mild  anterior osteophyte formation and mild Schmorl's node change.     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     There is broad-based disc bulging at L5-S1 and minimally at L4-L5. There  is no significant canal stenosis.     There is relative foraminal narrowing at L5-S1, L4-L5 and to lesser  L3-L4.     There is relative foraminal stenosis       Impression:      1. Subacute superior endplate fracture of L2 with mild progression of  superior endplate deformity and developing disc degeneration/vacuum  phenomenon with Schmorl's node-like changes.  2. No CT evidence of acute bony injury to the lumbar spine.  3. 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  4. Disc bulging L5-S1 and L4-L5 without significant canal stenosis  5. Foraminal narrowing L5-S1 and L4-L5 and to lesser degree L3-L4.  6.Baastrup's disease L4-L5 and L3-L4.  This report was finalized on 04/11/2021 16:25 by Dr. Josué Patterson MD.    MRI Brain Without Contrast [[id number]] Collected: 04/11/21 1005     Updated: 04/11/21 1012    Narrative:      EXAMINATION:  MRI BRAIN WO  CONTRAST-  4/11/2021 9:02 AM CDT     HISTORY: Neuro deficit, acute, stroke suspected      COMPARISON: Head CT dated 4/10/2021     TECHNIQUE: MR imaging the brain was performed.     FINDINGS:      Significant image quality degradation related to patient motion.  Multiple sequences were repeated.     There is no diffusion signal abnormality to indicate an acute ischemic  event/area of infarction.     There is central and cortical atrophy.     There are punctate and confluent periventricular and subcortical FLAIR  signal white matter hyperintensities that are nonspecific, chronic  ischemic changes most likely.     There is no definite abnormal intra-axial extra-axial blood products  identified.  No definite flow void appreciated, suboptimally imaged on the gradient  echo imaging sequences.     There is no mass effect or midline shift.     There is no hydrocephalus.     The globes and intraorbital structures are imaged symmetrically.     Pituitary gland is not enlarged.     Cervical spine imaged in part is unremarkable.       Impression:      1. No MR evidence of an acute intracranial process.  2. Atrophy.  3. Probable chronic ischemic white matter changes.  This report was finalized on 04/11/2021 10:09 by Dr. Josué Patterson MD.    XR Chest 1 View [[id number]] Collected: 04/10/21 1641     Updated: 04/10/21 1645    Narrative:      EXAMINATION: XR CHEST 1 VW-. 4/10/2021 4:41 PM CDT     CHEST, ONE VIEW:     HISTORY: Pain post fall     COMPARISON: 3/29/2021 and 1/15/2021 chest radiography     A single frontal chest radiograph was obtained.     FINDINGS:     The level of inspiration is relatively shallow and lung volumes  diminished.     The lungs are clear without infiltrates. There are no pleural effusions  or pneumothoraces.     The heart is normal in size, pulmonary circulation appropriate, without  heart failure.     The bony structures are intact without acute osseous abnormality.                                      Impression:      1. No acute cardiopulmonary process.     This report was finalized on 04/10/2021 16:42 by Dr. Josué Patterson MD.    XR Knee 1 or 2 View Bilateral [[id number]] Collected: 04/10/21 1640     Updated: 04/10/21 1644    Narrative:      EXAMINATION:  XR KNEE 1 OR 2 VW BILATERAL-  4/10/2021 2:45 PM CDT     HISTORY: fall pain     COMPARISON: No comparison study.     TECHNIQUE: 4 views: AP lateral projection imaging of both knees were  obtained.     FINDINGS:      Patella femoral and tibial relationships are appropriate without  fracture.     Osteoarthritic changes identified in both knees particularly the  patellofemoral and medial joint space compartments, greater on the  right.     Small joint effusion suspected in the suprapatellar regions bilaterally.     Arterial calcifications noted posteriorly       Impression:      1. No acute osseous abnormality.  2. Small joint effusions.  3. Degenerative changes.  This report was finalized on 04/10/2021 16:41 by Dr. Josué Patterson MD.    XR Elbow 3+ View Right [[id number]] Collected: 04/10/21 1639     Updated: 04/10/21 1643    Narrative:      EXAMINATION:  XR ELBOW 3+ VW RIGHT-  4/10/2021 2:47 PM CDT     HISTORY: fall pain     COMPARISON: No comparison study.     TECHNIQUE: 3 views: AP lateral oblique projection imaging     FINDINGS:      The bony structures are intact. The joint spaces are maintained.     There is no fracture or dislocation.     Degenerative changes identified with osteophyte formation along the  ulnar humeral joint coronoid spurring. Spurring also noted along the  radial head neck junction.     No significant joint effusion identified.       Impression:      1. No acute osseous abnormality.  2. Degenerative changes.  This report was finalized on 04/10/2021 16:40 by Dr. Josué Patterson MD.    CT Cervical Spine Without Contrast [[id number]] Collected: 04/10/21 1444     Updated: 04/10/21 1455    Narrative:      EXAMINATION:  CT CERVICAL SPINE WO CONTRAST-   4/10/2021 2:15 PM CDT     HISTORY: fall onto face. Neck pain     COMPARISON: No comparison study.     TECHNIQUE: Radiation dose equals  mGy-cm.  Automated exposure  control dose reduction technique was implemented.     Thin section axial imaging was obtained without intravenous contrast.  2-D sagittal and coronal reconstruction images were generated.     FINDINGS:      Facets are appropriately aligned with hypertrophic changes.     There is spondylosis changes with ridging anterior osteophyte and  uncinate hypertrophy with mild disc space narrowing C5-C6 and to a  lesser degree C4-C5 and C6-C7.     Vertebral body heights are maintained. The prevertebral soft tissues are  normal.     There is no fracture or subluxation.     There is mild posterior osteophyte and disc complexes at C5-C6, C6-C7  and C4-C5 without significant canal stenosis. There is multilevel  foraminal narrowing particularly at C3-C4, C4-C5 and C5-C6.     There is no CT evidence of significant posttraumatic disc herniation.      Carotid artery calcifications identified.       Impression:      1. No CT evidence of acute bony injury to the cervical spine.  2. Multilevel disc degeneration and spondylosis.  This report was finalized on 04/10/2021 14:52 by Dr. Josué Patterson MD.    CT Head Without Contrast [[id number]] Collected: 04/10/21 1441     Updated: 04/10/21 1446    Narrative:      EXAMINATION: CT HEAD WO CONTRAST-  4/10/2021 2:41 PM CDT     CT SCAN OF THE HEAD, WITHOUT CONTRAST:      HISTORY: Head pain post fall/head trauma     COMPARISONS: 3/29/2021 head CT      TECHNIQUE:     Radiation dose equals  mGy-cm.  Automated exposure control dose  reduction technique was implemented.        CT evaluation of the head without intravenous contrast. 5 mm transaxial  images were obtained.   2-D sagittal and coronal reconstruction images  were generated.     FINDINGS:      There is no intra or extra-axial hemorrhage.     There is no mass, mass  effect or midline shift.     There is no developing hydrocephalus.     There is central and cortical atrophy.     Low-attenuation in the periventricular subcortical white matter observed  compatible with mild chronic ischemic changes.     There is no skull fracture or acute calvarial abnormality.     Paranasal sinuses imaged incompletely are clear.     CT appearance of the head is unchanged.             Impression:      1. No CT evidence of an acute intracranial process.                              This report was finalized on 04/10/2021 14:43 by Dr. Josué Patterson MD.             ASSESSMENT/PLAN       Examination and evaluation of wound(s) was performed.    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  Apply Bactroban and cover with vaseline gauze and gauze.  Change every 12 hours.    Cleanse Normal Saline    Intervention Bactroban    Intervention Vaseline Gauze    Dressing: Gauze Roll        04/12/21 1511    04/12/21 0000  Ambulatory Referral to Wound Clinic      04/12/21 1512    04/11/21 0800  Wound Care Routine (Specify)  2 Times Daily,   Status:  Canceled     Comments: Please paint right toe wounds with betadine    04/10/21 1854                  Discussed findings and treatment plan including risks, benefits, and treatment options with patient in detail. Patient agreed with treatment plan.      This document has been electronically signed by BRIANNA Byrd on 4/12/2021 15:12 CDT

## 2021-04-12 NOTE — PROGRESS NOTES
Continued Stay Note  [facility]     Patient Name: Dayday Andrews  MRN: [id number]  Today's Date: 4/12/2021    Admit Date: 4/10/2021    Discharge Plan     Row Name 04/12/21 1519       Plan    Plan  [facility]    Patient/Family in Agreement with Plan  yes    Plan Comments  [facility] has offered. They have spoken with family and family has accepted. Confirmed with pt's niece, Raeann. Pt will meet his 3 day inpatient stay on Thursday.    Row Name 04/12/21 2903       Plan    Plan  SNF Referrals    Provided Post Acute Provider List?  Yes    Post Acute Provider List  Nursing Home    Provided Post Acute Provider Quality & Resource List?  Yes    Post Acute Provider Quality and Resource List  Nursing Home    Delivered To  Support Person    Method of Delivery  In person    Patient/Family in Agreement with Plan  yes    Plan Comments  [place] worked with therapy. Discussed options and they request referrals to be made to [facility] and [facility]. Referrals made.        Discharge Codes    No documentation.             SHY Luevano

## 2021-04-12 NOTE — PROGRESS NOTES
Continued Stay Note  Psychiatric     Patient Name: Dayday Andrews  MRN: [id number]  Today's Date: 4/12/2021    Admit Date: 4/10/2021    Discharge Plan     Row Name 04/12/21 1409       Plan    Plan  SNF Referrals    Provided Post Acute Provider List?  Yes    Post Acute Provider List  Nursing Home    Provided Post Acute Provider Quality & Resource List?  Yes    Post Acute Provider Quality and Resource List  Nursing Home    Delivered To  Support Person    Method of Delivery  In person    Patient/Family in Agreement with Plan  yes    Plan Comments  [place] worked with therapy. Discussed options and they request referrals to be made to [facility] and [facility]. Referrals made.        Discharge Codes    No documentation.             SHY Luevano

## 2021-04-12 NOTE — THERAPY TREATMENT NOTE
Acute Care - Physical Therapy Treatment Note  [facility]     Patient Name: Dayday Andrews  : 1940  MRN: [id number]  Today's Date: 2021           PT Assessment (last 12 hours)      PT Evaluation and Treatment     Row Name 21 133          Physical Therapy Time and Intention    Subjective Information  no complaints  (Pended)   -GP     Document Type  therapy note (daily note)  (Pended)   -GP     Mode of Treatment  physical therapy  (Pended)   -GP     Row Name 21 133          General Information    Patient Profile Reviewed  yes  (Pended)   -GP     Existing Precautions/Restrictions  fall;spinal  (Pended)   -GP     Row Name 21 133          Transfers    Sit-Stand [place] (Transfers)  minimum assist (75% patient effort);moderate assist (50% patient effort);2 person assist;verbal cues  (Pended)  Sit to stand x 2, min assist x 2 second time, stood 2min CGA  -GP     Stand-Sit [place] (Transfers)  verbal cues;minimum assist (75% patient effort);2 person assist  (Pended)   -GP     Row Name 21          Sit-Stand Transfer    Assistive Device (Sit-Stand Transfers)  walker, front-wheeled  (Pended)   -GP     Row Name 21 133          Gait/Stairs (Locomotion)    [place] Level (Gait)  minimum assist (75% patient effort);2 person assist;verbal cues  (Pended)   -GP     Assistive Device (Gait)  walker, front-wheeled  (Pended)   -GP     Distance in Feet (Gait)  5' x 2  (Pended)  Stepped away from and back to chair  -GP     Row Name             Wound 04/10/21 2231 Left coccyx    Wound - Properties Group Placement Date: 04/10/21  -AD Placement Time: 2231 Present on Hospital Admission: Y  -AD Side: Left  -AD Location: coccyx  -AD    Retired Wound - Properties Group Date first assessed: 04/10/21  -AD Time first assessed: 2231 Present on Hospital Admission: Y  -AD Side: Left  -AD Location: coccyx  -AD    Row Name             Wound 04/10/21 2236 Left anterior third toe     Wound - Properties Group Placement Date: 04/10/21  -AD Placement Time: 2236 -AD Side: Left  -AD Orientation: anterior  -AD Location: third toe  -AD    Retired Wound - Properties Group Date first assessed: 04/10/21  -AD Time first assessed: 2236 -AD Side: Left  -AD Location: third toe  -AD    Row Name 04/12/21 1330          Plan of Care Review    Plan of Care Reviewed With  patient  (Pended)   -GP     Progress  improving  (Pended)   -GP     Outcome Summary  No pn pre tx, Stood x 2 (second time 2 mins), 1st attempt mod A x 2, 2nd min A x 2, perfomed standing marches, sat with min A x 2  (Pended)   -GP     Row Name 04/12/21 1330          Vital Signs    O2 Delivery Pre Treatment  room air  (Pended)   -GP     O2 Delivery Intra Treatment  room air  (Pended)   -GP     O2 Delivery Post Treatment  room air  (Pended)   -GP       User Key  (r) = Recorded By, (t) = Taken By, (c) = Cosigned By    Initials Name Provider Type    Raquel Cannon RN Registered Nurse    GP Juanjose Ambrocio, PTA Student PTA Student        Physical Therapy Education                 Title: PT OT SLP Therapies (In Progress)     Topic: Physical Therapy (In Progress)     Point: Mobility training (Done)     Learning Progress Summary           Patient Acceptance, E, VU,DU by GP at 4/12/2021 1406    Comment: Educated Pt on posture while standing    Acceptance, E,TB,D, NR by JE at 4/12/2021 1357    Comment: Education re: purpose of PT/importance of activity; education re: spinal precautions and improved tech w/ bed mob, tfers and use of rwx w/ standing activity; education for safety/falls prevention w/ functional mobility                   Point: Home exercise program (Not Started)     Learner Progress:  Not documented in this visit.          Point: Body mechanics (In Progress)     Learning Progress Summary           Patient Acceptance, E,TB,D, NR by JE at 4/12/2021 1357    Comment: Education re: purpose of PT/importance of activity; education re: spinal  precautions and improved tech w/ bed mob, tfers and use of rwx w/ standing activity; education for safety/falls prevention w/ functional mobility                   Point: Precautions (In Progress)     Learning Progress Summary           Patient Acceptance, E,TB,D, NR by  at 4/12/2021 1357    Comment: Education re: purpose of PT/importance of activity; education re: spinal precautions and improved tech w/ bed mob, tfers and use of rwx w/ standing activity; education for safety/falls prevention w/ functional mobility                               User Key     Initials Effective Dates Name Provider Type Discipline     08/02/18 -  Jo-Ann Barraza, PT Physical Therapist PT    GP 03/23/21 -  Juanjose Ambrocio, PTA Student PTA Student PT              PT Recommendation and Plan     Plan of Care Reviewed With: (P) patient  Progress: (P) improving  Outcome Summary: (P) No pn pre tx, Stood x 2 (second time 2 mins), 1st attempt mod A x 2, 2nd min A x 2, perfomed standing marches, sat with min A x 2       Time Calculation:   PT Charges     Row Name 04/12/21 1405 04/12/21 1345          Time Calculation    Start Time  1330  (Pended)   -GP  1147 10 min chart review  -     Stop Time  1346  (Pended)   -GP  1226  -     Time Calculation (min)  16 min  (Pended)   -GP  39 min  -     PT Received On  04/12/21  (Pended)   -GP  04/12/21  -     PT Goal Re-Cert Due Date  04/22/21  (Pended)   -GP  04/22/21  -        Time Calculation- PT    Total Timed Code Minutes- PT  16 minute(s)  (Pended)   -GP  --        Timed Charges    87632 - PT Therapeutic Activity Minutes  16  (Pended)   -GP  --       User Key  (r) = Recorded By, (t) = Taken By, (c) = Cosigned By    Initials Name Provider Type     Jo-Ann Barraza, PT Physical Therapist    GP Juanjose Ambrocio, PTA Student PTA Student            PT G-Codes  Outcome Measure Options: AM-PAC 6 Clicks Basic Mobility (PT)  AM-PAC 6 Clicks Score (PT): 11  AM-PAC 6 Clicks Score (OT): 14    Juanjose Ambrocio  PTA Student  4/12/2021

## 2021-04-12 NOTE — THERAPY EVALUATION
Patient Name: Dayday Andrews  : 1940    MRN: [id number]                              Today's Date: 2021       Admit Date: 4/10/2021    Visit Dx:     ICD-10-CM ICD-9-CM   1. Generalized weakness  R53.1 780.79   2. Falls frequently  R29.6 V15.88   3. Decreased activities of daily living (ADL)  Z78.9 V49.89   4. Dysphagia, unspecified type  R13.10 787.20   5. Gait abnormality  R26.9 781.2     Patient Active Problem List   Diagnosis   • Dysfunction of right eustachian tube   • Allergic rhinitis   • Sensorineural hearing loss (SNHL) of both ears   • Hx of adenomatous colonic polyps   • HTN (hypertension), benign   • Controlled type 2 diabetes mellitus without complication, without long-term current use of insulin (CMS/HCC)   • Paroxysmal atrial fibrillation (CMS/HCC)   • Atrial flutter (CMS/HCC)   • Acute respiratory failure with hypoxia (CMS/[facility])   • History of pulmonary embolism   • Inability to perform activities of daily living   • Dysmetria   • UTI (urinary tract infection) due to urinary indwelling catheter (CMS/HCC)   • Generalized weakness   • Ulcer of right foot, limited to breakdown of skin (CMS/HCC)   • Failure to thrive in adult   • Dehydration with poor PO intake, hemoconcentration, tachycardia and dry mucus membranes   • Urinary retention   • Dysphagia   • Frequent falls     Past Medical History:   Diagnosis Date   • Arthritis    • Diabetes (CMS/HCC)    • High blood pressure    • Hx of colonic polyp    • Sleep apnea      Past Surgical History:   Procedure Laterality Date   • COLONOSCOPY N/A 2018    Procedure: COLONOSCOPY WITH ANESTHESIA;  Surgeon: Lauri Bourne MD;  Location: [facility] ENDOSCOPY;  Service: Gastroenterology   • COLONOSCOPY W/ POLYPECTOMY  2015    3 Tubular adenomas Ileocecal valve and at 80 cm repeat exam in 3 years   • EKOS CATHETER PLACEMENT Bilateral 1/15/2021    Procedure: Ekos catheter placement;  Surgeon: Raj Aldridge MD;  Location: [facility] CATH INVASIVE  LOCATION;  Service: Cardiovascular;  Laterality: Bilateral;   • KNEE SURGERY       General Information     Row Name 04/12/21 1147          Physical Therapy Time and Intention    Document Type  evaluation;other (see comments) see MAR; recent hx of frequent falls, 3/29 dx w/ L2 compression fx after a fall; pt w/ recent urinary retention which remains a problem; 1/21 pt experienced a submassive PE  -     Mode of Treatment  physical therapy  -     Row Name 04/12/21 1147          General Information    Patient Profile Reviewed  yes  -JE     Prior Level of Function  (S) independent:;ADL's;all household mobility;community mobility;driving has required use of rwx over the last 2 wk and has had an increase fear of falling and further decline w/ ongoing weakness and frequent falls; prior to this pt was I and driving; received info from chart  -     Existing Precautions/Restrictions  (S) fall;spinal;other (see comments) L2 compression fx dx 3/29/21  -     Barriers to Rehab  cognitive status  -     Row Name 04/12/21 1147          Living Environment    Lives With  spouse per [facility]     Row Name 04/12/21 1147          Home Main Entrance    Number of Stairs, Main Entrance  four  -JE     Stair Railings, Main Entrance  railings on both sides of stairs per [facility]     Row Name 04/12/21 1147          Cognition    Orientation Status (Cognition)  other (see comments) pt able to state his name and give his birthday; he did know it was 2021, however said he was at home and was not able to state why he was in the hospital; pt crying out for his wife Leydi and asking God to help him.  -     Row Name 04/12/21 1147          Safety Issues, Functional Mobility    Safety Issues Affecting Function (Mobility)  at risk behavior observed;friction/shear risk;insight into deficits/self-awareness;safety precaution awareness;safety precautions follow-through/compliance;impulsivity  -     Impairments Affecting Function (Mobility)   balance;cognition;endurance/activity tolerance;pain;postural/trunk control;strength  -     Cognitive Impairments, Mobility Safety/Performance  impulsivity;insight into deficits/self-awareness;safety precaution awareness;safety precaution follow-through  -     Comment, Safety Issues/Impairments (Mobility)  pt resistant to activity; requires much encouragement and constant cues for activity; requires education for safety and impulse control  -     Row Name 04/12/21 1147          Relationship/Environment    Name(s) of Who Lives With Patient  his wife, Leydi  -       User Key  (r) = Recorded By, (t) = Taken By, (c) = Cosigned By    Initials Name Provider Type    Jo-Ann Wakefield, PT Physical Therapist        Mobility     Row Name 04/12/21 1147          Bed Mobility    Bed Mobility  rolling left;sidelying-sit  -     Rolling Left [place] (Bed Mobility)  moderate assist (50% patient effort);2 person assist;verbal cues  -GEOVANNY     Sidelying-Sit [place] (Bed Mobility)  moderate assist (50% patient effort);2 person assist;verbal cues  -     Assistive Device (Bed Mobility)  head of bed elevated;bed rails  -     Comment (Bed Mobility)  required much encouragement and verbal/tactile cues for technique  -     Row Name 04/12/21 1147          Transfers    Comment (Transfers)  pt was impulsive and once started moving, demonstrated lack of control and decrease safety awareness sitting w/out warning  -     Row Name 04/12/21 1147          Bed-Chair Transfer    Bed-Chair [place] (Transfers)  minimum assist (75% patient effort);moderate assist (50% patient effort);2 person assist;verbal cues  -     Assistive Device (Bed-Chair Transfers)  walker, front-wheeled  -     Row Name 04/12/21 1147          Sit-Stand Transfer    Sit-Stand [place] (Transfers)  moderate assist (50% patient effort);2 person assist;verbal cues  -     Assistive Device (Sit-Stand Transfers)  walker, front-wheeled  -     Row  Name 04/12/21 1147          Gait/Stairs (Locomotion)    [place] Level (Gait)  minimum assist (75% patient effort);moderate assist (50% patient effort);2 person assist;verbal cues  -     Assistive Device (Gait)  walker, front-wheeled  -     Distance in Feet (Gait)  steps bed to chair  -     Deviations/Abnormal Patterns (Gait)  base of support, wide;gait speed decreased;stride length decreased  -     Bilateral Gait Deviations  forward flexed posture;heel strike decreased  -     Comment (Gait/Stairs)  lack of foot clearance; required assist to manage rwx  -       User Key  (r) = Recorded By, (t) = Taken By, (c) = Cosigned By    Initials Name Provider Type    Jo-Ann Wakefield, PT Physical Therapist        Obj/Interventions     Row Name 04/12/21 1147          Range of Motion Comprehensive    Comment, General Range of Motion  B LEs AAROM and AROM demonstrated by functional mobility WFLS;defer to OT for formal assessment of UEs  -     Row Name 04/12/21 1147          Strength Comprehensive (MMT)    Comment, General Manual Muscle Testing (MMT) Assessment  no formal assessment of LEs, defer to OT for formal assessment of UEs; pt required much encouragement for activity performed and observed, not cooperative due to confusion, fear and anxiety to complete formal strength assessment of LEs  -     Row Name 04/12/21 1147          Balance    Balance Assessment  sitting static balance;standing static balance;standing dynamic balance  -     Static Sitting Balance  WFL;other (see comments) initially pt overwhelmed and lean to the R in sitting upon coming to sit on edge of bed, however w/ time and relaxation, pt able to maintain w/ supervision  -     Static Standing Balance  moderate impairment;other (see comments);supported;standing requires use of rwx, increase assist from staff, and noted increase JERONIMO/flexed posture  -     Dynamic Standing Balance  moderate impairment;other (see  comments);supported;standing requires rwx, lacks control, poor mechanics, requires increase assist from staff  -     Row Name 04/12/21 1147          Sensory Assessment (Somatosensory)    Sensory Assessment (Somatosensory)  other (see comments) no reported c/os, however difficult to assess due to pt's cognition, anxiety, lack of cooperation, and fear demonstrated during visit  -       User Key  (r) = Recorded By, (t) = Taken By, (c) = Cosigned By    Initials Name Provider Type    Jo-Ann Wakefield, PT Physical Therapist        Goals/Plan     Row Name 04/12/21 1143          Bed Mobility Goal 1 (PT)    Activity/Assistive Device (Bed Mobility Goal 1, PT)  rolling to left;rolling to right;scooting;sidelying to sit/sit to sidelying  -JE     [place] Level/Cues Needed (Bed Mobility Goal 1, PT)  contact guard assist  -JE     Time Frame (Bed Mobility Goal 1, PT)  long term goal (LTG);10 days  -JE     Progress/Outcomes (Bed Mobility Goal 1, PT)  goal ongoing  -     Row Name 04/12/21 5967          Transfer Goal 1 (PT)    Activity/Assistive Device (Transfer Goal 1, PT)  sit-to-stand/stand-to-sit;bed-to-chair/chair-to-bed;walker, rolling  -     [place] Level/Cues Needed (Transfer Goal 1, PT)  contact guard assist  -JE     Time Frame (Transfer Goal 1, PT)  long term goal (LTG);10 days  -JE     Progress/Outcome (Transfer Goal 1, PT)  goal ongoing  -     Row Name 04/12/21 1147          Gait Training Goal 1 (PT)    Activity/Assistive Device (Gait Training Goal 1, PT)  gait (walking locomotion);assistive device use;decrease fall risk;diminish gait deviation;improve balance and speed;increase endurance/gait distance;walker, rolling  -     [place] Level (Gait Training Goal 1, PT)  contact guard assist  -JE     Distance (Gait Training Goal 1, PT)  40-50 ft  -JE     Time Frame (Gait Training Goal 1, PT)  long term goal (LTG);10 days  -JE     Progress/Outcome (Gait Training Goal 1, PT)  goal ongoing  [facility]      Row Name 04/12/21 1147          Patient Education Goal (PT)    Activity (Patient Education Goal, PT)  demonstrate knowledge and verbalize spinal precautions  -JE     [place]/Cues/Accuracy (Memory Goal 2, PT)  demonstrates adequately;independent;verbalizes understanding  -JE     Time Frame (Patient Education Goal, PT)  long term goal (LTG);10 days  -JE     Progress/Outcome (Patient Education Goal, PT)  goal ongoing  -JE       User Key  (r) = Recorded By, (t) = Taken By, (c) = Cosigned By    Initials Name Provider Type    Jo-Ann Wakefield, PT Physical Therapist        Clinical Impression     Row Name 04/12/21 1147          Pain    Additional Documentation  Pain Scale: Numbers Pre/Post-Treatment (Group)  -JE     Row Name 04/12/21 1147          Pain Scale: Numbers Pre/Post-Treatment    Pretreatment Pain Rating  0/10 - no pain  -JE     Posttreatment Pain Rating  0/10 - no pain  -JE     Pre/Posttreatment Pain Comment  no reported pain (difficult to assess), however, pt very fearful and resistant to movement; frequently calls out for Leydi and prays to God  -     Pain Intervention(s)  Repositioned;Rest RN aware of pt's reaction to activity and need for much encouragement and decrease cognition  -[facility] Name 04/12/21 1147          Plan of Care Review    Plan of Care Reviewed With  patient  -JE     Progress  no change  -     Outcome Summary  PT eval completed.  Pt crying out for Leydi, his wife right before therapy entered room.  Attempted to comfort pt and discussed w/ RN who informed pt she was going to try to call his wife soon.  Pt fearful of moving and demonstrated resistance requiring much encouragement for participation.  Pt required assist of 2 for bed mobility, tfers and activity in standing w/ use of rwx.  Pt impulsive once steps initiated turning to sit and sit w/out warning.  Pt only tolerated taking steps bed to chair this date.   Pt education for safety/falls prevention.  Pt will benefit from  continued PT services to improve knowledge of spinal precautions, safety/falls prevention, to improve activity tolerance, balance, knowledge re: use of AD, and to improve I w/ functional mobility.  Recommend continued skilled care in SNF at discharge.   Will follow for pt progress.  -     Row Name 04/12/21 1147          Therapy Assessment/Plan (PT)    Patient/Family Therapy Goals Statement (PT)  to be with Leydi  -GEOVANNY     Rehab Potential (PT)  fair, will monitor progress closely  -     Criteria for Skilled Interventions Met (PT)  yes;meets criteria;skilled treatment is necessary  -     Predicted Duration of Therapy Intervention (PT)  until discharge or goals achieved  -     Row Name 04/12/21 1147          Vital Signs    O2 Delivery Pre Treatment  room air  -JE     O2 Delivery Intra Treatment  room air  -JE     O2 Delivery Post Treatment  room air  -JE     Pre Patient Position  Supine  -     Intra Patient Position  Standing  -     Post Patient Position  Sitting  -     Row Name 04/12/21 1147          Positioning and Restraints    Pre-Treatment Position  in bed  -JE     Post Treatment Position  chair  -     In Chair  reclined;call light within reach;encouraged to call for assist;exit alarm on;with nsg;legs elevated prepared to eat lunch  -       User Key  (r) = Recorded By, (t) = Taken By, (c) = Cosigned By    Initials Name Provider Type    Jo-Ann Wakefield, PT Physical Therapist        Outcome Measures     Row Name 04/12/21 1142          How much help from another person do you currently need...    Turning from your back to your side while in flat bed without using bedrails?  2  -JE     Moving from lying on back to sitting on the side of a flat bed without bedrails?  2  -JE     Moving to and from a bed to a chair (including a wheelchair)?  2  -JE     Standing up from a chair using your arms (e.g., wheelchair, bedside chair)?  2  -JE     Climbing 3-5 steps with a railing?  1  -JE     To walk in  hospital room?  2  -[facility] 6 Clicks Score (PT)  11  -     Row Name 04/12/21 1147          Functional Assessment    Outcome Measure Options  AM-PAC 6 Clicks Basic Mobility (PT)  -       User Key  (r) = Recorded By, (t) = Taken By, (c) = Cosigned By    Initials Name Provider Type    Jo-Ann Wakefield, PT Physical Therapist        Physical Therapy Education                 Title: PT OT SLP Therapies (In Progress)     Topic: Physical Therapy (In Progress)     Point: Mobility training (In Progress)     Learning Progress Summary           Patient Acceptance, E,TB,D, NR by GEOVANNY at 4/12/2021 1357    Comment: Education re: purpose of PT/importance of activity; education re: spinal precautions and improved tech w/ bed mob, tfers and use of rwx w/ standing activity; education for safety/falls prevention w/ functional mobility                   Point: Home exercise program (Not Started)     Learner Progress:  Not documented in this visit.          Point: Body mechanics (In Progress)     Learning Progress Summary           Patient Acceptance, E,TB,D, NR by GEOVANNY at 4/12/2021 1357    Comment: Education re: purpose of PT/importance of activity; education re: spinal precautions and improved tech w/ bed mob, tfers and use of rwx w/ standing activity; education for safety/falls prevention w/ functional mobility                   Point: Precautions (In Progress)     Learning Progress Summary           Patient Acceptance, E,TB,D, NR by GEOVANNY at 4/12/2021 1357    Comment: Education re: purpose of PT/importance of activity; education re: spinal precautions and improved tech w/ bed mob, tfers and use of rwx w/ standing activity; education for safety/falls prevention w/ functional mobility                               User Key     Initials Effective Dates Name Provider Type Wu     08/02/18 -  Jo-Ann Barraza, PT Physical Therapist PT              PT Recommendation and Plan  Planned Therapy Interventions (PT): balance  training, bed mobility training, gait training, patient/family education, postural re-education, strengthening, transfer training, other (see comments) (safety/falls prevention; spinal precautions)  Plan of Care Reviewed With: patient  Progress: no change  Outcome Summary: PT eval completed.  Pt crying out for Leydi, his wife right before therapy entered room.  Attempted to comfort pt and discussed w/ RN who informed pt she was going to try to call his wife soon.  Pt fearful of moving and demonstrated resistance requiring much encouragement for participation.  Pt required assist of 2 for bed mobility, tfers and activity in standing w/ use of rwx.  Pt impulsive once steps initiated turning to sit and sit w/out warning.  Pt only tolerated taking steps bed to chair this date.   Pt education for safety/falls prevention.  Pt will benefit from continued PT services to improve knowledge of spinal precautions, safety/falls prevention, to improve activity tolerance, balance, knowledge re: use of AD, and to improve I w/ functional mobility.  Recommend continued skilled care in SNF at discharge.   Will follow for pt progress.     Time Calculation:   PT Charges     Row Name 04/12/21 1345             Time Calculation    Start Time  1147 10 min chart review  -      Stop Time  1226  -      Time Calculation (min)  39 min  -      PT Received On  04/12/21  -      PT Goal Re-Cert Due Date  04/22/21  -        User Key  (r) = Recorded By, (t) = Taken By, (c) = Cosigned By    Initials Name Provider Type    Jo-Ann Wakefield, PT Physical Therapist        Therapy Charges for Today     Code Description Service Date Service Provider Modifiers Qty    26337020021 HC PT KRANTHI MOD COMPLEXITY 3 4/12/2021 Jo-Ann Barraza, PT GP 1          PT G-Codes  Outcome Measure Options: AM-PAC 6 Clicks Basic Mobility (PT)  AM-PAC 6 Clicks Score (PT): 11  AM-PAC 6 Clicks Score (OT): 14    Jo-Ann Barraza PT  4/12/2021

## 2021-04-12 NOTE — PROGRESS NOTES
Patient is a [facility] member of [id number].  Navigators visited with patient.   notified of patient's admission and location. May contact navigators at [phone] or [phone] for any needs.  Will continue to follow for assessment of possible needs from [employer].

## 2021-04-12 NOTE — PLAN OF CARE
Goal Outcome Evaluation:  Plan of Care Reviewed With: patient  Progress: no change  Outcome Summary: OT txt completed. Pt very upset and confused upon entering room, physical touch and therapeutic use of self utilized to redirect pt throughout session for encouragement of increased activity. Pt ModAx2 to come to EOB, modAx2 to stand and take steps to chair. Impulsively sits too early from chair requiring assist to safely get to chair. Dep for LB dressing. Confusion limiting progress this date. Pt will need SNF at d/c. Cnt OT POC.

## 2021-04-12 NOTE — PROGRESS NOTES
"Dayday Andrews  80 y.o.      Chief complaint:   L2 compression fracture status post fall, urinary retention    Subjective:  Symptoms:  Stable.    Diet:  Adequate intake.    Activity level: Impaired due to weakness.    Pain:  He reports no pain.      No events    Temp:  [97.4 °F (36.3 °C)-98.2 °F (36.8 °C)] 97.4 °F (36.3 °C)  Heart Rate:  [76-80] 77  Resp:  [18] 18  BP: (149-156)/(69-83) 154/80      Objective:  General Appearance:  Comfortable, well-appearing, in no acute distress and not in pain.    Vital signs: (most recent): Blood pressure 154/80, pulse 77, temperature 97.4 °F (36.3 °C), temperature source Oral, resp. rate 18, height 185.4 cm (73\"), weight 102 kg (225 lb 12.8 oz), SpO2 97 %.  Vital signs are normal.  No fever.    Output: Producing urine.    HEENT: Normal HEENT exam.    Lungs:  Normal effort and normal respiratory rate.  He is not in respiratory distress.    Heart: Normal rate.  Regular rhythm.    Chest: Symmetric chest wall expansion.   Skin:  Warm and dry.    Abdomen: Abdomen is soft and non-distended.  Bowel sounds are normal.   There is no abdominal tenderness.     Pulses: Distal pulses are intact.        Neurologic Exam     Mental Status   Oriented to person.   Disoriented to place.   Oriented to year.   Attention: normal. Concentration: normal.   Speech: speech is normal   Level of consciousness: alert  Abnormal comprehension.     Cranial Nerves     CN II   Visual fields full to confrontation.     CN III, IV, VI   Pupils are equal, round, and reactive to light.  Extraocular motions are normal.     CN V   Facial sensation intact.     CN VII   Facial expression full, symmetric.     CN VIII   CN VIII normal.     CN IX, X   CN IX normal.   CN X normal.     CN XI   CN XI normal.     CN XII   CN XII normal.     Motor Exam   Muscle bulk: normal    Strength   Strength 5/5 throughout.     Sensory Exam   Light touch normal.     Gait, Coordination, and Reflexes     Reflexes   Reflexes 2+ except as " noted.       Lab Results (last 24 hours)     Procedure Component Value Units Date/Time    Extra Tubes [[id number]] Collected: 04/12/21 0438    Specimen: Blood, Venous Line Updated: 04/12/21 0600    Narrative:      The following orders were created for panel order Extra Tubes.  Procedure                               Abnormality         Status                     ---------                               -----------         ------                     Lavender Top[[id number]]                                     Final result                 Please view results for these tests on the individual orders.    Lavender Top [[id number]] Collected: 04/12/21 0438    Specimen: Blood Updated: 04/12/21 0600     Extra Tube hold for add-on     Comment: Auto resulted       Basic Metabolic Panel [[id number]]  (Abnormal) Collected: 04/12/21 0438    Specimen: Blood Updated: 04/12/21 0533     Glucose 158 mg/dL      BUN 9 mg/dL      Creatinine 0.87 mg/dL      Sodium 140 mmol/L      Potassium 3.9 mmol/L      Chloride 102 mmol/L      CO2 27.0 mmol/L      Calcium 9.0 mg/dL      eGFR Non African Amer 85 mL/min/1.73      BUN/Creatinine Ratio 10.3     Anion Gap 11.0 mmol/L     Narrative:      GFR Normal >60  Chronic Kidney Disease <60  Kidney Failure <15      Magnesium [[id number]]  (Normal) Collected: 04/12/21 0438    Specimen: Blood Updated: 04/12/21 0533     Magnesium 2.1 mg/dL     POC Glucose Once [[id number]]  (Abnormal) Collected: 04/11/21 2030    Specimen: Blood Updated: 04/11/21 2050     Glucose 158 mg/dL      Comment: : [id number] Lila Gerard (Drury) AnnMeter ID: [id number]       POC Glucose Once [[id number]]  (Abnormal) Collected: 04/11/21 1635    Specimen: Blood Updated: 04/11/21 1650     Glucose 159 mg/dL      Comment: : [id number] Edward PedroandaMeter ID: [id number]       Blood Culture - Blood, Arm, Left [[id number]] Collected: 04/10/21 1355    Specimen: Blood from Arm, Left Updated: 04/11/21 1415     Blood Culture No growth at 24  hours    Blood Culture - Blood, Arm, Right [[id number]] Collected: 04/10/21 1355    Specimen: Blood from Arm, Right Updated: 04/11/21 1415     Blood Culture No growth at 24 hours              Plan:   CV: Heart rate and blood pressure stable  Respiratory: Oxygen level stable  GI: Tolerating p.o.  : Urinary retention  Neuro: Exam stable  Continue with physical and Occupational Therapy.  Patient is not complaining of any meaningful pain this morning.  We will see how he does with therapy today.  Plan is for patient to go to nursing rehab facility      Frequent falls    Sensorineural hearing loss (SNHL) of both ears    HTN (hypertension), benign    Controlled type 2 diabetes mellitus without complication, without long-term current use of insulin (CMS/HCC)    Paroxysmal atrial fibrillation (CMS/HCC)    History of pulmonary embolism    Inability to perform activities of daily living    Dysmetria    UTI (urinary tract infection) due to urinary indwelling catheter (CMS/HCC)    Generalized weakness    Ulcer of right foot, limited to breakdown of skin (CMS/HCC)    Failure to thrive in adult    Dehydration with poor PO intake, hemoconcentration, tachycardia and dry mucus membranes    Urinary retention    Dysphagia        Fabian Georges, APRN

## 2021-04-13 NOTE — PLAN OF CARE
Goal Outcome Evaluation:         Oriented to self and year, not place or situation.  Confused.  Chronic Burris in place.  FC completed.  Incontinent of bowel.  Getting OO bed several times tonight and pulling gown off.  Bed alarm on. Dressing applied to third left toe.  Cleansed with NS, bacterin applied, Vaseline gauze, gauze, Kerlix, and Medpor tape. Mycostatin power to groin area, excoriated.   Patient trying to remove dressing. Blood glucose monitoring.  BS elevated.  Will continue to monitor.  .

## 2021-04-13 NOTE — THERAPY TREATMENT NOTE
Patient Name: Dayday Andrews  : 1940    MRN: [id number]                              Today's Date: 2021       Admit Date: 4/10/2021    Visit Dx:     ICD-10-CM ICD-9-CM   1. Generalized weakness  R53.1 780.79   2. Falls frequently  R29.6 V15.88   3. Decreased activities of daily living (ADL)  Z78.9 V49.89   4. Dysphagia, unspecified type  R13.10 787.20   5. Gait abnormality  R26.9 781.2   6. Ulcer of right foot, limited to breakdown of skin (CMS/HCC)  L97.511 707.15     Patient Active Problem List   Diagnosis   • Dysfunction of right eustachian tube   • Allergic rhinitis   • Sensorineural hearing loss (SNHL) of both ears   • Hx of adenomatous colonic polyps   • HTN (hypertension), benign   • Controlled type 2 diabetes mellitus without complication, without long-term current use of insulin (CMS/HCC)   • Paroxysmal atrial fibrillation (CMS/HCC)   • Atrial flutter (CMS/HCC)   • Acute respiratory failure with hypoxia (CMS/HCC)   • History of pulmonary embolism   • Inability to perform activities of daily living   • Dysmetria   • UTI (urinary tract infection) due to urinary indwelling catheter (CMS/HCC)   • Generalized weakness   • Ulcer of right foot, limited to breakdown of skin (CMS/HCC)   • Failure to thrive in adult   • Dehydration with poor PO intake, hemoconcentration, tachycardia and dry mucus membranes   • Urinary retention   • Dysphagia   • Frequent falls     Past Medical History:   Diagnosis Date   • Arthritis    • Diabetes (CMS/HCC)    • High blood pressure    • Hx of colonic polyp    • Sleep apnea      Past Surgical History:   Procedure Laterality Date   • COLONOSCOPY N/A 2018    Procedure: COLONOSCOPY WITH ANESTHESIA;  Surgeon: Lauri Bourne MD;  Location: [facility] ENDOSCOPY;  Service: Gastroenterology   • COLONOSCOPY W/ POLYPECTOMY  2015    3 Tubular adenomas Ileocecal valve and at 80 cm repeat exam in 3 years   • EKOS CATHETER PLACEMENT Bilateral 1/15/2021    Procedure: Ekos  catheter placement;  Surgeon: Raj Aldridge MD;  Location:  [facility] CATH INVASIVE LOCATION;  Service: Cardiovascular;  Laterality: Bilateral;   • KNEE SURGERY       General Information    No documentation.       Mobility    No documentation.       Obj/Interventions    No documentation.       Goals/Plan    No documentation.       Clinical Impression    No documentation.       Outcome Measures    No documentation.       Physical Therapy Education                 Title: PT OT SLP Therapies (In Progress)     Topic: Physical Therapy (In Progress)     Point: Mobility training (Done)     Learning Progress Summary           Patient Acceptance, E, VU,DU by GP at 4/12/2021 1406    Comment: Educated Pt on posture while standing    Acceptance, E,TB,D, NR by JE at 4/12/2021 1357    Comment: Education re: purpose of PT/importance of activity; education re: spinal precautions and improved tech w/ bed mob, tfers and use of rwx w/ standing activity; education for safety/falls prevention w/ functional mobility                   Point: Home exercise program (Not Started)     Learner Progress:  Not documented in this visit.          Point: Body mechanics (In Progress)     Learning Progress Summary           Patient Acceptance, E,TB,D, NR by JE at 4/12/2021 1357    Comment: Education re: purpose of PT/importance of activity; education re: spinal precautions and improved tech w/ bed mob, tfers and use of rwx w/ standing activity; education for safety/falls prevention w/ functional mobility                   Point: Precautions (In Progress)     Learning Progress Summary           Patient Acceptance, E,TB,D, NR by JE at 4/12/2021 1357    Comment: Education re: purpose of PT/importance of activity; education re: spinal precautions and improved tech w/ bed mob, tfers and use of rwx w/ standing activity; education for safety/falls prevention w/ functional mobility                               User Key     Initials Effective Dates Name  Provider Type Discipline    JE 08/02/18 -  Jo-Ann Barraza, PT Physical Therapist PT    GP 03/23/21 -  Juanjose Ambrocio PTA Student PTA Student PT              PT Recommendation and Plan     Plan of Care Reviewed With: patient  Progress: improving  Outcome Summary: Pt sitting EOB w/ OT. Pt has a posterior lean and to the left requiring cues for sitting balance. sit-stand mod x2 w/ posterior lean but once pt able to wt shift and begin amb required min x1 and followed w/ chair pt amb 40'x2 rwx cues for safet pt has decreased step length and slow pace. pt would benefit from snf upon d/c     Time Calculation:   PT Charges     Row Name 04/13/21 0917             Time Calculation    Start Time  0855  -NW      Stop Time  0918  -NW      Time Calculation (min)  23 min  -NW      PT Received On  04/13/21  -NW      PT Goal Re-Cert Due Date  04/22/21  -NW         Time Calculation- PT    Total Timed Code Minutes- PT  23 minute(s)  -NW         Timed Charges    82965 - PT Therapeutic Exercise Minutes  8  -NW      32829 - Gait Training Minutes   15  -NW        User Key  (r) = Recorded By, (t) = Taken By, (c) = Cosigned By    Initials Name Provider Type    NW Estrellita Kerns PTA Physical Therapy Assistant        Therapy Charges for Today     Code Description Service Date Service Provider Modifiers Qty    46334084597 HC PT THER PROC EA 15 MIN 4/13/2021 Estrellita Kerns PTA GP 1    19677895630 HC GAIT TRAINING EA 15 MIN 4/13/2021 Estrellita Kerns PTA GP 1          PT G-Codes  Outcome Measure Options: AM-PAC 6 Clicks Basic Mobility (PT)  AM-PAC 6 Clicks Score (PT): 11  AM-PAC 6 Clicks Score (OT): 14    Estrellita Kerns PTA  4/13/2021

## 2021-04-13 NOTE — PROGRESS NOTES
"Dayday Andrews  80 y.o.      Chief complaint:   Compression fracture status post fall, urinary retention    Subjective:  Symptoms:  Stable.    Diet:  Adequate intake.    Activity level: Impaired due to weakness.    Pain:  He reports no pain.      No events    Temp:  [97.5 °F (36.4 °C)-98.5 °F (36.9 °C)] 98 °F (36.7 °C)  Heart Rate:  [75-82] 80  Resp:  [17-18] 18  BP: (134-170)/(73-89) 147/75      Objective:  General Appearance:  Comfortable, well-appearing, in no acute distress and not in pain.    Vital signs: (most recent): Blood pressure 147/75, pulse 80, temperature 98 °F (36.7 °C), temperature source Oral, resp. rate 18, height 185.4 cm (73\"), weight 102 kg (225 lb 12.8 oz), SpO2 95 %.  Vital signs are normal.  No fever.    Output: Producing urine.    HEENT: Normal HEENT exam.    Lungs:  Normal effort and normal respiratory rate.  He is not in respiratory distress.    Heart: Normal rate.  Regular rhythm.    Chest: Symmetric chest wall expansion.   Skin:  Warm and dry.    Abdomen: Abdomen is soft and non-distended.  Bowel sounds are normal.   There is no abdominal tenderness.     Pulses: Distal pulses are intact.        Neurologic Exam     Mental Status   Oriented to person.   Disoriented to place.   Oriented to year.   Attention: normal. Concentration: normal.   Speech: speech is normal   Level of consciousness: alert  Abnormal comprehension.     Cranial Nerves     CN II   Visual fields full to confrontation.     CN III, IV, VI   Pupils are equal, round, and reactive to light.  Extraocular motions are normal.     CN V   Facial sensation intact.     CN VII   Facial expression full, symmetric.     CN VIII   CN VIII normal.     CN IX, X   CN IX normal.   CN X normal.     CN XI   CN XI normal.     CN XII   CN XII normal.     Motor Exam   Muscle bulk: normal    Strength   Strength 5/5 throughout.     Sensory Exam   Light touch normal.     Gait, Coordination, and Reflexes     Reflexes   Reflexes 2+ except as noted. "       Lab Results (last 24 hours)     Procedure Component Value Units Date/Time    Basic Metabolic Panel [[id number]]  (Abnormal) Collected: 04/13/21 0342    Specimen: Blood Updated: 04/13/21 0414     Glucose 158 mg/dL      BUN 11 mg/dL      Creatinine 0.90 mg/dL      Sodium 137 mmol/L      Potassium 3.9 mmol/L      Chloride 98 mmol/L      CO2 26.0 mmol/L      Calcium 9.0 mg/dL      eGFR Non African Amer 82 mL/min/1.73      BUN/Creatinine Ratio 12.2     Anion Gap 13.0 mmol/L     Narrative:      GFR Normal >60  Chronic Kidney Disease <60  Kidney Failure <15      CBC (No Diff) [[id number]]  (Abnormal) Collected: 04/13/21 0342    Specimen: Blood Updated: 04/13/21 0354     WBC 6.02 10*3/mm3      RBC 4.44 10*6/mm3      Hemoglobin 12.9 g/dL      Hematocrit 37.6 %      MCV 84.7 fL      MCH 29.1 pg      MCHC 34.3 g/dL      RDW 14.0 %      RDW-SD 43.3 fl      MPV 9.2 fL      Platelets 205 10*3/mm3     POC Glucose Once [[id number]]  (Abnormal) Collected: 04/12/21 2118    Specimen: Blood Updated: 04/12/21 2129     Glucose 205 mg/dL      Comment: : [id number] Mary LoriMeter ID: [id number]       POC Glucose Once [[id number]]  (Abnormal) Collected: 04/12/21 1642    Specimen: Blood Updated: 04/12/21 1659     Glucose 195 mg/dL      Comment: : [id number] Betsey (Hard) KatyMeter ID: [id number]       Blood Culture - Blood, Arm, Left [[id number]] Collected: 04/10/21 1355    Specimen: Blood from Arm, Left Updated: 04/12/21 1415     Blood Culture No growth at 2 days    Blood Culture - Blood, Arm, Right [[id number]] Collected: 04/10/21 1355    Specimen: Blood from Arm, Right Updated: 04/12/21 1415     Blood Culture No growth at 2 days              Plan:   CV: Heart rate and blood pressure stable  Respiratory: Oxygen stable  GI: Tolerating p.o.  : Burris catheter in place, adequate urine output  Neuro: Patient confused this morning, following all commands.  In discussing patient with the nurse she states that the patient did ambulate  better in the afternoon during one of his confused episodes.  Physical therapy notes that he did walk 5 feet with minimum assist of 2.    Continue with physical and Occupational Therapy.  Waiting for placement      Frequent falls    Sensorineural hearing loss (SNHL) of both ears    HTN (hypertension), benign    Controlled type 2 diabetes mellitus without complication, without long-term current use of insulin (CMS/[facility])    Paroxysmal atrial fibrillation (CMS/[facility])    History of pulmonary embolism    Inability to perform activities of daily living    Dysmetria    UTI (urinary tract infection) due to urinary indwelling catheter (CMS/[facility])    Generalized weakness    Ulcer of right foot, limited to breakdown of skin (CMS/[facility])    Failure to thrive in adult    Dehydration with poor PO intake, hemoconcentration, tachycardia and dry mucus membranes    Urinary retention    Dysphagia        Fabian Georges, APRN

## 2021-04-13 NOTE — THERAPY DISCHARGE NOTE
Acute Care - Speech Language Pathology   Swallow Treatment Note/Discharge [facility]     Patient Name: Dayday Andrews  : 1940  MRN: [id number]  Today's Date: 2021               Admit Date: 4/10/2021  Swallowing therapy completed to ensure diet tolerance. Patient was alert, cooperative, and oriented to place, time, and self. Patient was given consistencies of regular solid food and thin liquids. Patient did not exhibit any overt signs or symptoms of aspiration during trials. Patient reports that he has had no issues eating or drinking. Patient's swallow function appears to be at baseline, so he will be discharged from speech therapy services.   Chica Saucedo, [facility]  Visit Dx:    ICD-10-CM ICD-9-CM   1. Generalized weakness  R53.1 780.79   2. Falls frequently  R29.6 V15.88   3. Decreased activities of daily living (ADL)  Z78.9 V49.89   4. Dysphagia, unspecified type  R13.10 787.20   5. Gait abnormality  R26.9 781.2   6. Ulcer of right foot, limited to breakdown of skin (CMS/HCC)  L97.511 707.15     Patient Active Problem List   Diagnosis   • Dysfunction of right eustachian tube   • Allergic rhinitis   • Sensorineural hearing loss (SNHL) of both ears   • Hx of adenomatous colonic polyps   • HTN (hypertension), benign   • Controlled type 2 diabetes mellitus without complication, without long-term current use of insulin (CMS/HCC)   • Paroxysmal atrial fibrillation (CMS/HCC)   • Atrial flutter (CMS/HCC)   • Acute respiratory failure with hypoxia (CMS/HCC)   • History of pulmonary embolism   • Inability to perform activities of daily living   • Dysmetria   • UTI (urinary tract infection) due to urinary indwelling catheter (CMS/HCC)   • Generalized weakness   • Ulcer of right foot, limited to breakdown of skin (CMS/HCC)   • Failure to thrive in adult   • Dehydration with poor PO intake, hemoconcentration, tachycardia and dry mucus membranes   • Urinary retention   • Dysphagia   • Frequent falls     Past Medical  History:   Diagnosis Date   • Arthritis    • Diabetes (CMS/HCC)    • High blood pressure    • Hx of colonic polyp    • Sleep apnea      Past Surgical History:   Procedure Laterality Date   • COLONOSCOPY N/A 12/13/2018    Procedure: COLONOSCOPY WITH ANESTHESIA;  Surgeon: Lauri Bourne MD;  Location: [facility] ENDOSCOPY;  Service: Gastroenterology   • COLONOSCOPY W/ POLYPECTOMY  09/22/2015    3 Tubular adenomas Ileocecal valve and at 80 cm repeat exam in 3 years   • EKOS CATHETER PLACEMENT Bilateral 1/15/2021    Procedure: Ekos catheter placement;  Surgeon: Raj Aldrdige MD;  Location: [facility] CATH INVASIVE LOCATION;  Service: Cardiovascular;  Laterality: Bilateral;   • KNEE SURGERY            SWALLOW EVALUATION (last 72 hours)      [facility] Adult Swallow Evaluation     Row Name 04/13/21 1005 04/11/21 0952                Rehab Evaluation    Document Type  therapy note (daily note)  (Pended)   -  evaluation  -MB       Subjective Information  no complaints  (Pended)   -  no complaints  -MB       Patient Observations  alert;cooperative;agree to therapy  (Pended)   -  alert;cooperative  -MB       Patient/Family/Caregiver Comments/Observations  --  Femal visitor at bedside  -MB       Care Plan Review  care plan/treatment goals reviewed  (Pended)   -  --       Patient Effort  good  (Pended)   -  --          General Information    Patient Profile Reviewed  --  yes  -MB       Pertinent History Of Current Problem  --  Fall with abrasion to bridge of nose, failure to thrive, acute cystitis, L2 fracture on 03/29/21, CXR: no acute process, chronic indwelling Burris, DM, HTN, sleep apnea, sensorineural hearing loss. Reported dysphagia with liquids.   -MB       Current Method of Nutrition  --  regular textures;thin liquids  -MB       Precautions/Limitations, Vision  --  WFL;for purposes of eval  -MB       Precautions/Limitations, Hearing  --  hearing impairment, bilaterally  -MB       Prior Level of Function-Communication  --   cognitive-linguistic impairment;other (see comments) Visitor reports pt more confused today  -MB       Prior Level of Function-Swallowing  --  no diet consistency restrictions  -MB       Plans/Goals Discussed with  --  patient and family  -MB       Barriers to Rehab  --  cognitive status;hearing deficit  -MB       Patient's Goals for Discharge  --  patient did not state  -MB       Family Goals for Discharge  --  family did not state  -MB          Pain    Additional Documentation  Pain Scale: FACES Pre/Post-Treatment (Group)  (Pended)   -  Pain Scale: FACES Pre/Post-Treatment (Group)  -MB          Pain Scale: FACES Pre/Post-Treatment    Pain: FACES Scale, Pretreatment  0-->no hurt  (Pended)   -  2-->hurts little bit  -MB       Posttreatment Pain Rating  0-->no hurt  (Pended)   -  --          Oral Motor Structure and Function    Dentition Assessment  --  natural, present and adequate  -MB       Secretion Management  --  WNL/WFL  -MB       Mucosal Quality  --  moist, healthy  -MB          Oral Musculature and Cranial Nerve Assessment    Oral Motor General Assessment  --  oral labial or buccal impairment;lingual impairment  -MB       Oral Labial or Buccal Impairment, Detail, Cranial Nerve VII (Facial):  --  reduced ROM;reduced strength bilaterally  -MB       Lingual Impairment, Detail. Cranial Nerves IX, XII (Glossopharyngeal and Hypoglossal)  --  reduced lingual ROM;reduced strength;bilaterally  -MB          General Eating/Swallowing Observations    Respiratory Support Currently in Use  --  room air  -MB       Eating/Swallowing Skills  --  fed by SLP;self-fed  -MB       Positioning During Eating  --  upright in bed  -MB       Utensils Used  --  spoon;straw  -MB       Consistencies Trialed  --  regular textures;thin liquids;nectar/syrup-thick liquids;honey-thick liquids;pudding thick  -MB          Clinical Swallow Eval    Oral Prep Phase  --  WFL  -MB       Oral Transit  --  WFL  -MB       Oral Residue  --  WFL   -MB       Pharyngeal Phase  --  no overt signs/symptoms of pharyngeal impairment  -MB       Esophageal Phase  --  unremarkable  -MB       Clinical Swallow Evaluation Summary  --  Full range of consistencies except mechanical soft solids presented. No overt s/s of aspiration were observed. Pt had adequate mastication and clearance of the regular solid. Pt initially reported occasional difficulty swallowing liquids, then later in the evaluation stated he sometimes has difficulty with cornbread. Again, no overt s/s of aspiration were observed with any consistency during assessment and pt's chest xray shows no acute process.   -MB          Clinical Impression    SLP Swallowing Diagnosis  --  functional oral phase;R/O pharyngeal dysphagia  -MB       Functional Impact  --  risk of aspiration/pneumonia;risk of malnutrition;risk of dehydration  -MB       Rehab Potential/Prognosis, Swallowing  --  good, to achieve stated therapy goals  -MB       Swallow Criteria for Skilled Therapeutic Interventions Met  --  demonstrates skilled criteria  -MB          Recommendations    Therapy Frequency (Swallow)  --  PRN  -MB       Predicted Duration Therapy Intervention (Days)  --  until discharge  -MB       SLP Diet Recommendation  --  regular textures;thin liquids  -MB       Recommended Diagnostics  --  SLE/Cog/Motor Speech Evaluation  -MB       Recommended Precautions and Strategies  --  upright posture during/after eating;small bites of food and sips of liquid;alternate between small bites of food and sips of liquid  -MB       Oral Care Recommendations  --  Oral Care BID/PRN  -MB       SLP Rec. for Method of Medication Administration  --  meds whole;with pudding or applesauce  -MB       Monitor for Signs of Aspiration  --  yes;cough;gurgly voice;throat clearing;pneumonia;notify SLP if any concerns  -MB       Anticipated Discharge Disposition (SLP)  --  skilled nursing facility  -MB          Swallow Goals (SLP)    Oral  Nutrition/Hydration Goal Selection (SLP)  oral nutrition/hydration, SLP goal 1  (Pended)   -SW  oral nutrition/hydration, SLP goal 1  -MB          Oral Nutrition/Hydration Goal 1 (SLP)    Oral Nutrition/Hydration Goal 1, SLP  Pt will tolerate least restrictive diet with no overt s/s of aspiration.   (Pended)   -SW  Pt will tolerate least restrictive diet with no overt s/s of aspiration.   -MB       Time Frame (Oral Nutrition/Hydration Goal 1, SLP)  by discharge  (Pended)   -SW  by discharge  -MB       Barriers (Oral Nutrition/Hydration Goal 1, SLP)  n/a  (Pended)   -SW  n/a  -MB       Progress/Outcomes (Oral Nutrition/Hydration Goal 1, SLP)  goal ongoing;continuing progress toward goal  (Pended)   -SW  goal ongoing  -MB         User Key  (r) = Recorded By, (t) = Taken By, (c) = Cosigned By    Initials Name Effective Dates    Suzette Bunch, CCC-SLP 08/02/16 -     Chica Mccray, Speech Therapy Student 01/18/21 -           EDUCATION  The patient has been educated in the following areas:   Dysphagia (Swallowing Impairment).    SLP Recommendation and Plan                    Anticipated Discharge Disposition (SLP): (P) skilled nursing facility                    Anticipated Discharge Disposition (SLP): (P) skilled nursing facility        Reason for Discharge: (P) all goals and outcomes met, no further needs identified    Plan of Care Reviewed With: (P) patient  Progress: (P) improving    SLP GOALS     Row Name 04/13/21 1005 04/11/21 0952          Oral Nutrition/Hydration Goal 1 (SLP)    Oral Nutrition/Hydration Goal 1, SLP  Pt will tolerate least restrictive diet with no overt s/s of aspiration.   (Pended)   -SW  Pt will tolerate least restrictive diet with no overt s/s of aspiration.   -MB     Time Frame (Oral Nutrition/Hydration Goal 1, SLP)  by discharge  (Pended)   -SW  by discharge  -MB     Barriers (Oral Nutrition/Hydration Goal 1, SLP)  n/a  (Pended)   -SW  n/a  -MB     Progress/Outcomes (Oral  Nutrition/Hydration Goal 1, SLP)  goal ongoing;continuing progress toward goal  (Pended)   -  goal ongoing  -MB       User Key  (r) = Recorded By, (t) = Taken By, (c) = Cosigned By    Initials Name Provider Type    Suzette Bunch CCC-SLP Speech and Language Pathologist    Chica Mccray Speech Therapy Student Speech Therapy Student               Time Calculation:   Time Calculation- SLP     Row Name 04/13/21 1100             Time Calculation- SLP    SLP Start Time  1005  (Pended)   -SW      SLP Stop Time  1015  (Pended)   -      SLP Time Calculation (min)  10 min  (Pended)   -      SLP Received On  04/13/21  (Pended)   -        User Key  (r) = Recorded By, (t) = Taken By, (c) = Cosigned By    Initials Name Provider Type    Chica Mccray Speech Therapy Student Speech Therapy Student                   SLP Discharge Summary  Anticipated Discharge Disposition (SLP): (P) skilled nursing facility  Reason for Discharge: (P) all goals and outcomes met, no further needs identified  Progress Toward Achieving Short/long Term Goals: (P) all goals met within established timelines  Discharge Destination: (P) SNF    Chica Saucedo Speech Therapy Desire  4/13/2021

## 2021-04-13 NOTE — PROGRESS NOTES
Continued Stay Note   [place]     Patient Name: Dayday Andrews  MRN: [id number]  Today's Date: 4/13/2021    Admit Date: 4/10/2021    Discharge Plan     Row Name 04/13/21 1139       Plan    Plan  [facility]    Patient/Family in Agreement with Plan  yes    Plan Comments  Pt can go to [facility] on Thursday. He is also followed by [facility]  so will inform Patti the plan for snf.        Discharge Codes    No documentation.             SHY Luevano

## 2021-04-13 NOTE — THERAPY TREATMENT NOTE
Acute Care - Occupational Therapy Treatment Note  [facility]     Patient Name: Dayday Andrews  : 1940  MRN: [id number]  Today's Date: 2021             Admit Date: 4/10/2021       ICD-10-CM ICD-9-CM   1. Generalized weakness  R53.1 780.79   2. Falls frequently  R29.6 V15.88   3. Decreased activities of daily living (ADL)  Z78.9 V49.89   4. Dysphagia, unspecified type  R13.10 787.20   5. Gait abnormality  R26.9 781.2   6. Ulcer of right foot, limited to breakdown of skin (CMS/HCC)  L97.511 707.15     Patient Active Problem List   Diagnosis   • Dysfunction of right eustachian tube   • Allergic rhinitis   • Sensorineural hearing loss (SNHL) of both ears   • Hx of adenomatous colonic polyps   • HTN (hypertension), benign   • Controlled type 2 diabetes mellitus without complication, without long-term current use of insulin (CMS/HCC)   • Paroxysmal atrial fibrillation (CMS/HCC)   • Atrial flutter (CMS/HCC)   • Acute respiratory failure with hypoxia (CMS/HCC)   • History of pulmonary embolism   • Inability to perform activities of daily living   • Dysmetria   • UTI (urinary tract infection) due to urinary indwelling catheter (CMS/HCC)   • Generalized weakness   • Ulcer of right foot, limited to breakdown of skin (CMS/HCC)   • Failure to thrive in adult   • Dehydration with poor PO intake, hemoconcentration, tachycardia and dry mucus membranes   • Urinary retention   • Dysphagia   • Frequent falls     Past Medical History:   Diagnosis Date   • Arthritis    • Diabetes (CMS/HCC)    • High blood pressure    • Hx of colonic polyp    • Sleep apnea      Past Surgical History:   Procedure Laterality Date   • COLONOSCOPY N/A 2018    Procedure: COLONOSCOPY WITH ANESTHESIA;  Surgeon: Lauri Bourne MD;  Location: [facility] ENDOSCOPY;  Service: Gastroenterology   • COLONOSCOPY W/ POLYPECTOMY  2015    3 Tubular adenomas Ileocecal valve and at 80 cm repeat exam in 3 years   • EKOS CATHETER PLACEMENT Bilateral  1/15/2021    Procedure: Ekos catheter placement;  Surgeon: Raj Aldridge MD;  Location:  [facility] CATH INVASIVE LOCATION;  Service: Cardiovascular;  Laterality: Bilateral;   • KNEE SURGERY              OT ASSESSMENT FLOWSHEET (last 12 hours)      OT Evaluation and Treatment     Row Name 04/13/21 0832                   OT Time and Intention    Subjective Information  complains of;pain  -MW        Document Type  therapy note (daily note)  -        Mode of Treatment  occupational therapy  -           General Information    Existing Precautions/Restrictions  fall;spinal  -MW           Cognition    Personal Safety Interventions  fall prevention program maintained;gait belt;muscle strengthening facilitated;nonskid shoes/slippers when out of bed;supervised activity;elopement precautions initiated  -MW           Pain Assessment    Additional Documentation  Pain Scale: FACES Pre/Post-Treatment (Group)  -           Pain Scale: FACES Pre/Post-Treatment    Pain: FACES Scale, Pretreatment  2-->hurts little bit  -MW        Posttreatment Pain Rating  2-->hurts little bit  -MW        Pain Location - Orientation  lower  -MW        Pain Location  back  -MW           Bed Mobility    Bed Mobility  rolling left;sidelying-sit  -        Rolling Left [place] (Bed Mobility)  minimum assist (75% patient effort);verbal cues  -        Sidelying-Sit [place] (Bed Mobility)  moderate assist (50% patient effort);2 person assist;verbal cues;nonverbal cues (demo/gesture)  -        Comment (Bed Mobility)  pt requires max vc for sequencing of log roll tech.; coming to sitting pt stops sitting up midway requiring two person assist and max vc to come fully to sitting  -           Functional Mobility    Functional Mobility- Comment  walked short distance in [place] with assist from PTAs with chair follow from OT, required seated rest with return to room  -           Transfer Assessment/Treatment    Transfers  sit-stand  transfer;stand-sit transfer  -        Comment (Transfers)  pulls with two hands from RW  -MW           Transfers    Sit-Stand [place] (Transfers)  minimum assist (75% patient effort);verbal cues;nonverbal cues (demo/gesture);moderate assist (50% patient effort);2 person assist  -MW           Sit-Stand Transfer    Assistive Device (Sit-Stand Transfers)  walker, front-wheeled  -MW           Activities of Daily Living    BADL Assessment/Intervention  lower body dressing;grooming  -MW           Lower Body Dressing Assessment/Training    [place] Level (Lower Body Dressing)  dependent (less than 25% patient effort);socks  -        Position (Lower Body Dressing)  edge of bed sitting  -MW           Grooming Assessment/Training    [place] Level (Grooming)  hair care, combing/brushing;oral care regimen;wash face, hands;set up;contact guard assist;verbal cues  -MW        Position (Grooming)  edge of bed sitting  -MW        Comment (Grooming)  CGA for sitting balance, min vc for sequencing of tasks  -MW           Wound 04/10/21 2231 Left coccyx    Wound - Properties Group Placement Date: 04/10/21  -AD Placement Time: 2231 -AD Present on Hospital Admission: Y  -AD Side: Left  -AD Location: coccyx  -AD    Retired Wound - Properties Group Date first assessed: 04/10/21  -AD Time first assessed: 2231 -AD Present on Hospital Admission: Y  -AD Side: Left  -AD Location: coccyx  -AD       Wound 04/10/21 2236 Left anterior third toe    Wound - Properties Group Placement Date: 04/10/21  -AD Placement Time: 2236 -AD Side: Left  -AD Orientation: anterior  -AD Location: third toe  -AD    Retired Wound - Properties Group Date first assessed: 04/10/21  -AD Time first assessed: 2236 -AD Side: Left  -AD Location: third toe  -AD       Plan of Care Review    Plan of Care Reviewed With  patient  -MW        Progress  improving  -        Outcome Summary  OT txt completed. Pt awake and alert in fowlers, brother present and pt  is good spirits and calm. Initially Jairo to roll and initiate coming to EOB, however once legs off side of bed and initiating sitting up pt abruptly stops and begins pushing back into sidelying. Requires assist x2 to come to EOB and max vc for processing. Demos L lateral and posterior lean in sitting requiring Jairo progressing to SBA for sitting balance. Performed 1x10 reps shoulder flex, 2x20 reps crossing midline/ipsilateral reaching to address dynamic balance and core strength. Performed oral hygiene, face washing, and combed hair with set up and SBA. Assisted to stand min-modx2 with PTA and fxl mobility in [place]. Requires seated rest to return to room 2' increased weakness, cues needed to initiate RB. Cont OT POC. Recomend d/c SNF.  -MW           Positioning and Restraints    Pre-Treatment Position  in bed  -MW        Post Treatment Position  chair  -MW        In Chair  sitting;call light within reach;encouraged to call for assist;with PT;with nsg;exit alarm on  -MW           Progress Summary (OT)    Progress Toward Functional Goals (OT)  progress toward functional goals is fair  -MW           Therapy Plan Review/Discharge Plan (OT)    Anticipated Discharge Disposition (OT)  skilled nursing facility  -          User Key  (r) = Recorded By, (t) = Taken By, (c) = Cosigned By    Initials Name Effective Dates    AD Raquel Kurtz RN 08/02/16 -     Gisele Rodríguez, OTR/L 08/28/18 -          Occupational Therapy Education                 Title: PT OT SLP Therapies (In Progress)     Topic: Occupational Therapy (In Progress)     Point: ADL training (Done)     Description:   Instruct learner(s) on proper safety adaptation and remediation techniques during self care or transfers.   Instruct in proper use of assistive devices.              Learning Progress Summary           Patient Acceptance, E,D, VU,NR by  at 4/11/2021 [id number]                   Point: Home exercise program (Not Started)     Description:   Instruct  learner(s) on appropriate technique for monitoring, assisting and/or progressing therapeutic exercises/activities.              Learner Progress:  Not documented in this visit.          Point: Precautions (Not Started)     Description:   Instruct learner(s) on prescribed precautions during self-care and functional transfers.              Learner Progress:  Not documented in this visit.          Point: Body mechanics (Not Started)     Description:   Instruct learner(s) on proper positioning and spine alignment during self-care, functional mobility activities and/or exercises.              Learner Progress:  Not documented in this visit.                      User Key     Initials Effective Dates Name Provider Type Discipline     07/05/20 -  Gala Boyer, OTR/L Occupational Therapist OT                  OT Recommendation and Plan     Progress Toward Functional Goals (OT): progress toward functional goals is fair  Plan of Care Review  Plan of Care Reviewed With: patient  Progress: improving  Outcome Summary: OT txt completed. Pt awake and alert in [place], brother present and pt is good spirits and calm. Initially Jairo to roll and initiate coming to EOB, however once legs off side of bed and initiating sitting up pt abruptly stops and begins pushing back into sidelying. Requires assist x2 to come to EOB and max vc for processing. Demos L lateral and posterior lean in sitting requiring Jairo progressing to SBA for sitting balance. Performed 1x10 reps shoulder flex, 2x20 reps crossing midline/ipsilateral reaching to address dynamic balance and core strength. Performed oral hygiene, face washing, and combed hair with set up and SBA. Assisted to stand min-modx2 with PTA and fxl mobility in [place]. Requires seated rest to return to room 2' increased weakness, cues needed to initiate RB. Cont OT POC. Recomend d/c SNF.  Plan of Care Reviewed With: patient  Outcome Summary: OT txt completed. [facility]  [facility] and pt is good spirits and calm. Initially Jairo to roll and initiate coming to EOB, however once legs off side of bed and initiating sitting up pt abruptly stops and begins pushing back into sidelying. Requires assist x2 to come to EOB and max vc for processing. Demos L lateral and posterior lean in sitting requiring Jairo progressing to SBA for sitting balance. Performed 1x10 reps shoulder flex, 2x20 reps crossing midline/ipsilateral reaching to address dynamic balance and core strength. Performed oral hygiene, face washing, and combed hair with set up and SBA. Assisted to stand min-modx2 with PTA and fxl mobility in [place]. Requires seated rest to return to room 2' increased weakness, cues needed to initiate RB. Cont OT POC. Recomend d/c SNF.        Time Calculation:   Time Calculation- OT     Row Name 04/13/21 0914             Time Calculation- OT    OT Start Time  0832  -MW      OT Stop Time  0905  -MW      OT Time Calculation (min)  33 min  -MW      Total Timed Code Minutes- OT  33 minute(s)  -MW      OT Received On  04/13/21  -MW        User Key  (r) = Recorded By, (t) = Taken By, (c) = Cosigned By    Initials Name Provider Type    MW Gisele Mendiola, OTR/L Occupational Therapist        Therapy Charges for Today     Code Description Service Date Service Provider Modifiers Qty    83626236401 HC OT SELF CARE/MGMT/TRAIN EA 15 MIN 4/12/2021 Gisele Mendiola, OTR/L GO 3    19675567887 HC OT SELF CARE/MGMT/TRAIN EA 15 MIN 4/13/2021 Gisele Mendiola, OTR/L GO 1    86403405835 HC OT THER PROC EA 15 MIN 4/13/2021 Gisele Mendiola, OTR/L GO 1               Gisele Mendiola OTR/L  4/13/2021

## 2021-04-13 NOTE — PROGRESS NOTES
[facility] Medicine Services  INPATIENT PROGRESS NOTE    Length of Stay: 2  Date of Admission: 4/10/2021  Primary Care Physician: Johnson Phan MD    Subjective   Chief Complaint: Follow-up  HPI   Patient resting in bed.  Daughter and brother are at bedside.  He thinks he is in the hospital at [place] this morning, but is otherwise oriented.  Nursing reports he did have a bowel movement this morning, no report of diarrhea.  He does complain of lower back pain, rates this 5/10.  He states his legs are a little tingly this morning.  He denies any chest pain or shortness of breath.    Review of Systems   All pertinent negatives and positives are as above. All other systems have been reviewed and are negative unless otherwise stated.     Objective    Temp:  [97.5 °F (36.4 °C)-98.5 °F (36.9 °C)] 98 °F (36.7 °C)  Heart Rate:  [78-82] 80  Resp:  [17-18] 18  BP: (134-170)/(74-89) 147/75  Physical Exam  Vitals and nursing note reviewed.   Constitutional:       General: He is not in acute distress.     Appearance: Normal appearance. He is not ill-appearing.   HENT:      Head: Normocephalic and atraumatic.   Cardiovascular:      Rate and Rhythm: Normal rate and regular rhythm.      Pulses: Normal pulses.      Heart sounds: Normal heart sounds.   Pulmonary:      Effort: Pulmonary effort is normal.      Breath sounds: Normal breath sounds. No wheezing, rhonchi or rales.   Abdominal:      General: Bowel sounds are normal. There is no distension.      Palpations: Abdomen is soft.      Tenderness: There is abdominal tenderness.   Musculoskeletal:         General: No swelling or tenderness. Normal range of motion.      Cervical back: Normal range of motion and neck supple. No tenderness.   Skin:     General: Skin is warm and dry.      Findings: No erythema or rash.      Comments: Slight excoriation to bilateral groin   Neurological:      Mental Status: He is alert and oriented to person and  "time. Knew he was in the hospital, thought he was in [place].      Comments: Right leg weaker than left.  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   Psychiatric:         Mood and Affect: Mood normal.         Behavior: Behavior normal.         Thought Content: Thought content normal.         Judgment: Judgment normal    Results Review:  I have reviewed the labs, radiology results, and diagnostic studies.    Laboratory Data:   Results from last 7 days   Lab Units 04/13/21  0342 04/11/21  0620 04/10/21  1354   WBC 10*3/mm3 6.02 7.48 10.24   HEMOGLOBIN g/dL 12.9* 12.3* 14.0   HEMATOCRIT % 37.6 36.4* 40.8   PLATELETS 10*3/mm3 205 208 226     Results from last 7 days   Lab Units 04/13/21  0342 04/12/21  0438 04/11/21  0619 04/10/21  1354   SODIUM mmol/L 137 140 140 140   POTASSIUM mmol/L 3.9 3.9 3.6 4.3   CHLORIDE mmol/L 98 102 99 101   CO2 mmol/L 26.0 27.0 29.0 27.0   BUN mg/dL 11 9 10 12   CREATININE mg/dL 0.90 0.87 0.90 0.98   CALCIUM mg/dL 9.0 9.0 9.1 9.6   BILIRUBIN mg/dL  --   --   --  0.5   ALK PHOS U/L  --   --   --  118*   ALT (SGPT) U/L  --   --   --  14   AST (SGOT) U/L  --   --   --  13   GLUCOSE mg/dL 158* 158* 130* 214*     I have reviewed the patient current medications.     Assessment/Plan     Active Hospital Problems    Diagnosis    • **Frequent falls    • Inability to perform activities of daily living    • Dysmetria    • UTI (urinary tract infection) due to urinary indwelling catheter (CMS/HCC)    • Generalized weakness    • Ulcer of right foot, limited to breakdown of skin (CMS/HCC)    • Failure to thrive in adult    • Dehydration with poor PO intake, hemoconcentration, tachycardia and dry mucus membranes    • Urinary retention    • Dysphagia    • History of pulmonary embolism      Jan '21 - submassive PE started on DOAC and treated with EKOS catheter-directed thrombolytics  Seemed \"unprovoked\" - but we asked that his PCP perform malignancy screening as outpatient   "   • Paroxysmal atrial fibrillation (CMS/HCC)    • HTN (hypertension), benign    • Controlled type 2 diabetes mellitus without complication, without long-term current use of insulin (CMS/HCC)    • Sensorineural hearing loss (SNHL) of both ears      Plan:  1.  Patient admitted 4/10/2021 secondary to frequent falls and weakness.  2.  CT of the head and cervical spine negative for acute process.  3.  X-ray of the bilateral knees, right elbow, and facial bones on admission showed no acute injury.  4.  The patient was seen in the emergency department on 3/29 following a fall with resultant back pain.  He had an MRI at that time which showed an acute superior endplate fracture at L2, confined to the anterior column with no significant loss of vertebral body height no evidence of retropulsion or cord compression.  Mild multilevel degenerative changes.  Bulging of discs relatively diffusely with no evidence for disc herniation.  No significant canal stenosis noted.  Patient evaluated by neurosurgery.  A CT of the lumbar and thoracic spine was obtained to ensure there was no progression of the L2 compression fracture.  This shows mild progression of deformity and developing disc degeneration/vacuum phenomenon with Schmorl's node-like changes.  CT of the thoracic spine negative for acute changes.  No intervention per neurosurgery at this time, will monitor progress with therapy.  5.  The patient has had difficulty with urinary retention on an outpatient basis, felt to be first noted during his ER evaluation on 3/29.  He has followed up with urology on 4/5.  Voiding trial was attempted in office and unsuccessful.  A Burris catheter was replaced in office.  Patient has been placed on Flomax.  Felt to be possibly secondary to combination of pain medication and constipation.  Continue Burris catheter and Flomax, to follow-up with urology as an outpatient.  6.  Patient had been evaluated in the emergency department on the morning of  4/10 and noted to have a urinary tract infection.  Urinalysis at that time showed positive nitrites, leukocytes, too numerous to count white blood cells, and 4+ bacteria.  He was to be discharged home with [employer]mn[employer].  His urine culture from that visit is now showing E. Coli, which is susceptible to Rocephin.  Blood cultures with no growth thus far.  Continue Rocephin, day 4.  Would plan to treat for at least 7 days given indwelling Burris catheter.  7.  DVT prophylaxis achieved by virtue of home medication Xarelto.  Patient has history of pulmonary embolus diagnosed in January 2021.  8.  Continue PT/OT.  Patient now meets inpatient criteria, and will have a bed available at [facility] on Thursday after 3 night inpatient stay.  9.  Last blood glucoses-205, 158, 147.  Continue Accu-Cheks and sliding scale insulin.  Resume Metformin.  10.  Lab holiday in a.m.    Discharge Planning: I expect the patient to be discharged to SNF in 2 days.    Electronically signed by BRIANNA Carrizales, 4/13/2021, 08:52 CDT.

## 2021-04-13 NOTE — PLAN OF CARE
Goal Outcome Evaluation:  Plan of Care Reviewed With: (P) patient  Progress: (P) improving    Swallowing therapy completed to ensure diet tolerance. Patient was alert, cooperative, and oriented to place, time, and self. Patient was given consistencies of regular solid food and thin liquids. Patient did not exhibit any overt signs or symptoms of aspiration during trials. Patient reports that he has had no issues eating or drinking. Patient's swallow function appears to be at baseline, so he will be discharged from speech therapy services.

## 2021-04-13 NOTE — PLAN OF CARE
Goal Outcome Evaluation:  Plan of Care Reviewed With: patient  Progress: improving  Outcome Summary: Pt sitting EOB w/ OT. Pt has a posterior lean and to the left requiring cues for sitting balance. sit-stand mod x2 w/ posterior lean but once pt able to wt shift and begin amb required min x1 and followed w/ chair pt amb 40'x2 rwx cues for safet pt has decreased step length and slow pace. pt would benefit from snf upon d/c

## 2021-04-13 NOTE — PLAN OF CARE
Goal Outcome Evaluation:  Plan of Care Reviewed With: patient  Progress: no change  Outcome Summary: Pt disoriented to place and situation since around 11:00am. pt very fearful, yelling out for lalo pt spouse and cont to state that we were in his room/home and were trying to hurt him. Pt niece Raeann Jauregui was notified and she came to sit with pt and helped to calm him down ne help give him his meds. as soon as Raeann left his evening pt started calling out for lalo again and has been tearful.

## 2021-04-13 NOTE — PLAN OF CARE
Goal Outcome Evaluation:  Plan of Care Reviewed With: patient  Progress: improving  Outcome Summary: OT txt completed. Pt awake and alert in fowlers, brother present and pt is good spirits and calm. Initially Jairo to roll and initiate coming to EOB, however once legs off side of bed and initiating sitting up pt abruptly stops and begins pushing back into sidelying. Requires assist x2 to come to EOB and max vc for processing. Demos L lateral and posterior lean in sitting requiring Jairo progressing to SBA for sitting balance. Performed 1x10 reps shoulder flex, 2x20 reps crossing midline/ipsilateral reaching to address dynamic balance and core strength. Performed oral hygiene, face washing, and combed hair with set up and SBA. Assisted to stand min-modx2 with PTA and fxl mobility in [place]. Requires seated rest to return to room 2' increased weakness, cues needed to initiate RB. Cont OT POC. Recomend d/c SNF.

## 2021-04-14 NOTE — PLAN OF CARE
G  Problem: Adult Inpatient Plan of Care  Goal: Plan of Care Review  Outcome: Ongoing, Progressing  Flowsheets (Taken 4/14/2021 1505)  Progress: no change  Plan of Care Reviewed With: (RN)   other (see comments)   family  Outcome Summary: Ntn follow up. CCHO diet oral intake insufficient at this time. Family reports pt has had poor appetite and unintentional wt loss. Pt qualifies for malnutrition. Boost Glucose Control BID. Cont to follow for POC.

## 2021-04-14 NOTE — PLAN OF CARE
Goal Outcome Evaluation:        Outcome Summary: Pt oriented to self only.  Pt became agitated when staff provided care, pt would yell and grab at staff. Notified Dr. Reyes.  Wound on left foot cleaned and dressed per wound care instructions. Family at bedside. Pt incontinent BM this shift. Burris in place and cleaning/care provided.  Blood glucose monitored. Will continue to monitor.

## 2021-04-14 NOTE — THERAPY TREATMENT NOTE
Acute Care - Occupational Therapy Treatment Note  [facility]     Patient Name: Dayday Andrews  : 1940  MRN: [id number]  Today's Date: 2021             Admit Date: 4/10/2021       ICD-10-CM ICD-9-CM   1. Generalized weakness  R53.1 780.79   2. Falls frequently  R29.6 V15.88   3. Decreased activities of daily living (ADL)  Z78.9 V49.89   4. Dysphagia, unspecified type  R13.10 787.20   5. Gait abnormality  R26.9 781.2   6. Ulcer of right foot, limited to breakdown of skin (CMS/HCC)  L97.511 707.15     Patient Active Problem List   Diagnosis   • Dysfunction of right eustachian tube   • Allergic rhinitis   • Sensorineural hearing loss (SNHL) of both ears   • Hx of adenomatous colonic polyps   • HTN (hypertension), benign   • Controlled type 2 diabetes mellitus without complication, without long-term current use of insulin (CMS/HCC)   • Paroxysmal atrial fibrillation (CMS/HCC)   • Atrial flutter (CMS/HCC)   • Acute respiratory failure with hypoxia (CMS/HCC)   • History of pulmonary embolism   • Inability to perform activities of daily living   • Dysmetria   • UTI (urinary tract infection) due to urinary indwelling catheter (CMS/HCC)   • Generalized weakness   • Ulcer of right foot, limited to breakdown of skin (CMS/HCC)   • Failure to thrive in adult   • Dehydration with poor PO intake, hemoconcentration, tachycardia and dry mucus membranes   • Urinary retention   • Dysphagia   • Frequent falls     Past Medical History:   Diagnosis Date   • Arthritis    • Diabetes (CMS/HCC)    • High blood pressure    • Hx of colonic polyp    • Sleep apnea      Past Surgical History:   Procedure Laterality Date   • COLONOSCOPY N/A 2018    Procedure: COLONOSCOPY WITH ANESTHESIA;  Surgeon: Lauri Bourne MD;  Location: [facility] ENDOSCOPY;  Service: Gastroenterology   • COLONOSCOPY W/ POLYPECTOMY  2015    3 Tubular adenomas Ileocecal valve and at 80 cm repeat exam in 3 years   • EKOS CATHETER PLACEMENT Bilateral  1/15/2021    Procedure: Ekos catheter placement;  Surgeon: Raj Aldridge MD;  Location:  [facility] CATH INVASIVE LOCATION;  Service: Cardiovascular;  Laterality: Bilateral;   • KNEE SURGERY              OT ASSESSMENT FLOWSHEET (last 12 hours)      OT Evaluation and Treatment     Row Name 04/14/21 1304 04/14/21 0815                OT Time and Intention    Subjective Information  no complaints  -TS  --       Document Type  therapy note (daily note)  -TS  therapy note (daily note)  -TS       Mode of Treatment  occupational therapy  -TS  occupational therapy  -TS       Patient Effort  poor  -TS  --       Comment  lethargic  -TS  very lethargic, given meds last PM   -TS          General Information    Existing Precautions/Restrictions  fall;spinal  -TS  --          Cognition    Personal Safety Interventions  fall prevention program maintained;elopement precautions initiated  -TS  --          Pain Scale: FACES Pre/Post-Treatment    Pain: FACES Scale, Pretreatment  0-->no hurt  -TS  --       Posttreatment Pain Rating  0-->no hurt  -TS  --          Bed Mobility    Bed Mobility  rolling left;rolling right  -TS  --       Rolling Left Gilliam (Bed Mobility)  moderate assist (50% patient effort);maximum assist (25% patient effort)  -TS  --       Rolling Right Gilliam (Bed Mobility)  moderate assist (50% patient effort);maximum assist (25% patient effort)  -TS  --       Assistive Device (Bed Mobility)  bed rails;draw sheet  -TS  --       Comment (Bed Mobility)  once in sidelying pt did assist with maintaining positioining while holding to bed rail  -TS  --          Activities of Daily Living    BADL Assessment/Intervention  upper body dressing;bathing  -TS  --          Bathing Assessment/Intervention    Gilliam Level (Bathing)  upper body;maximum assist (25% patient effort);verbal cues  -TS  --       Position (Bathing)  supine  -TS  --          Upper Body Dressing Assessment/Training    Gilliam Level (Upper  Body Dressing)  don;moderate assist (50% patient effort);maximum assist (25% patient effort)  -TS  --       Position (Upper Body Dressing)  supine  -TS  --       Comment (Upper Body Dressing)  gown  -TS  --          Wound 04/10/21 2231 Left coccyx    Wound - Properties Group Placement Date: 04/10/21  -AD Placement Time: 2231 -AD Present on Hospital Admission: Y  -AD Side: Left  -AD Location: coccyx  -AD    Retired Wound - Properties Group Date first assessed: 04/10/21  -AD Time first assessed: 2231 -AD Present on Hospital Admission: Y  -AD Side: Left  -AD Location: coccyx  -AD       Wound 04/10/21 2236 Left anterior third toe    Wound - Properties Group Placement Date: 04/10/21  -AD Placement Time: 2236 -AD Side: Left  -AD Orientation: anterior  -AD Location: third toe  -AD    Retired Wound - Properties Group Date first assessed: 04/10/21  -AD Time first assessed: 2236 -AD Side: Left  -AD Location: third toe  -AD       Plan of Care Review    Plan of Care Reviewed With  patient  -TS  --       Progress  no change  -TS  --       Outcome Summary  Pt lethargic, awakens sporadically through tx. Pt max/dependent for ADLs currently due to lethargy  -TS  --          Positioning and Restraints    Pre-Treatment Position  in bed  -TS  --       Post Treatment Position  bed  -TS  --       In Bed  side lying left;call light within reach;encouraged to call for assist;side rails up x3;exit alarm on;pillow between legs  -TS  --         User Key  (r) = Recorded By, (t) = Taken By, (c) = Cosigned By    Initials Name Effective Dates    TS Lindsay Clayton COTA/ALCON 08/02/16 -     AD Raquel Kurtz RN 08/02/16 -          Occupational Therapy Education                 Title: PT OT SLP Therapies (In Progress)     Topic: Occupational Therapy (In Progress)     Point: ADL training (Done)     Description:   Instruct learner(s) on proper safety adaptation and remediation techniques during self care or transfers.   Instruct in proper use  of assistive devices.              Learning Progress Summary           Patient Acceptance, E,D, VU,NR by  at 4/11/2021 5994                   Point: Home exercise program (Not Started)     Description:   Instruct learner(s) on appropriate technique for monitoring, assisting and/or progressing therapeutic exercises/activities.              Learner Progress:  Not documented in this visit.          Point: Precautions (Not Started)     Description:   Instruct learner(s) on prescribed precautions during self-care and functional transfers.              Learner Progress:  Not documented in this visit.          Point: Body mechanics (Not Started)     Description:   Instruct learner(s) on proper positioning and spine alignment during self-care, functional mobility activities and/or exercises.              Learner Progress:  Not documented in this visit.                      User Key     Initials Effective Dates Name Provider Type Discipline     07/05/20 -  Gala Boyer, OTR/L Occupational Therapist OT                  OT Recommendation and Plan     Plan of Care Review  Plan of Care Reviewed With: patient  Progress: no change  Outcome Summary: Pt lethargic, awakens sporadically through tx. Pt max/dependent for ADLs currently due to lethargy  Plan of Care Reviewed With: patient  Outcome Summary: Pt lethargic, awakens sporadically through tx. Pt max/dependent for ADLs currently due to lethargy    Outcome Measures     Row Name 04/14/21 1400             How much help from another is currently needed...    Putting on and taking off regular lower body clothing?  2  -TS      Bathing (including washing, rinsing, and drying)  2  -TS      Toileting (which includes using toilet bed pan or urinal)  2  -TS      Putting on and taking off regular upper body clothing  2  -TS      Taking care of personal grooming (such as brushing teeth)  3  -TS      Eating meals  3  -TS      AM-PAC 6 Clicks Score (OT)  14  -TS        User Key  (r) =  Recorded By, (t) = Taken By, (c) = Cosigned By    Initials Name Provider Type     Lindsay Clayton COTA/L Occupational Therapy Assistant          Time Calculation:   Time Calculation- OT     Row Name 04/14/21 1447             Time Calculation- OT    OT Start Time  1256  -TS      OT Stop Time  1334  -TS      OT Time Calculation (min)  38 min  -TS      Total Timed Code Minutes- OT  38 minute(s)  -TS      OT Received On  04/14/21  -TS         Timed Charges    14005 - OT Self Care/Mgmt Minutes  38  -TS        User Key  (r) = Recorded By, (t) = Taken By, (c) = Cosigned By    Initials Name Provider Type     Lindsay Clayton COTA/L Occupational Therapy Assistant        Therapy Charges for Today     Code Description Service Date Service Provider Modifiers Qty    [id number] HC OT SELF CARE/MGMT/TRAIN EA 15 MIN 4/14/2021 Lindsay Clayton COTA/L GO 3               Lindsay OSORIO. JANA Clayton  4/14/2021

## 2021-04-14 NOTE — SIGNIFICANT NOTE
Pt continues to shout and trying to get up OOB to get the animals out of his room. He is seeing squirrels and says there are chipmunks in the ceiling. Dr Rodriguez notified, IM geodon given.  Pt continues to try to get up, even offered the nurse $20 to get him up and take him to [place]. He states he has to go to the hospital there and and promises he will not fall again.   RN stayed at bedside, waiting for Victorino to work.

## 2021-04-14 NOTE — PLAN OF CARE
Goal Outcome Evaluation:        Outcome Summary: (P) Pt disoriented to time, place, and situation. Became agitated and restless throughout the night. Had hallucinations of squirrels in the fridge (in corner by the door) and chipmunks in the attic (ceiling). Administered Geodon IM, pt remained restless agitated. Later administered Ativan IM and Benadryl IM, pt ended up calming down but still had limb movements. Attempted to reorient pt throughout the night but pt remains disoriented. Bed alarm remained on, pt attempted to exit bed multiple times. Wound on toe cleaned and dressing changed. Pt incontinent in bed, all linens changed. Blood glucose monitored.

## 2021-04-14 NOTE — PROGRESS NOTES
[facility]  INPATIENT PROGRESS NOTE    Length of Stay: 3  Date of Admission: 4/10/2021  Primary Care Physician: Johnson Phan MD    Subjective   Chief Complaint: Follow-up  HPI   Patient resting in bed with daughter at bedside.  He will arouse briefly to painful stimuli, visibly agitated when awoken.  He does not provide any meaningful review of systems or converse.  He was given intramuscular Geodon, Ativan, and Benadryl overnight due to agitation.  His daughter indicates he said her name when she entered the room this morning, but has otherwise been asleep.    Review of Systems   Unable to fully assess due to somnolence.    Objective    Temp:  [98.2 °F (36.8 °C)-98.4 °F (36.9 °C)] 98.4 °F (36.9 °C)  Heart Rate:  [79-80] 79  Resp:  [18] 18  BP: (144-147)/(68-76) 144/68  Physical Exam  Vitals and nursing note reviewed.   Constitutional:       General: He is not in acute distress.     Appearance: Normal appearance. He is not ill-appearing.   HENT:      Head: Normocephalic and atraumatic.   Cardiovascular:      Rate and Rhythm: Normal rate and regular rhythm.      Pulses: Normal pulses.      Heart sounds: Normal heart sounds.   Pulmonary:      Effort: Pulmonary effort is normal.      Breath sounds: Normal breath sounds. No wheezing, rhonchi or rales.   Abdominal:      General: Bowel sounds are normal. There is no distension.      Palpations: Abdomen is soft.   Musculoskeletal:         General: No swelling or tenderness. Normal range of motion.      Cervical back: Normal range of motion and neck supple. No tenderness.   Skin:     General: Skin is warm and dry.      Findings: No erythema or rash.      Comments: Slight excoriation to bilateral groin.  Dressing to left foot clean/dry/intact.  Neurological:      Mental Status: He is somnolent, does not converse and is not able to cooperate with exam today.     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   Psychiatric:         Mood and Affect: Mood normal.         Behavior: Behavior normal.         Thought Content: Thought content normal.         Judgment: Judgment normal    Results Review:  I have reviewed the labs, radiology results, and diagnostic studies.    Laboratory Data:   Results from last 7 days   Lab Units 04/13/21  0342 04/11/21  0620 04/10/21  1354   WBC 10*3/mm3 6.02 7.48 10.24   HEMOGLOBIN g/dL 12.9* 12.3* 14.0   HEMATOCRIT % 37.6 36.4* 40.8   PLATELETS 10*3/mm3 205 208 226        Results from last 7 days   Lab Units 04/13/21  0342 04/12/21  0438 04/11/21  0619 04/10/21  1354   SODIUM mmol/L 137 140 140 140   POTASSIUM mmol/L 3.9 3.9 3.6 4.3   CHLORIDE mmol/L 98 102 99 101   CO2 mmol/L 26.0 27.0 29.0 27.0   BUN mg/dL 11 9 10 12   CREATININE mg/dL 0.90 0.87 0.90 0.98   CALCIUM mg/dL 9.0 9.0 9.1 9.6   BILIRUBIN mg/dL  --   --   --  0.5   ALK PHOS U/L  --   --   --  118*   ALT (SGPT) U/L  --   --   --  14   AST (SGOT) U/L  --   --   --  13   GLUCOSE mg/dL 158* 158* 130* 214*     I have reviewed the patient current medications.     Assessment/Plan     Active Hospital Problems    Diagnosis    • **Frequent falls    • Inability to perform activities of daily living    • Dysmetria    • UTI (urinary tract infection) due to urinary indwelling catheter (CMS/HCC)    • Generalized weakness    • Ulcer of right foot, limited to breakdown of skin (CMS/HCC)    • Failure to thrive in adult    • Dehydration with poor PO intake, hemoconcentration, tachycardia and dry mucus membranes    • Urinary retention    • Dysphagia    • History of pulmonary embolism      Jan '21 - submassive PE started on DOAC and treated with EKOS catheter-directed thrombolytics  Seemed \"unprovoked\" - but we asked that his PCP perform malignancy screening as outpatient     • Paroxysmal atrial fibrillation (CMS/HCC)    • HTN (hypertension), benign    • Controlled type 2 diabetes mellitus without complication, without long-term " current use of insulin (CMS/[facility])    • Sensorineural hearing loss (SNHL) of both ears      Plan:  1.  Patient admitted 4/10/2021 secondary to frequent falls and weakness.  2.  CT of the head and cervical spine negative for acute process.  3.  X-ray of the bilateral knees, right elbow, and facial bones on admission showed no acute injury.  4.  The patient was seen in the emergency department on 3/29 following a fall with resultant back pain.  He had an MRI at that time which showed an acute superior endplate fracture at L2, confined to the anterior column with no significant loss of vertebral body height no evidence of retropulsion or cord compression.  Mild multilevel degenerative changes.  Bulging of discs relatively diffusely with no evidence for disc herniation.  No significant canal stenosis noted.  Patient evaluated by neurosurgery.  A CT of the lumbar and thoracic spine was obtained to ensure there was no progression of the L2 compression fracture.  This shows mild progression of deformity and developing disc degeneration/vacuum phenomenon with Schmorl's node-like changes.  CT of the thoracic spine negative for acute changes.  No intervention per neurosurgery at this time, will monitor progress with therapy.  5.  The patient has had difficulty with urinary retention on an outpatient basis, felt to be first noted during his ER evaluation on 3/29.  He has followed up with urology on 4/5.  Voiding trial was attempted in office and unsuccessful.  A Burris catheter was replaced in office.  Patient has been placed on Flomax.  Felt to be possibly secondary to combination of pain medication and constipation.  Continue Burris catheter and Flomax, to follow-up with urology as an outpatient.  6.  Patient had been evaluated in the emergency department on the morning of 4/10 and noted to have a urinary tract infection.  Urinalysis at that time showed positive nitrites, leukocytes, too numerous to count white blood cells, and  4+ bacteria.  He was to be discharged home with Omnicef.  His urine culture from that visit is now showing E. Coli, which is susceptible to cephalosporins.  Converted from Rocephin to Omnicef yesterday, plan for total of 7 days of treatment.  Blood cultures with no growth thus far.  7.  Would like to avoid further sedating medications such as Geodon and Ativan, as he was given last night.  We will add Seroquel nightly as needed.  8.  DVT prophylaxis achieved by virtue of home medication Xarelto.  Patient has history of pulmonary embolus diagnosed in January 2021.  9.  Continue PT/OT as able.  Patient will complete 3 night inpatient stay tonight and does have a bed available at [facility] tomorrow if medically stable.  10.  Last blood glucoses-200, 176, 187.  Continue present regimen.  11.  Wound care APRN has evaluated for left third toe wound.  Orders to clean with normal saline, apply Bactroban and cover with Vaseline gauze and 4 x 4 every 12 hours.  Ambulatory referral to outpatient wound care center.  12.  Lab holiday in a.m.    Discharge Planning: I expect the patient to be discharged to SNF in 1-2 days.    Electronically signed by BRIANNA Carrizales, 4/14/2021, 09:00 CDT.    Discussed with BRIANNA Sales and agree with the assessment, treatment plan, and disposition of the patient as recorded by her.     The main reason for admission was failure to thrive and inability to care for himself any longer in the home setting.  He has had problems with cognition and falls.     Working towards placement at [facility] on Thursday.  This is when he is approved to go.  He had problems with agitation and poor sleep last night.  He was given several medications by my partner overnight including Geodon and Ativan.  We would like to have him off of these medications if at all possible.  Agree with adding scheduled Seroquel tonight.    Agree with finishing treatment for his recent E. coli urinary tract infection with  Omnicef.    Continue efforts at PT/OT.    Electronically signed by Darin Reyes DO, 4/14/2021, 11:37 CDT.

## 2021-04-14 NOTE — NURSING NOTE
Family went home for the night and pt is screaming. RN went to check and he told here there are animals in the refrigerator, pointing at the wall. Pt continued to shout when Rn was in another room.

## 2021-04-14 NOTE — PLAN OF CARE
Goal Outcome Evaluation:  Plan of Care Reviewed With: patient  Progress: no change  Outcome Summary: Pt lethargic, awakens sporadically through tx. Pt max/dependent for ADLs currently due to lethargy

## 2021-04-14 NOTE — NURSING NOTE
Pt has been resting this morning after IM injections last night. Is currently refusing any clothing or blankets. Attempt made to do fuchs cath care but was unable to complete.

## 2021-04-14 NOTE — NURSING NOTE
"Neuro handoff with Trisha. Pt confused and agitated, became more agitated when we tried to put clothes or blanket on to cover his privates. He told us to \"mind your own business.\"  "

## 2021-04-14 NOTE — PLAN OF CARE
Goal Outcome Evaluation:  Plan of Care Reviewed With: patient  Progress: no change  Outcome Summary: Pt cognition not as clear during rx today as yesterdays rx. Bed mobility max 2 pt leaning hard posteriorly and to the left. took a significant amount of time to keep pt from leaning so hard. Attempt to stand x3 pt unable to completely stand up. Pt will need snf upon d/c
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
## 2021-04-15 PROBLEM — E43 SEVERE MALNUTRITION (HCC): Status: ACTIVE | Noted: 2021-01-01

## 2021-04-15 PROBLEM — F03.918 DEMENTIA WITH BEHAVIORAL DISTURBANCE (HCC): Status: ACTIVE | Noted: 2021-01-01

## 2021-04-15 NOTE — PROGRESS NOTES
Continued Stay Note  [facility]     Patient Name: Dayday Andrews  MRN: [id number]  Today's Date: 4/15/2021    Admit Date: 4/10/2021    Discharge Plan     Row Name 04/15/21 0913       Plan    Plan  [facility]    Plan Comments  ANGEL LUIS spoke to America at [facility]. Patient does not need new COVID test to admit to [facility] today. America did need information re medications for agitation and when meds were last given. ANGEL LUIS provided info to America. America is clearing admission to [facility] today with DON and will notify ANGEL LUIS when decision has been made.            Expected Discharge Date and Time     Expected Discharge Date Expected Discharge Time    Apr 15, 2021             [facility]

## 2021-04-15 NOTE — THERAPY DISCHARGE NOTE
Acute Care - Physical Therapy Discharge Summary  [facility]       Patient Name: Dayday Andrews  : 1940  MRN: [id number]    Today's Date: 4/15/2021                 Admit Date: 4/10/2021      PT Recommendation and Plan    Visit Dx:    ICD-10-CM ICD-9-CM   1. Generalized weakness  R53.1 780.79   2. Falls frequently  R29.6 V15.88   3. Decreased activities of daily living (ADL)  Z78.9 V49.89   4. Dysphagia, unspecified type  R13.10 787.20   5. Gait abnormality  R26.9 781.2   6. Ulcer of right foot, limited to breakdown of skin (CMS/HCC)  L97.511 707.15       Outcome Measures     Row Name 21 1400             How much help from another is currently needed...    Putting on and taking off regular lower body clothing?  2  -TS      Bathing (including washing, rinsing, and drying)  2  -TS      Toileting (which includes using toilet bed pan or urinal)  2  -TS      Putting on and taking off regular upper body clothing  2  -TS      Taking care of personal grooming (such as brushing teeth)  3  -TS      Eating meals  3  -TS      AM-PAC 6 Clicks Score (OT)  14  -TS        User Key  (r) = Recorded By, (t) = Taken By, (c) = Cosigned By    Initials Name Provider Type    TS Lindsay Clayton COTA/L Occupational Therapy Assistant              Rehab Goal Summary     Row Name 04/15/21 1300             Bed Mobility Goal 1 (PT)    Activity/Assistive Device (Bed Mobility Goal 1, PT)  rolling to left;rolling to right;scooting;sidelying to sit/sit to sidelying  -NW      [place] Level/Cues Needed (Bed Mobility Goal 1, PT)  contact guard assist  -NW      Time Frame (Bed Mobility Goal 1, PT)  long term goal (LTG);10 days  -NW      Progress/Outcomes (Bed Mobility Goal 1, PT)  goal not met  -NW         Transfer Goal 1 (PT)    Activity/Assistive Device (Transfer Goal 1, PT)  sit-to-stand/stand-to-sit;bed-to-chair/chair-to-bed;walker, rolling  -NW      [place] Level/Cues Needed (Transfer Goal 1, PT)  contact guard assist   -NW      Time Frame (Transfer Goal 1, PT)  long term goal (LTG);10 days  -NW      Progress/Outcome (Transfer Goal 1, PT)  goal not met  -NW         Gait Training Goal 1 (PT)    Activity/Assistive Device (Gait Training Goal 1, PT)  gait (walking locomotion);assistive device use;decrease fall risk;diminish gait deviation;improve balance and speed;increase endurance/gait distance;walker, rolling  -NW      [place] Level (Gait Training Goal 1, PT)  contact guard assist  -NW      Distance (Gait Training Goal 1, PT)  40-50 ft  -NW      Time Frame (Gait Training Goal 1, PT)  long term goal (LTG);10 days  -NW      Progress/Outcome (Gait Training Goal 1, PT)  goal not met  -NW         Patient Education Goal (PT)    Activity (Patient Education Goal, PT)  demonstrate knowledge and verbalize spinal precautions  -NW      [place]/Cues/Accuracy (Memory Goal 2, PT)  demonstrates adequately;independent;verbalizes understanding  -NW      Time Frame (Patient Education Goal, PT)  long term goal (LTG);10 days  -NW      Progress/Outcome (Patient Education Goal, PT)  goal not met  -NW        User Key  (r) = Recorded By, (t) = Taken By, (c) = Cosigned By    Initials Name Provider Type Discipline    NW Estrellita Kerns PTA Physical Therapy Assistant PT          Therapy Charges for Today     Code Description Service Date Service Provider Modifiers Qty    98568568742  PT THERAPEUTIC ACT EA 15 MIN 4/14/2021 Estrellita Kerns PTA GP 2          PT Discharge Summary  Anticipated Discharge Disposition (PT): skilled nursing facility  Reason for Discharge: Discharge from facility  Outcomes Achieved: Refer to plan of care for updates on goals achieved  Discharge Destination: SNF      Estrellita Kerns PTA   4/15/2021

## 2021-04-15 NOTE — PLAN OF CARE
Goal Outcome Evaluation:  Plan of Care Reviewed With: (P) patient  Progress: (P) no change  Outcome Summary: (P) Pt disoriented x4 majority of night. Administered seroquel with night time meds, crushed in applesauce. Since taking seroquel pt has been drowsy, sleeping on and off. Pt tolerated taking meds well. Pt resistant to nurse at times, but not aggressive or shouting out like he was before. He allowed catheter care to be performed. Pt was repositioned and took tylenol for back discomfort, no further complaints. Will continue to reorient and monitor pt.

## 2021-04-15 NOTE — PLAN OF CARE
Goal Outcome Evaluation:  Plan of Care Reviewed With: patient, family  Progress: improving  Outcome Summary: Patient disoriented x3, alert to self. Medications tolerated in applesauce. Patient to discharge today, report called to [facility] spoke with SUZETTE Green about patient. [place] care accomplished today. Wound care to Left 2nd and 3rd toe completed, new dressing applied. blood sugars mointored, treated per protocol; see MAR. EMS transport to take patient to facility. niece and brother at bedside, updated on care.

## 2021-04-15 NOTE — PROGRESS NOTES
Continued Stay Note  [facility]     Patient Name: Dayday Andrews  MRN: [id number]  Today's Date: 4/15/2021    Admit Date: 4/10/2021    Discharge Plan     Row Name 04/15/21 1006       Plan    Final Discharge Disposition Code  03 - skilled nursing facility (SNF)    Final Note  [facility] at [facility] has notified  that patient is accepted for admission today. Discharge summary to be faxed to [facility] at [phone]. Patient's nurse will call report to [phone].    Row Name 04/15/21 2280       Plan    Plan  [facility]    Plan Comments  SW spoke to America at [facility]. Patient does not need new COVID test to admit to [facility] today. [facility] did need information re medications for agitation and when meds were last given. ANGEL LUIS provided info to [facility]. [facility] is clearing admission to [facility] today with DON and will notify ANGEL LUIS when decision has been made.            Expected Discharge Date and Time     Expected Discharge Date Expected Discharge Time    Apr 15, 2021             CHARLY ChaseW

## 2021-04-15 NOTE — DISCHARGE SUMMARY
"    [facility] Medicine Services  DISCHARGE SUMMARY       Date of Admission: 4/10/2021  Date of Discharge:  4/15/2021  Primary Care Physician: Johnson Phan MD    Presenting Problem/History of Present Illness:  Frequent falls.  Inability to care for self.    Final Discharge Diagnoses:  Active Hospital Problems    Diagnosis    • **Frequent falls    • Severe malnutrition (CMS/HCC)    • Inability to perform activities of daily living    • Dysmetria    • UTI (urinary tract infection) due to urinary indwelling catheter (CMS/HCC)    • Generalized weakness    • Ulcer of right foot, limited to breakdown of skin (CMS/HCC)    • Failure to thrive in adult    • Dehydration with poor PO intake, hemoconcentration, tachycardia and dry mucus membranes    • Urinary retention    • Dysphagia    • History of pulmonary embolism      Jan '21 - submassive PE started on DOAC and treated with EKOS catheter-directed thrombolytics  Seemed \"unprovoked\" - but we asked that his PCP perform malignancy screening as outpatient     • Paroxysmal atrial fibrillation (CMS/HCC)    • HTN (hypertension), benign    • Controlled type 2 diabetes mellitus without complication, without long-term current use of insulin (CMS/HCC)    • Sensorineural hearing loss (SNHL) of both ears      Consults:   1.  Neurosurgery  2.  Wound care    Procedures Performed: None    Pertinent Test Results:   Lab Results (all)     Procedure Component Value Units Date/Time    POC Glucose Once [[id number]]  (Abnormal) Collected: 04/15/21 0801    Specimen: Blood Updated: 04/15/21 0812     Glucose 155 mg/dL      Comment: : [id number] Stephen Raines ID: [id number]       POC Glucose Once [[id number]]  (Normal) Collected: 04/14/21 1714    Specimen: Blood Updated: 04/14/21 1725     Glucose 120 mg/dL      Comment: : [id number] Turner Trisha SMeter ID: [id number]       Blood Culture - Blood, Arm, Left [[id number]] Collected: 04/10/21 1355    " Specimen: Blood from Arm, Left Updated: 04/14/21 1415     Blood Culture No growth at 4 days    Blood Culture - Blood, Arm, Right [[id number]] Collected: 04/10/21 1355    Specimen: Blood from Arm, Right Updated: 04/14/21 1415     Blood Culture No growth at 4 days    Basic Metabolic Panel [[id number]]  (Abnormal) Collected: 04/13/21 0342    Specimen: Blood Updated: 04/13/21 0414     Glucose 158 mg/dL      BUN 11 mg/dL      Creatinine 0.90 mg/dL      Sodium 137 mmol/L      Potassium 3.9 mmol/L      Chloride 98 mmol/L      CO2 26.0 mmol/L      Calcium 9.0 mg/dL      eGFR Non African Amer 82 mL/min/1.73      BUN/Creatinine Ratio 12.2     Anion Gap 13.0 mmol/L     CBC (No Diff) [[id number]]  (Abnormal) Collected: 04/13/21 0342    Specimen: Blood Updated: 04/13/21 0354     WBC 6.02 10*3/mm3      RBC 4.44 10*6/mm3      Hemoglobin 12.9 g/dL      Hematocrit 37.6 %      MCV 84.7 fL      MCH 29.1 pg      MCHC 34.3 g/dL      RDW 14.0 %      RDW-SD 43.3 fl      MPV 9.2 fL      Platelets 205 10*3/mm3     POC Glucose Once [[id number]]  (Abnormal) Collected: 04/12/21 2118    Specimen: Blood Updated: 04/12/21 2129     Glucose 205 mg/dL      Comment: : [id number] Mary LoriMeter ID: [id number]       Basic Metabolic Panel [[id number]]  (Abnormal) Collected: 04/12/21 0438    Specimen: Blood Updated: 04/12/21 0533     Glucose 158 mg/dL      BUN 9 mg/dL      Creatinine 0.87 mg/dL      Sodium 140 mmol/L      Potassium 3.9 mmol/L      Chloride 102 mmol/L      CO2 27.0 mmol/L      Calcium 9.0 mg/dL      eGFR Non African Amer 85 mL/min/1.73      BUN/Creatinine Ratio 10.3     Anion Gap 11.0 mmol/L     Magnesium [[id number]]  (Normal) Collected: 04/12/21 0438    Specimen: Blood Updated: 04/12/21 0533     Magnesium 2.1 mg/dL     POC Glucose Once [[id number]]  (Abnormal) Collected: 04/11/21 2030    Specimen: Blood Updated: 04/11/21 2050     Glucose 158 mg/dL      Comment: : [id number] Lila Gerard (Drury) AnnMeter ID: [id number]       POC  Glucose Once [[id number]]  (Abnormal) Collected: 04/11/21 1635    Specimen: Blood Updated: 04/11/21 1650     Glucose 159 mg/dL      Comment: : [id number] Edward AmandaMeter ID: [id number]       POC Glucose Once [[id number]]  (Abnormal) Collected: 04/11/21 1138    Specimen: Blood Updated: 04/11/21 1150     Glucose 169 mg/dL      Comment: : [id number] Edward AmandaMeter ID: [id number]       Urine Culture - Urine, Urine, Clean Catch [[id number]]  (Normal) Collected: 04/10/21 1443    Specimen: Urine, Clean Catch Updated: 04/11/21 1135     Urine Culture No growth    POC Glucose Once [[id number]]  (Normal) Collected: 04/11/21 0825    Specimen: Blood Updated: 04/11/21 0838     Glucose 116 mg/dL      Comment: : [id number] Edward AmandaMeter ID: [id number]       TSH [[id number]]  (Normal) Collected: 04/11/21 0619    Specimen: Blood Updated: 04/11/21 0711     TSH 0.865 uIU/mL     Basic Metabolic Panel [[id number]]  (Abnormal) Collected: 04/11/21 0619    Specimen: Blood Updated: 04/11/21 0711     Glucose 130 mg/dL      BUN 10 mg/dL      Creatinine 0.90 mg/dL      Sodium 140 mmol/L      Potassium 3.6 mmol/L      Chloride 99 mmol/L      CO2 29.0 mmol/L      Calcium 9.1 mg/dL      eGFR Non African Amer 82 mL/min/1.73      BUN/Creatinine Ratio 11.1     Anion Gap 12.0 mmol/L     Phosphorus [[id number]]  (Normal) Collected: 04/11/21 0619    Specimen: Blood Updated: 04/11/21 0708     Phosphorus 3.8 mg/dL     Hemoglobin A1c [[id number]]  (Abnormal) Collected: 04/11/21 0620    Specimen: Blood Updated: 04/11/21 0708     Hemoglobin A1C 8.30 %     Magnesium [[id number]]  (Normal) Collected: 04/11/21 0619    Specimen: Blood Updated: 04/11/21 0706     Magnesium 2.1 mg/dL     CBC Auto Differential [[id number]]  (Abnormal) Collected: 04/11/21 0620    Specimen: Blood Updated: 04/11/21 0646     WBC 7.48 10*3/mm3      RBC 4.20 10*6/mm3      Hemoglobin 12.3 g/dL      Hematocrit 36.4 %      MCV 86.7 fL      MCH 29.3 pg      MCHC 33.8 g/dL       RDW 14.0 %      RDW-SD 44.2 fl      MPV 9.0 fL      Platelets 208 10*3/mm3      Neutrophil % 71.3 %      Lymphocyte % 20.1 %      Monocyte % 6.7 %      Eosinophil % 0.8 %      Basophil % 0.7 %      Immature Grans % 0.4 %      Neutrophils, Absolute 5.34 10*3/mm3      Lymphocytes, Absolute 1.50 10*3/mm3      Monocytes, Absolute 0.50 10*3/mm3      Eosinophils, Absolute 0.06 10*3/mm3      Basophils, Absolute 0.05 10*3/mm3      Immature Grans, Absolute 0.03 10*3/mm3      nRBC 0.0 /100 WBC     POC Glucose Once [[id number]]  (Abnormal) Collected: 04/10/21 2103    Specimen: Blood Updated: 04/10/21 2133     Glucose 155 mg/dL      Comment: : [id number] Lila Gerard (Drury) AnnMeter ID: [id number]       Timed Lactic Acid, Reflex [[id number]]  (Normal) Collected: 04/10/21 1857    Specimen: Blood Updated: 04/10/21 1931     Lactate 1.3 mmol/L     CK [[id number]]  (Normal) Collected: 04/10/21 1354    Specimen: Blood Updated: 04/10/21 1825     Creatine Kinase 44 U/L     Urinalysis With Culture If Indicated - Urine, Clean Catch [[id number]]  (Abnormal) Collected: 04/10/21 1443    Specimen: Urine, Clean Catch Updated: 04/10/21 1533     Color, UA Yellow     Appearance, UA Clear     pH, UA 7.5     Specific Gravity, UA 1.024     Glucose, UA Negative     Ketones, UA 15 mg/dL (1+)     Bilirubin, UA Negative     Blood, UA Large (3+)     Protein, UA 30 mg/dL (1+)     Leuk Esterase, UA Moderate (2+)     Nitrite, UA Positive     Urobilinogen, UA 2.0 E.U./dL    Urinalysis, Microscopic Only - Urine, Clean Catch [[id number]]  (Abnormal) Collected: 04/10/21 1443    Specimen: Urine, Clean Catch Updated: 04/10/21 1533     RBC, UA 6-12 /HPF      WBC, UA 21-30 /HPF      Bacteria, UA None Seen /HPF      Squamous Epithelial Cells, UA 3-6 /HPF      Hyaline Casts, UA None Seen /LPF      Methodology Manual Light Microscopy    COVID-19,[facility] IN-HOUSE,Nasal Swab No Transport Media 3-4 HR TAT - Swab, Nasal Cavity [[id number]]  (Normal) Collected:  04/10/21 1355    Specimen: Swab from Nasal Cavity Updated: 04/10/21 1439     COVID19 Not Detected    Narrative:      BNP [[id number]]  (Normal) Collected: 04/10/21 1354    Specimen: Blood Updated: 04/10/21 1438     proBNP 476.3 pg/mL     Narrative:      Lactic Acid, Plasma [[id number]]  (Abnormal) Collected: 04/10/21 1354    Specimen: Blood Updated: 04/10/21 1437     Lactate 2.1 mmol/L     Procalcitonin [[id number]]  (Normal) Collected: 04/10/21 1354    Specimen: Blood Updated: 04/10/21 1433     Procalcitonin 0.11 ng/mL     Narrative:      Comprehensive Metabolic Panel [[id number]]  (Abnormal) Collected: 04/10/21 1354    Specimen: Blood Updated: 04/10/21 1428     Glucose 214 mg/dL      BUN 12 mg/dL      Creatinine 0.98 mg/dL      Sodium 140 mmol/L      Potassium 4.3 mmol/L      Chloride 101 mmol/L      CO2 27.0 mmol/L      Calcium 9.6 mg/dL      Total Protein 6.6 g/dL      Albumin 3.60 g/dL      ALT (SGPT) 14 U/L      AST (SGOT) 13 U/L      Alkaline Phosphatase 118 U/L      Total Bilirubin 0.5 mg/dL      eGFR Non African Amer 74 mL/min/1.73      Globulin 3.0 gm/dL      A/G Ratio 1.2 g/dL      BUN/Creatinine Ratio 12.2     Anion Gap 12.0 mmol/L     aPTT [[id number]]  (Abnormal) Collected: 04/10/21 1355    Specimen: Blood Updated: 04/10/21 1417     PTT 42.8 seconds     Protime-INR [[id number]]  (Abnormal) Collected: 04/10/21 1355    Specimen: Blood Updated: 04/10/21 1417     Protime 15.8 Seconds      INR 1.30    CBC Auto Differential [[id number]]  (Abnormal) Collected: 04/10/21 1354    Specimen: Blood Updated: 04/10/21 1409     WBC 10.24 10*3/mm3      RBC 4.78 10*6/mm3      Hemoglobin 14.0 g/dL      Hematocrit 40.8 %      MCV 85.4 fL      MCH 29.3 pg      MCHC 34.3 g/dL      RDW 14.1 %      RDW-SD 43.8 fl      MPV 9.4 fL      Platelets 226 10*3/mm3      Neutrophil % 80.8 %      Lymphocyte % 12.7 %      Monocyte % 5.3 %      Eosinophil % 0.3 %      Basophil % 0.4 %      Immature Grans % 0.5 %      Neutrophils, Absolute 8.28  10*3/mm3      Lymphocytes, Absolute 1.30 10*3/mm3      Monocytes, Absolute 0.54 10*3/mm3      Eosinophils, Absolute 0.03 10*3/mm3      Basophils, Absolute 0.04 10*3/mm3      Immature Grans, Absolute 0.05 10*3/mm3      nRBC 0.0 /100 WBC         Imaging Results (All)     Procedure Component Value Units Date/Time    CT Thoracic Spine Without Contrast [[id number]] Collected: 04/11/21 1642     Updated: 04/11/21 1651    Narrative:      EXAMINATION:  CT THORACIC SPINE WO CONTRAST-  4/11/2021 3:31 PM CDT     HISTORY: Back trauma (Ped 0-15y); R53.1-Weakness; R29.6-Repeated falls;  Z78.9-Other specified health status; R13.10-Dysphagia, unspecified      COMPARISON: No comparison study.     TECHNIQUE: Radiation dose equals  mGy-cm.  Automated exposure  control dose reduction technique was implemented.     Thin section axial imaging was obtained without intravenous contrast.  2-D sagittal and coronal reconstruction images were generated.     FINDINGS:      There is kyphosis of the mid thoracic spine with mild right lateral  deviation, there is mild roto-scoliosis.     There is diffuse spondylosis changes with anterior and lateral bridging  osteophyte formation.     The facets are appropriately aligned.     Vertebral body heights are maintained without fracture or subluxation.     There is no CT evidence of disc herniation or significant canal  stenosis.     There is relative mild foraminal narrowing associated with the  rotoscoliotic change T6-T7 through T9-T10.       Impression:      1. No CT evidence of acute bony injury to the thoracic spine.  2. Rotoscoliotic change with diffuse spondylosis.  This report was finalized on 04/11/2021 16:47 by Dr. Josué Patterson MD.    CT Lumbar Spine Without Contrast [[id number]] Collected: 04/11/21 1615     Updated: 04/11/21 1628    Narrative:      EXAMINATION:  CT LUMBAR SPINE WO CONTRAST-  4/11/2021 3:31 PM CDT     HISTORY: Low back pain, trauma; R53.1-Weakness; R29.6-Repeated  falls;  Z78.9-Other specified health status; R13.10-Dysphagia, unspecified      COMPARISON: 3/29/2021 lumbar spine CT imaging     TECHNIQUE:      Radiation dose equals  mGy-cm.  Automated exposure control dose  reduction technique was implemented.     Thin section axial imaging was obtained without intravenous contrast.  2-D sagittal and coronal reconstruction images were generated.     FINDINGS:      There is progression of endplate depression deformity of the superior  plate of L2. An estimated 20% loss vertebral body height observed  particularly centrally. There is developing vacuum phenomenon with  Schmorl's-like changes. The fracture line noted on the previous  examination is less conspicuous.     There is no retropulsion associated with posterior element involvement  with fracture.     There is no CT evidence of additional acute or subacute fractures.     There is bilateral spondylolysis of L5 with mild grade 1 spinal  listhesis of L5 on S1 that appears stable.     There are spondylosis changes diffusely in the lumbar spine with mild  anterior osteophyte formation and mild Schmorl's node change.     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     There is broad-based disc bulging at L5-S1 and minimally at L4-L5. There  is no significant canal stenosis.     There is relative foraminal narrowing at L5-S1, L4-L5 and to lesser  L3-L4.     There is relative foraminal stenosis       Impression:      1. Subacute superior endplate fracture of L2 with mild progression of  superior endplate deformity and developing disc degeneration/vacuum  phenomenon with Schmorl's node-like changes.  2. No CT evidence of acute bony injury to the lumbar spine.  3. 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  4. Disc bulging L5-S1 and L4-L5 without significant canal stenosis  5. Foraminal narrowing L5-S1  and L4-L5 and to lesser degree L3-L4.  6.Baastrup's disease L4-L5 and L3-L4.  This report was finalized on 04/11/2021 16:25 by Dr. Josué Patterson MD.    MRI Brain Without Contrast [[id number]] Collected: 04/11/21 1005     Updated: 04/11/21 1012    Narrative:      EXAMINATION:  MRI BRAIN WO CONTRAST-  4/11/2021 9:02 AM CDT     HISTORY: Neuro deficit, acute, stroke suspected      COMPARISON: Head CT dated 4/10/2021     TECHNIQUE: MR imaging the brain was performed.     FINDINGS:      Significant image quality degradation related to patient motion.  Multiple sequences were repeated.     There is no diffusion signal abnormality to indicate an acute ischemic  event/area of infarction.     There is central and cortical atrophy.     There are punctate and confluent periventricular and subcortical FLAIR  signal white matter hyperintensities that are nonspecific, chronic  ischemic changes most likely.     There is no definite abnormal intra-axial extra-axial blood products  identified.  No definite flow void appreciated, suboptimally imaged on the gradient  echo imaging sequences.     There is no mass effect or midline shift.     There is no hydrocephalus.     The globes and intraorbital structures are imaged symmetrically.     Pituitary gland is not enlarged.     Cervical spine imaged in part is unremarkable.       Impression:      1. No MR evidence of an acute intracranial process.  2. Atrophy.  3. Probable chronic ischemic white matter changes.  This report was finalized on 04/11/2021 10:09 by Dr. Josué Patterson MD.    XR Chest 1 View [[id number]] Collected: 04/10/21 1641     Updated: 04/10/21 1645    Narrative:      EXAMINATION: XR CHEST 1 VW-. 4/10/2021 4:41 PM CDT     CHEST, ONE VIEW:     HISTORY: Pain post fall     COMPARISON: 3/29/2021 and 1/15/2021 chest radiography     A single frontal chest radiograph was obtained.     FINDINGS:     The level of inspiration is relatively shallow and lung volumes  diminished.     The  lungs are clear without infiltrates. There are no pleural effusions  or pneumothoraces.     The heart is normal in size, pulmonary circulation appropriate, without  heart failure.     The bony structures are intact without acute osseous abnormality.                                     Impression:      1. No acute cardiopulmonary process.     This report was finalized on 04/10/2021 16:42 by Dr. Josué Patterson MD.    XR Knee 1 or 2 View Bilateral [[id number]] Collected: 04/10/21 1640     Updated: 04/10/21 1644    Narrative:      EXAMINATION:  XR KNEE 1 OR 2 VW BILATERAL-  4/10/2021 2:45 PM CDT     HISTORY: fall pain     COMPARISON: No comparison study.     TECHNIQUE: 4 views: AP lateral projection imaging of both knees were  obtained.     FINDINGS:      Patella femoral and tibial relationships are appropriate without  fracture.     Osteoarthritic changes identified in both knees particularly the  patellofemoral and medial joint space compartments, greater on the  right.     Small joint effusion suspected in the suprapatellar regions bilaterally.     Arterial calcifications noted posteriorly       Impression:      1. No acute osseous abnormality.  2. Small joint effusions.  3. Degenerative changes.  This report was finalized on 04/10/2021 16:41 by Dr. Josué Patterson MD.    XR Elbow 3+ View Right [[id number]] Collected: 04/10/21 1639     Updated: 04/10/21 1643    Narrative:      EXAMINATION:  XR ELBOW 3+ VW RIGHT-  4/10/2021 2:47 PM CDT     HISTORY: fall pain     COMPARISON: No comparison study.     TECHNIQUE: 3 views: AP lateral oblique projection imaging     FINDINGS:      The bony structures are intact. The joint spaces are maintained.     There is no fracture or dislocation.     Degenerative changes identified with osteophyte formation along the  ulnar humeral joint coronoid spurring. Spurring also noted along the  radial head neck junction.     No significant joint effusion identified.       Impression:      1. No  acute osseous abnormality.  2. Degenerative changes.  This report was finalized on 04/10/2021 16:40 by Dr. Josué Patterson MD.    CT Cervical Spine Without Contrast [[id number]] Collected: 04/10/21 1444     Updated: 04/10/21 1455    Narrative:      EXAMINATION:  CT CERVICAL SPINE WO CONTRAST-  4/10/2021 2:15 PM CDT     HISTORY: fall onto face. Neck pain     COMPARISON: No comparison study.     TECHNIQUE: Radiation dose equals  mGy-cm.  Automated exposure  control dose reduction technique was implemented.     Thin section axial imaging was obtained without intravenous contrast.  2-D sagittal and coronal reconstruction images were generated.     FINDINGS:      Facets are appropriately aligned with hypertrophic changes.     There is spondylosis changes with ridging anterior osteophyte and  uncinate hypertrophy with mild disc space narrowing C5-C6 and to a  lesser degree C4-C5 and C6-C7.     Vertebral body heights are maintained. The prevertebral soft tissues are  normal.     There is no fracture or subluxation.     There is mild posterior osteophyte and disc complexes at C5-C6, C6-C7  and C4-C5 without significant canal stenosis. There is multilevel  foraminal narrowing particularly at C3-C4, C4-C5 and C5-C6.     There is no CT evidence of significant posttraumatic disc herniation.      Carotid artery calcifications identified.       Impression:      1. No CT evidence of acute bony injury to the cervical spine.  2. Multilevel disc degeneration and spondylosis.  This report was finalized on 04/10/2021 14:52 by Dr. Josué Patterson MD.    CT Head Without Contrast [[id number]] Collected: 04/10/21 1441     Updated: 04/10/21 1446    Narrative:      EXAMINATION: CT HEAD WO CONTRAST-  4/10/2021 2:41 PM CDT     CT SCAN OF THE HEAD, WITHOUT CONTRAST:      HISTORY: Head pain post fall/head trauma     COMPARISONS: 3/29/2021 head CT      TECHNIQUE:     Radiation dose equals  mGy-cm.  Automated exposure control  dose  reduction technique was implemented.        CT evaluation of the head without intravenous contrast. 5 mm transaxial  images were obtained.   2-D sagittal and coronal reconstruction images  were generated.     FINDINGS:      There is no intra or extra-axial hemorrhage.     There is no mass, mass effect or midline shift.     There is no developing hydrocephalus.     There is central and cortical atrophy.     Low-attenuation in the periventricular subcortical white matter observed  compatible with mild chronic ischemic changes.     There is no skull fracture or acute calvarial abnormality.     Paranasal sinuses imaged incompletely are clear.     CT appearance of the head is unchanged.             Impression:      1. No CT evidence of an acute intracranial process.                              This report was finalized on 04/10/2021 14:43 by Dr. Josué Patterson MD.        Hospital Course:  Mr. Andrews is a 80-year-old  male who follows Dr. Johnson Phan for primary care.  He has a medical history significant for non-insulin-dependent diabetes mellitus type 2, hypertension, and dementia.  Recently, the patient has been suffering from frequent falls and suffered a fall with an acute L2 fracture on 3/29-seen in the emergency department.  At that time, he also was noted to have urinary retention and continues to have difficulty with and has been seeing urology on an outpatient basis.  The patient presented to the [facility] emergency department on 4/10/2021 with continued difficulty with ambulation and frequent falls.  In the emergency department, the patient's urinalysis showed positive nitrites, leuk esterase, and 21-30 white blood cells.  Chest x-ray showed no acute cardiopulmonary process.  Bilateral knee x-rays and right elbow x-ray showed no abnormalities.  CT of the head was negative for acute process.  The patient was admitted to the hospitalist service for further evaluation and  "management.    MRI of the brain was performed given lower extremity weakness and also difficulty with dysphagia.  This showed no evidence of acute intracranial process.  The patient was evaluated by neurosurgery given recent L2 fracture.  A CT of the lumbar and thoracic spine was obtained to ensure there was no progression of the L2 compression fracture.  This shows mild progression of deformity and developing disc degeneration/vacuum phenomenon with Schmorl's node-like changes.  CT of the thoracic spine negative for acute changes.  No intervention per neurosurgery at this time.  Did discuss with BRIANNA Britton this morning given that patient is complaining of some back pain.  LSO brace will be fitted per physical therapy.    Again, the patient has had difficulty with urinary retention on an outpatient basis, first noted during an ER evaluation on 3/29.  He followed up with urology on 4/5.  A voiding trial was attempted in the office and was unsuccessful and Burris catheter was placed.  He has been placed on Flomax.  Burris catheter and Flomax have been continued, and feel he should follow-up with urology on an outpatient basis.  The patient's urine culture has shown growth of E. coli, which is susceptible to cephalosporins.  He had been placed on Rocephin on admission, and has been converted to oral Omnicef and will continue to total 7 days of appropriate treatment.    The patient has been evaluated by physical and Occupational Therapy and is not felt safe to return home to care for himself.  [place] [facility] [place] [facility] [place] in overall stable condition to discharge to their facility today.    Condition on Discharge:  Medically stable    Physical Exam on Discharge:  /70 (BP Location: Left arm, Patient Position: Lying)   Pulse 88   Temp 97.1 °F (36.2 °C) (Oral)   Resp 16   Ht 185.4 cm (73\")   Wt 102 kg (225 lb 12.8 oz)   SpO2 99%   BMI 29.79 kg/m²   Physical " Exam  Vitals and nursing note reviewed.   Constitutional:       General: He is not in acute distress.     Appearance: Normal appearance. He is not ill-appearing.   HENT:      Head: Normocephalic and atraumatic.   Cardiovascular:      Rate and Rhythm: Normal rate and regular rhythm.      Pulses: Normal pulses.      Heart sounds: Normal heart sounds.   Pulmonary:      Effort: Pulmonary effort is normal.      Breath sounds: Normal breath sounds. No wheezing, rhonchi or rales.   Abdominal:      General: Bowel sounds are normal. There is no distension.      Palpations: Abdomen is soft.   Musculoskeletal:         General: No swelling or tenderness. Normal range of motion.      Cervical back: Normal range of motion and neck supple. No tenderness.   Skin:     General: Skin is warm and dry.      Findings: No erythema or rash.      Comments: Slight excoriation to bilateral groin.  Dressing to left foot clean/dry/intact.  Neurological:      Mental Status: He is more alert today, although still drowsy at times.  He does know his name and his daughter's name.     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   Genitourinary:  Burris catheter to bedside drainage with clear, yellow urine.  Psychiatric:         Mood and Affect: Mood normal.         Behavior: Behavior normal.         Thought Content: Thought content normal.         Judgment: Judgment normal    Discharge Disposition:  Skilled Nursing Facility (DC - External)    Discharge Medications:     Discharge Medications      New Medications      Instructions Start Date   acetaminophen 325 MG tablet  Commonly known as: TYLENOL   650 mg, Oral, Every 4 Hours PRN      mupirocin 2 % ointment  Commonly known as: BACTROBAN   Topical, Every 12 Hours Scheduled      nystatin 586278 UNIT/GM powder  Commonly known as: MYCOSTATIN   Topical, Every 12 Hours Scheduled      polyethylene glycol 17 g packet  Commonly known as: MIRALAX   17 g, Oral, Daily      QUEtiapine  25 MG tablet  Commonly known as: SEROquel   25 mg, Oral, Nightly         Changes to Medications      Instructions Start Date   cefdinir 300 MG capsule  Commonly known as: OMNICEF  What changed: when to take this   300 mg, Oral, Every 12 Hours Scheduled      metFORMIN  MG 24 hr tablet  Commonly known as: GLUCOPHAGE-XR  What changed: when to take this   500 mg, Oral, 2 times daily      tamsulosin 0.4 MG capsule 24 hr capsule  Commonly known as: FLOMAX  What changed: Another medication with the same name was removed. Continue taking this medication, and follow the directions you see here.   0.4 mg, Oral, Daily         Continue These Medications      Instructions Start Date   allopurinol 300 MG tablet  Commonly known as: ZYLOPRIM   300 mg, Oral      atorvastatin 20 MG tablet  Commonly known as: LIPITOR   20 mg, Oral, Daily      bisoprolol 10 MG tablet  Commonly known as: ZEBeta   10 mg, Oral, Daily      buPROPion  MG 24 hr tablet  Commonly known as: Wellbutrin XL   300 mg, Oral, Daily      Diclofenac Sodium 1 % gel gel  Commonly known as: VOLTAREN   4 g, Topical, 4 Times Daily PRN      doxepin 10 MG capsule  Commonly known as: SINEquan   10 mg, Oral, Daily      glimepiride 4 MG tablet  Commonly known as: AMARYL   4 mg, Oral, 2 times daily      irbesartan 300 MG tablet  Commonly known as: AVAPRO   150 mg, Oral, Daily      letrozole 2.5 MG tablet  Commonly known as: FEMARA   Oral, Daily      pentoxifylline 400 MG CR tablet  Commonly known as: TRENtal   400 mg, 2 times daily      rivaroxaban 20 MG tablet  Commonly known as: XARELTO   20 mg, Oral, Daily With Dinner, START AFTER COMPLETION OF 15 MG BID X 21 DAYS (SAMPLES OF 15 MG TABS PROVIDED TO PT)         Stop These Medications    amLODIPine 5 MG tablet  Commonly known as: NORVASC     clotrimazole-betamethasone 1-0.05 % cream  Commonly known as: LOTRISONE     cyclobenzaprine 10 MG tablet  Commonly known as: FLEXERIL     doxazosin 8 MG tablet  Commonly known  as: CARDURA     furosemide 40 MG tablet  Commonly known as: LASIX     HYDROcodone-acetaminophen 5-325 MG per tablet  Commonly known as: NORCO          Discharge Diet:   Diet Instructions     Diet: Regular, Consistent Carbohydrate; Thin      Discharge Diet:  Regular  Consistent Carbohydrate       Fluid Consistency: Thin        Activity at Discharge:   Activity Instructions     Activity as Tolerated          Discharge Care Plan/Instructions:   1.  Return for any acute or worsening symptoms.  2.  Keep Burris catheter in place until outpatient follow-up with urology.  3.  Wound care orders for left third toe-clean with normal saline, apply Bactroban, Vaseline gauze, and gauze.  Change every 12 hours.  4.  LSO brace to be fitted prior to discharge.    Follow-up Appointments:   1.  Skilled nursing facility physician as soon as possible for posthospitalization assessment.  2.  Neurosurgery in 3 to 4 weeks  3.  Urology in 1 week    Future Appointments   Date Time Provider Department Center   8/27/2021 10:45 AM Raj Aldridge MD [facility]     Test Results Pending at Discharge: Will follow blood cultures to completion.  No growth to date.    Electronically signed by BRIANNA Carrizales, 4/15/2021, 08:39 CDT.    Time: 45 minutes    I personally evaluated and examined the patient in conjunction with BRIANNA Sales and agree with the assessment, treatment plan, and disposition of the patient as recorded by her. My history, exam, and further recommendations are:     Discussed with his nurses, Aedline and Lacie.    Seen and discussed with his brother and his daughter.  His daughter is a nurse practitioner at our pediatric group. Questions answered.    He seems to have done very well with Seroquel.  He had a little bit of daytime somnolence first thing this morning, but nothing too pronounced.  His daughter wants us to continue this medication moving forward as she feels that it helped him.    Burris catheter  will stay in place until he can be reevaluated by urology.    He will follow up with neurosurgery in 3-4 weeks.  LSO brace to be provided prior to discharge.    Electronically signed by Darin Reyes DO, 4/15/2021, 11:35 CDT.

## 2021-04-15 NOTE — NURSING NOTE
Pt has been pulling on catheter and call light cord, as reported by aide. Checked on pt and attempted to cover catheter. Pt resisted and pushed hands away. Pt received seroquel earlier but is still restless. Turned off lights and decreased environmental stimuli to encourage rest/sleep. Will continue to monitor.

## 2021-04-16 NOTE — THERAPY DISCHARGE NOTE
Acute Care - Occupational Therapy Discharge Summary  [facility]     Patient Name: Dayday Andrews  : 1940  MRN: [id number]    Today's Date: 2021                 Admit Date: 4/10/2021        OT Recommendation and Plan    Visit Dx:    ICD-10-CM ICD-9-CM   1. Generalized weakness  R53.1 780.79   2. Falls frequently  R29.6 V15.88   3. Decreased activities of daily living (ADL)  Z78.9 V49.89   4. Dysphagia, unspecified type  R13.10 787.20   5. Gait abnormality  R26.9 781.2   6. Ulcer of right foot, limited to breakdown of skin (CMS/[facility])  L97.511 707.15               OT Rehab Goals     Row Name 21 0700             Transfer Goal 1 (OT)    Activity/Assistive Device (Transfer Goal 1, OT)  sit-to-stand/stand-to-sit;bed-to-chair/chair-to-bed;toilet  -TS      Henning Level/Cues Needed (Transfer Goal 1, OT)  contact guard assist  -TS      Time Frame (Transfer Goal 1, OT)  long term goal (LTG);by discharge  -TS      Progress/Outcome (Transfer Goal 1, OT)  goal not met  -TS         Dressing Goal 1 (OT)    Activity/Device (Dressing Goal 1, OT)  lower body dressing  -TS      Henning/Cues Needed (Dressing Goal 1, OT)  minimum assist (75% or more patient effort)  -TS      Time Frame (Dressing Goal 1, OT)  long term goal (LTG);by discharge  -TS      Progress/Outcome (Dressing Goal 1, OT)  goal not met  -TS         Toileting Goal 1 (OT)    Activity/Device (Toileting Goal 1, OT)  toileting skills, all;adjust/manage clothing;perform perineal hygiene  -TS      Henning Level/Cues Needed (Toileting Goal 1, OT)  minimum assist (75% or more patient effort)  -TS      Time Frame (Toileting Goal 1, OT)  long term goal (LTG);by discharge  -TS      Progress/Outcome (Toileting Goal 1, OT)  goal not met  -TS        User Key  (r) = Recorded By, (t) = Taken By, (c) = Cosigned By    Initials Name Provider Type Discipline    TS Lindsay Clayton, PATEL/L Occupational Therapy Assistant OT          Outcome Measures      Row Name 04/14/21 1400             How much help from another is currently needed...    Putting on and taking off regular lower body clothing?  2  -TS      Bathing (including washing, rinsing, and drying)  2  -TS      Toileting (which includes using toilet bed pan or urinal)  2  -TS      Putting on and taking off regular upper body clothing  2  -TS      Taking care of personal grooming (such as brushing teeth)  3  -TS      Eating meals  3  -TS      AM-PAC 6 Clicks Score (OT)  14  -TS        User Key  (r) = Recorded By, (t) = Taken By, (c) = Cosigned By    Initials Name Provider Type    Lindsay Beaulieu COTA/L Occupational Therapy Assistant          Timed Therapy Charges  Total Units: 3    Charges  Total Units: 3    Procedure Name Documented Minutes Units Code    HC OT SELF CARE/MGMT/TRAIN EA 15 MIN 38  3    44054 (CPT®)               Documented Minutes  Total Minutes: 38    Therapy Provided Minutes    13495 - OT Self Care/Mgmt Minutes 38                    OT Discharge Summary  Anticipated Discharge Disposition (OT): skilled nursing facility  Reason for Discharge: Discharge from facility  Outcomes Achieved: Refer to plan of care for updates on goals achieved  Discharge Destination: SNF      JANA Acuña  4/16/2021

## 2021-04-20 PROBLEM — R11.2 NAUSEA AND VOMITING: Status: ACTIVE | Noted: 2021-01-01

## 2021-04-20 PROBLEM — S32.020A COMPRESSION FRACTURE OF L2 (HCC): Status: ACTIVE | Noted: 2021-01-01

## 2021-04-23 PROBLEM — E87.0 HYPERNATREMIA: Status: ACTIVE | Noted: 2021-01-01

## 2021-05-03 NOTE — TELEPHONE ENCOUNTER
Steffen pt. Pt's wife answered the phone and said she would have his niece call us back to schedule an appointment because she wasn't sure what she was wanting to do with this. Dr. Radford recommended a 2 wk hospital fu.

## 2022-01-27 NOTE — TELEPHONE ENCOUNTER
Caller: JARRED    Relationship: [facility]    Best call back number: [phone]  What orders are you requesting (i.e. lab or imaging): BACK BRACE    In what timeframe would the patient need to come in: ASAP    Where will you receive your lab/imaging services:     Additional notes: [facility] IS CALLING BECAUSE PT FAMILY STATED THAT WE WERE GOING TO ORDER THE PT A BACK BRACE.  PLEASE ADVISE  THANK YOU        
Called Beverly with [facility] and left message for call back, orders for LSO brace are in patient's chart and signed.  Calling on how to handle the order.  Need a call back letting us know... does it need to be faxed?  Do they want to pick it up?  Do they want is mailed?    Waiting for a call back with instructions.   
We have made a referral for the patient to get an LSO brace.    
negative...

## (undated) DEVICE — PINNACLE INTRODUCER SHEATH: Brand: PINNACLE

## (undated) DEVICE — BIOPATCH™ ANTIMICROBIAL DRESSING WITH CHLORHEXIDINE GLUCONATE IS A HYDROPHILLIC POLYURETHANE ABSORPTIVE FOAM WITH CHLORHEXIDINE GLUCONATE (CHG) WHICH INHIBITS BACTERIAL GROWTH UNDER THE DRESSING. THE DRESSING IS INTENDED TO BE USED TO ABSORB EXUDATE, COVER A WOUND CAUSED BY VASCULAR AND NONVASCULAR PERCUTANEOUS MEDICAL DEVICES DURING SURGERY, AS WELL AS REDUCE LOCAL INFECTION AND COLONIZATION OF MICROORGANISMS.: Brand: BIOPATCH

## (undated) DEVICE — STPCK 3/WY HP M/RA W/OFF/HNDL 1050PSI STRL

## (undated) DEVICE — DRSNG SURESITE123 8X12IN

## (undated) DEVICE — CUFF,BP,DISP,1 TUBE,ADULT,HP: Brand: MEDLINE

## (undated) DEVICE — THE CHANNEL CLEANING BRUSH IS A NYLON FLEXI BRUSH ATTACHED TO A FLEXIBLE PLASTIC SHEATH DESIGNED TO SAFELY REMOVE DEBRIS FROM FLEXIBLE ENDOSCOPES.

## (undated) DEVICE — TBG SMPL FLTR LINE NASL 02/C02 A/ BX/100

## (undated) DEVICE — THE SINGLE USE ETRAP – POLYP TRAP IS USED FOR SUCTION RETRIEVAL OF ENDOSCOPICALLY REMOVED POLYPS.: Brand: ETRAP

## (undated) DEVICE — SNAR POLYP SENSATION MICRO OVL 13 240X40

## (undated) DEVICE — SNAP KOVER: Brand: UNBRANDED

## (undated) DEVICE — GW STARTER FXD CORE J .035 3X260CM 3MM

## (undated) DEVICE — Device: Brand: DEFENDO AIR/WATER/SUCTION AND BIOPSY VALVE

## (undated) DEVICE — PRESSURE TUBING: Brand: TRUWAVE

## (undated) DEVICE — SOL NS 500ML

## (undated) DEVICE — PK CATH CARD 30 CA/4

## (undated) DEVICE — ST INF LWSRB 20DP 2PC MLL 23ML 113IN

## (undated) DEVICE — SENSR O2 OXIMAX FNGR A/ 18IN NONSTR

## (undated) DEVICE — CANN CO2/O2 NASL A/

## (undated) DEVICE — DRSNG SURESITE WNDW 4X4.5

## (undated) DEVICE — IMMOB KN 3PNL DLX CANVS 19IN BLU

## (undated) DEVICE — ENDOGATOR AUXILIARY WATER JET CONNECTOR: Brand: ENDOGATOR

## (undated) DEVICE — KT NDL GUIDE STRL 18GA

## (undated) DEVICE — SOL IRR NACL 0.9PCT BT 1000ML

## (undated) DEVICE — DRSNG SURESITE123 6X8IN

## (undated) DEVICE — CATH EKOSONIC MACH4 DS 5.2F 18X106CM

## (undated) DEVICE — Device

## (undated) DEVICE — SOLIDIFIER LIQUI LOC PLUS 2000CC

## (undated) DEVICE — YANKAUER,BULB TIP WITH VENT: Brand: ARGYLE

## (undated) DEVICE — MASK,OXYGEN,MED CONC,ADLT,7' TUB, UC: Brand: PENDING

## (undated) DEVICE — DEV STBL SHEATH STATLOCK PSI BX/20

## (undated) DEVICE — ELECTRD PAD DEFIB A/

## (undated) DEVICE — CATH F6INF TL JR 4 100CM: Brand: INFINITI